# Patient Record
Sex: FEMALE | Race: WHITE | Employment: UNEMPLOYED | URBAN - METROPOLITAN AREA
[De-identification: names, ages, dates, MRNs, and addresses within clinical notes are randomized per-mention and may not be internally consistent; named-entity substitution may affect disease eponyms.]

---

## 2020-02-12 ENCOUNTER — OFFICE VISIT (OUTPATIENT)
Dept: FAMILY MEDICINE CLINIC | Age: 55
End: 2020-02-12
Payer: COMMERCIAL

## 2020-02-12 VITALS
DIASTOLIC BLOOD PRESSURE: 82 MMHG | SYSTOLIC BLOOD PRESSURE: 126 MMHG | TEMPERATURE: 98 F | BODY MASS INDEX: 29.06 KG/M2 | WEIGHT: 174.4 LBS | HEART RATE: 93 BPM | RESPIRATION RATE: 15 BRPM | OXYGEN SATURATION: 98 % | HEIGHT: 65 IN

## 2020-02-12 PROCEDURE — 99213 OFFICE O/P EST LOW 20 MIN: CPT | Performed by: NURSE PRACTITIONER

## 2020-02-12 RX ORDER — AMOXICILLIN AND CLAVULANATE POTASSIUM 875; 125 MG/1; MG/1
1 TABLET, FILM COATED ORAL 2 TIMES DAILY
Qty: 20 TABLET | Refills: 0 | Status: SHIPPED | OUTPATIENT
Start: 2020-02-12 | End: 2020-02-22

## 2020-02-12 ASSESSMENT — ENCOUNTER SYMPTOMS
EYE PAIN: 0
SORE THROAT: 1
COUGH: 0
COLOR CHANGE: 0
SHORTNESS OF BREATH: 0
RHINORRHEA: 1
EYE DISCHARGE: 0
TROUBLE SWALLOWING: 0
SINUS PRESSURE: 1
SINUS PAIN: 1
WHEEZING: 0

## 2020-02-12 NOTE — PATIENT INSTRUCTIONS
Patient Education        Stopping Smoking: Care Instructions  Your Care Instructions  Cigarette smokers crave the nicotine in cigarettes. Giving it up is much harder than simply changing a habit. Your body has to stop craving the nicotine. It is hard to quit, but you can do it. There are many tools that people use to quit smoking. You may find that combining tools works best for you. There are several steps to quitting. First you get ready to quit. Then you get support to help you. After that, you learn new skills and behaviors to become a nonsmoker. For many people, a necessary step is getting and using medicine. Your doctor will help you set up the plan that best meets your needs. You may want to attend a smoking cessation program to help you quit smoking. When you choose a program, look for one that has proven success. Ask your doctor for ideas. You will greatly increase your chances of success if you take medicine as well as get counseling or join a cessation program.  Some of the changes you feel when you first quit tobacco are uncomfortable. Your body will miss the nicotine at first, and you may feel short-tempered and grumpy. You may have trouble sleeping or concentrating. Medicine can help you deal with these symptoms. You may struggle with changing your smoking habits and rituals. The last step is the tricky one: Be prepared for the smoking urge to continue for a time. This is a lot to deal with, but keep at it. You will feel better. Follow-up care is a key part of your treatment and safety. Be sure to make and go to all appointments, and call your doctor if you are having problems. It's also a good idea to know your test results and keep a list of the medicines you take. How can you care for yourself at home? · Ask your family, friends, and coworkers for support. You have a better chance of quitting if you have help and support.   · Join a support group, such as Nicotine Anonymous, for people who are nicotine. · Be prepared to keep trying. Most people are not successful the first few times they try to quit. Do not get mad at yourself if you smoke again. Make a list of things you learned and think about when you want to try again, such as next week, next month, or next year. Where can you learn more? Go to https://chpesharee.PrestoBox. org and sign in to your [employer] account. Enter A166 in the [employer] box to learn more about \"Stopping Smoking: Care Instructions. \"     If you do not have an account, please click on the \"Sign Up Now\" link. Current as of: July 4, 2019  Content Version: 12.3  © 0427-7250 [employer]wise, [employer]. Care instructions adapted under license by [facility] [address] If you have questions about a medical condition or this instruction, always ask your healthcare professional. [place] [address] any warranty or liability for your use of this information. When should you call for help? Call your doctor now or seek immediate medical care if:    · You have new or worse swelling or redness in your face or around your eyes. · You have a new or higher fever. Watch closely for changes in your health, and be sure to contact your doctor if:    · You have new or worse facial pain. · The mucus from your nose becomes thicker (like pus) or has new blood in it. · You are not getting better as expected. ·   Do supportive care treatment at home for viral URI symptoms which include using cool mist humidifier and saline nasal flushes for nasal congestions. Drink plenty of water and use acetaminophen for fever as directed. How can you care for yourself at home? · Rest as much as possible until you feel better. · Be safe with medicines. Take your medicine exactly as prescribed. Call your doctor if you think you are having a problem with your medicine. You will get more details on the specific medicine your doctor prescribes.   · Take an over-the-counter pain medicine, such as acetaminophen (Tylenol), ibuprofen (Advil, Motrin), or naproxen (Aleve), as needed for pain and fever. Read and follow all instructions on the label. Do not give aspirin to anyone younger than 20. It has been linked to Reye syndrome, a serious illness. · Drink plenty of fluids, enough so that your urine is light yellow or clear like water. Hot fluids, such as tea or soup, may help relieve congestion in your nose and throat. If you have kidney, heart, or liver disease and have to limit fluids, talk with your doctor before you increase the amount of fluids you drink. · Try to clear mucus from your lungs by breathing deeply and coughing. · Gargle with warm salt water once an hour. This can help reduce swelling and throat pain. Use 1 teaspoon of salt mixed in 1 cup of warm water. · Do not smoke or allow others to smoke around you. If you need help quitting, talk to your doctor about stop-smoking programs and medicines. These can increase your chances of quitting for good.

## 2020-02-12 NOTE — PROGRESS NOTES
Lips: Pink. Mouth: Mucous membranes are moist.      Pharynx: No posterior oropharyngeal erythema. Neck:      Musculoskeletal: Neck supple. Cardiovascular:      Rate and Rhythm: Normal rate and regular rhythm. Pulses: Normal pulses. Heart sounds: Normal heart sounds. Pulmonary:      Effort: Pulmonary effort is normal.      Breath sounds: Normal breath sounds. No wheezing, rhonchi or rales. Lymphadenopathy:      Head:      Left side of head: Preauricular adenopathy present. Skin:     General: Skin is warm and dry. Capillary Refill: Capillary refill takes less than 2 seconds. Neurological:      Mental Status: She is alert and oriented to person, place, and time. Assessment:       Diagnosis Orders   1. Acute bacterial sinusitis  amoxicillin-clavulanate (AUGMENTIN) 875-125 MG per tablet      No results found for this visit on 02/12/20. Plan:   Do supportive care treatment at home for viral URI symptoms which include using cool mist humidifier and saline nasal flushes for nasal congestions. Drink plenty of water and use acetaminophen for fever as directed. How can you care for yourself at home? · Rest as much as possible until you feel better. · Be safe with medicines. Take your medicine exactly as prescribed. Call your doctor if you think you are having a problem with your medicine. You will get more details on the specific medicine your doctor prescribes. · Take an over-the-counter pain medicine, such as acetaminophen (Tylenol), ibuprofen (Advil, Motrin), or naproxen (Aleve), as needed for pain and fever. Read and follow all instructions on the label. Do not give aspirin to anyone younger than 20. It has been linked to Reye syndrome, a serious illness. · Drink plenty of fluids, enough so that your urine is light yellow or clear like water. Hot fluids, such as tea or soup, may help relieve congestion in your nose and throat.  If you have kidney, heart, or liver disease and have to limit fluids, talk with your doctor before you increase the amount of fluids you drink. · Try to clear mucus from your lungs by breathing deeply and coughing. · Gargle with warm salt water once an hour. This can help reduce swelling and throat pain. Use 1 teaspoon of salt mixed in 1 cup of warm water. · Do not smoke or allow others to smoke around you. If you need help quitting, talk to your doctor about stop-smoking programs and medicines. These can increase your chances of quitting for good. When should you call for help? Call your doctor now or seek immediate medical care if:    · You have new or worse swelling or redness in your face or around your eyes. · You have a new or higher fever. Watch closely for changes in your health, and be sure to contact your doctor if:    · You have new or worse facial pain. · The mucus from your nose becomes thicker (like pus) or has new blood in it. · You are not getting better as expected. ·   Add a daily zyrtec to allergy regimen. Discussed with patient/family worsening signs and symptoms as outlined above as to when to return for care/seek emergent help. Assessment & Plan   Jay Ivey was seen today for otalgia and headache. Diagnoses and all orders for this visit:    Acute bacterial sinusitis  -     amoxicillin-clavulanate (AUGMENTIN) 875-125 MG per tablet; Take 1 tablet by mouth 2 times daily for 10 days      No orders of the defined types were placed in this encounter. Orders Placed This Encounter   Medications    amoxicillin-clavulanate (AUGMENTIN) 875-125 MG per tablet     Sig: Take 1 tablet by mouth 2 times daily for 10 days     Dispense:  20 tablet     Refill:  0     There are no discontinued medications. Return for Follow up w/ PCP if symptoms worsen or go to ED. Reviewed with the patient/family: current clinical status & medications.   Side effects of the medication prescribed today, as well as treatment

## 2022-08-17 ENCOUNTER — HOSPITAL ENCOUNTER (EMERGENCY)
Age: 57
Discharge: HOME | End: 2022-08-17
Payer: COMMERCIAL

## 2022-08-17 ENCOUNTER — APPOINTMENT (OUTPATIENT)
Dept: CT IMAGING | Age: 57
End: 2022-08-17
Payer: COMMERCIAL

## 2022-08-17 ENCOUNTER — APPOINTMENT (OUTPATIENT)
Dept: GENERAL RADIOLOGY | Age: 57
End: 2022-08-17
Payer: COMMERCIAL

## 2022-08-17 VITALS
BODY MASS INDEX: 24.99 KG/M2 | TEMPERATURE: 98.4 F | HEIGHT: 65 IN | DIASTOLIC BLOOD PRESSURE: 92 MMHG | RESPIRATION RATE: 14 BRPM | WEIGHT: 150 LBS | HEART RATE: 96 BPM | OXYGEN SATURATION: 98 % | SYSTOLIC BLOOD PRESSURE: 124 MMHG

## 2022-08-17 DIAGNOSIS — R93.5 ABNORMAL CT SCAN, PELVIS: ICD-10-CM

## 2022-08-17 DIAGNOSIS — R10.84 GENERALIZED ABDOMINAL PAIN: Primary | ICD-10-CM

## 2022-08-17 LAB
ALBUMIN SERPL-MCNC: 4.4 G/DL (ref 3.5–4.6)
ALP BLD-CCNC: 116 U/L (ref 40–130)
ALT SERPL-CCNC: 10 U/L (ref 0–33)
ANION GAP SERPL CALCULATED.3IONS-SCNC: 10 MEQ/L (ref 9–15)
AST SERPL-CCNC: 10 U/L (ref 0–35)
BASOPHILS ABSOLUTE: 0.1 K/UL (ref 0–0.2)
BASOPHILS RELATIVE PERCENT: 0.7 %
BILIRUB SERPL-MCNC: <0.2 MG/DL (ref 0.2–0.7)
BILIRUBIN URINE: NEGATIVE
BLOOD, URINE: NEGATIVE
BUN BLDV-MCNC: 12 MG/DL (ref 6–20)
CALCIUM SERPL-MCNC: 9.6 MG/DL (ref 8.5–9.9)
CHLORIDE BLD-SCNC: 104 MEQ/L (ref 95–107)
CLARITY: CLEAR
CO2: 23 MEQ/L (ref 20–31)
COLOR: YELLOW
CREAT SERPL-MCNC: 0.67 MG/DL (ref 0.5–0.9)
EKG ATRIAL RATE: 94 BPM
EKG P AXIS: 45 DEGREES
EKG P-R INTERVAL: 152 MS
EKG Q-T INTERVAL: 344 MS
EKG QRS DURATION: 90 MS
EKG QTC CALCULATION (BAZETT): 430 MS
EKG R AXIS: 42 DEGREES
EKG T AXIS: 47 DEGREES
EKG VENTRICULAR RATE: 94 BPM
EOSINOPHILS ABSOLUTE: 0 K/UL (ref 0–0.7)
EOSINOPHILS RELATIVE PERCENT: 0.4 %
GFR AFRICAN AMERICAN: >60
GFR NON-AFRICAN AMERICAN: >60
GLOBULIN: 2.8 G/DL (ref 2.3–3.5)
GLUCOSE BLD-MCNC: 96 MG/DL (ref 70–99)
GLUCOSE URINE: NEGATIVE MG/DL
HCT VFR BLD CALC: 41.8 % (ref 37–47)
HEMOGLOBIN: 13.8 G/DL (ref 12–16)
KETONES, URINE: NEGATIVE MG/DL
LACTIC ACID: 0.9 MMOL/L (ref 0.5–2.2)
LEUKOCYTE ESTERASE, URINE: NEGATIVE
LIPASE: 22 U/L (ref 12–95)
LYMPHOCYTES ABSOLUTE: 1.9 K/UL (ref 1–4.8)
LYMPHOCYTES RELATIVE PERCENT: 18.4 %
MAGNESIUM: 2.1 MG/DL (ref 1.7–2.4)
MCH RBC QN AUTO: 28.1 PG (ref 27–31.3)
MCHC RBC AUTO-ENTMCNC: 33 % (ref 33–37)
MCV RBC AUTO: 85.3 FL (ref 82–100)
MONOCYTES ABSOLUTE: 0.7 K/UL (ref 0.2–0.8)
MONOCYTES RELATIVE PERCENT: 6.8 %
NEUTROPHILS ABSOLUTE: 7.4 K/UL (ref 1.4–6.5)
NEUTROPHILS RELATIVE PERCENT: 73.7 %
NITRITE, URINE: NEGATIVE
PDW BLD-RTO: 15.2 % (ref 11.5–14.5)
PH UA: 6 (ref 5–9)
PLATELET # BLD: 309 K/UL (ref 130–400)
POC CREATININE WHOLE BLOOD: 0.7
POTASSIUM SERPL-SCNC: 4.2 MEQ/L (ref 3.4–4.9)
PROTEIN UA: NEGATIVE MG/DL
RBC # BLD: 4.9 M/UL (ref 4.2–5.4)
SODIUM BLD-SCNC: 137 MEQ/L (ref 135–144)
SPECIFIC GRAVITY UA: 1.01 (ref 1–1.03)
TOTAL PROTEIN: 7.2 G/DL (ref 6.3–8)
TROPONIN: <0.01 NG/ML (ref 0–0.01)
URINE REFLEX TO CULTURE: NORMAL
UROBILINOGEN, URINE: 0.2 E.U./DL
WBC # BLD: 10 K/UL (ref 4.8–10.8)

## 2022-08-17 PROCEDURE — 83690 ASSAY OF LIPASE: CPT

## 2022-08-17 PROCEDURE — 85025 COMPLETE CBC W/AUTO DIFF WBC: CPT

## 2022-08-17 PROCEDURE — 99285 EMERGENCY DEPT VISIT HI MDM: CPT

## 2022-08-17 PROCEDURE — 93005 ELECTROCARDIOGRAM TRACING: CPT | Performed by: EMERGENCY MEDICINE

## 2022-08-17 PROCEDURE — 96361 HYDRATE IV INFUSION ADD-ON: CPT

## 2022-08-17 PROCEDURE — 6360000004 HC RX CONTRAST MEDICATION: Performed by: STUDENT IN AN ORGANIZED HEALTH CARE EDUCATION/TRAINING PROGRAM

## 2022-08-17 PROCEDURE — 80053 COMPREHEN METABOLIC PANEL: CPT

## 2022-08-17 PROCEDURE — 83605 ASSAY OF LACTIC ACID: CPT

## 2022-08-17 PROCEDURE — 81003 URINALYSIS AUTO W/O SCOPE: CPT

## 2022-08-17 PROCEDURE — 2580000003 HC RX 258: Performed by: STUDENT IN AN ORGANIZED HEALTH CARE EDUCATION/TRAINING PROGRAM

## 2022-08-17 PROCEDURE — 36415 COLL VENOUS BLD VENIPUNCTURE: CPT

## 2022-08-17 PROCEDURE — 84484 ASSAY OF TROPONIN QUANT: CPT

## 2022-08-17 PROCEDURE — 96374 THER/PROPH/DIAG INJ IV PUSH: CPT

## 2022-08-17 PROCEDURE — 71045 X-RAY EXAM CHEST 1 VIEW: CPT

## 2022-08-17 PROCEDURE — 83735 ASSAY OF MAGNESIUM: CPT

## 2022-08-17 PROCEDURE — 74177 CT ABD & PELVIS W/CONTRAST: CPT

## 2022-08-17 PROCEDURE — 6360000002 HC RX W HCPCS: Performed by: STUDENT IN AN ORGANIZED HEALTH CARE EDUCATION/TRAINING PROGRAM

## 2022-08-17 PROCEDURE — 96375 TX/PRO/DX INJ NEW DRUG ADDON: CPT

## 2022-08-17 RX ORDER — ONDANSETRON 2 MG/ML
4 INJECTION INTRAMUSCULAR; INTRAVENOUS ONCE
Status: COMPLETED | OUTPATIENT
Start: 2022-08-17 | End: 2022-08-17

## 2022-08-17 RX ORDER — MORPHINE SULFATE 4 MG/ML
4 INJECTION, SOLUTION INTRAMUSCULAR; INTRAVENOUS ONCE
Status: COMPLETED | OUTPATIENT
Start: 2022-08-17 | End: 2022-08-17

## 2022-08-17 RX ORDER — ONDANSETRON 4 MG/1
4 TABLET, ORALLY DISINTEGRATING ORAL 3 TIMES DAILY PRN
Qty: 15 TABLET | Refills: 0 | Status: SHIPPED | OUTPATIENT
Start: 2022-08-17 | End: 2022-08-22

## 2022-08-17 RX ORDER — OXYCODONE HYDROCHLORIDE AND ACETAMINOPHEN 5; 325 MG/1; MG/1
1 TABLET ORAL EVERY 6 HOURS PRN
Qty: 12 TABLET | Refills: 0 | Status: SHIPPED | OUTPATIENT
Start: 2022-08-17 | End: 2022-08-22

## 2022-08-17 RX ORDER — 0.9 % SODIUM CHLORIDE 0.9 %
1000 INTRAVENOUS SOLUTION INTRAVENOUS ONCE
Status: COMPLETED | OUTPATIENT
Start: 2022-08-17 | End: 2022-08-17

## 2022-08-17 RX ADMIN — ONDANSETRON 4 MG: 2 INJECTION INTRAMUSCULAR; INTRAVENOUS at 13:35

## 2022-08-17 RX ADMIN — MORPHINE SULFATE 4 MG: 4 INJECTION, SOLUTION INTRAMUSCULAR; INTRAVENOUS at 13:35

## 2022-08-17 RX ADMIN — HYDROMORPHONE HYDROCHLORIDE 1 MG: 1 INJECTION, SOLUTION INTRAMUSCULAR; INTRAVENOUS; SUBCUTANEOUS at 17:02

## 2022-08-17 RX ADMIN — SODIUM CHLORIDE 1000 ML: 9 INJECTION, SOLUTION INTRAVENOUS at 13:39

## 2022-08-17 RX ADMIN — IOPAMIDOL 50 ML: 612 INJECTION, SOLUTION INTRAVENOUS at 14:38

## 2022-08-17 ASSESSMENT — PAIN - FUNCTIONAL ASSESSMENT
PAIN_FUNCTIONAL_ASSESSMENT: 0-10
PAIN_FUNCTIONAL_ASSESSMENT: 0-10

## 2022-08-17 ASSESSMENT — PAIN SCALES - GENERAL
PAINLEVEL_OUTOF10: 8
PAINLEVEL_OUTOF10: 6
PAINLEVEL_OUTOF10: 2
PAINLEVEL_OUTOF10: 2

## 2022-08-17 ASSESSMENT — ENCOUNTER SYMPTOMS
BACK PAIN: 0
ABDOMINAL PAIN: 1
VOMITING: 0
PHOTOPHOBIA: 0
DIARRHEA: 0
CONSTIPATION: 1
SHORTNESS OF BREATH: 0
COUGH: 0
ABDOMINAL DISTENTION: 1
BLOOD IN STOOL: 0
WHEEZING: 0
ANAL BLEEDING: 0
NAUSEA: 1

## 2022-08-17 ASSESSMENT — PAIN DESCRIPTION - FREQUENCY: FREQUENCY: INTERMITTENT

## 2022-08-17 ASSESSMENT — PAIN DESCRIPTION - LOCATION
LOCATION: ABDOMEN

## 2022-08-17 ASSESSMENT — PAIN DESCRIPTION - PAIN TYPE
TYPE: ACUTE PAIN
TYPE: ACUTE PAIN

## 2022-08-17 ASSESSMENT — PAIN DESCRIPTION - ORIENTATION: ORIENTATION: RIGHT;LEFT;UPPER;LOWER

## 2022-08-17 ASSESSMENT — PAIN DESCRIPTION - DESCRIPTORS
DESCRIPTORS: CRAMPING
DESCRIPTORS: ACHING;SHARP;SPASM

## 2022-08-17 NOTE — ED NOTES
Pt. Resting on cot. Much more comfortable. Reports she is ready to depart. Qs urine voided. Monitors removed. ABC's stable. Comfort and prayers provided to patient & spouse. Pt. Ambulatory out of the ED with nurse into the care of spouse.   Pt.'s updated contact info given to admitting of cell phone number of [facility]  08/17/22 [id number]

## 2022-08-17 NOTE — ED TRIAGE NOTES
Pt to the ED via walk into triage with c/o left sided abdominal pain that is all over that has been on and off for about a month and is now having pain up into left shoulder pain that started yesterday. Pt states that she feels very bloated and full of gas with hypoactive bowel sounds on auscultation.      Pt has hx of diverticulitis with abscess in 2009 and 2017

## 2022-08-17 NOTE — ED NOTES
Pt. Medicated as ordered. Her & her spouse were updated on the current plan of care with questions answered as much as possible at this time. Pt. Intermittently tearful. Comfort, including backrub provided. Spouse & pt educated on the importance of f/u care with the surgeon. They were advised that the office should be calling them early AM; however if they do not, to call in the morning. Spouse provided discharge paper work, medication rx's reviewed with SE.  Recommendation for stool softener initiation was also discussed without question. ABC's stable. Requesting CT report copy if available. Pt. denied further needs- speaking to daughter on phone at this time.      Fernando Wasserman, RODY  08/17/22 [phone]

## 2022-08-17 NOTE — ED NOTES
Pt.care obtained at this time. Care coordinator at bedside. Pt. Tearful in husbands arms  Declined pain medication at this time. Stated, \"I just need some time with my  and to talk to my brother. \"  Pt's wishes honored. Attending PA advised.      Shanda Borrego RN  08/17/22 [phone]

## 2022-08-17 NOTE — DISCHARGE INSTRUCTIONS
Dr. Clara Herr office should be calling you tomorrow. If they do not, please call to make an appointment. Dr. Parveen Gilbert can see you on Friday, he is expecting you. Return to the ED for worsening symptoms.

## 2022-08-18 LAB
GFR AFRICAN AMERICAN: >60
GFR NON-AFRICAN AMERICAN: >60
PERFORMED ON: NORMAL
POC CREATININE: 0.7 MG/DL (ref 0.6–1.2)
POC SAMPLE TYPE: NORMAL

## 2022-08-18 NOTE — PROGRESS NOTES
Patient was in the emergency department with a colon mass. Please have her on the schedule for tomorrow 8/19/2022. She should be calling the office today regarding an appointment.

## 2022-08-19 ENCOUNTER — OFFICE VISIT (OUTPATIENT)
Dept: SURGERY | Age: 57
End: 2022-08-19
Payer: COMMERCIAL

## 2022-08-19 VITALS
HEIGHT: 65 IN | HEART RATE: 79 BPM | OXYGEN SATURATION: 98 % | WEIGHT: 150 LBS | TEMPERATURE: 97.9 F | BODY MASS INDEX: 24.99 KG/M2

## 2022-08-19 DIAGNOSIS — K56.699 DIVERTICULAR STRICTURE (HCC): Primary | ICD-10-CM

## 2022-08-19 PROCEDURE — 99204 OFFICE O/P NEW MOD 45 MIN: CPT | Performed by: COLON & RECTAL SURGERY

## 2022-08-19 ASSESSMENT — ENCOUNTER SYMPTOMS
COLOR CHANGE: 0
RECTAL PAIN: 0
NAUSEA: 1
ABDOMINAL PAIN: 0
CONSTIPATION: 1
SHORTNESS OF BREATH: 0
CHEST TIGHTNESS: 0
ABDOMINAL DISTENTION: 1
ANAL BLEEDING: 0
DIARRHEA: 0
VOMITING: 0
BLOOD IN STOOL: 0

## 2022-08-19 NOTE — PROGRESS NOTES
Subjective:      Patient ID: Irene Vega is a 61 y.o. female who presents for:  Chief Complaint   Patient presents with    Abdominal Pain       This is a 76-year-old female who was seen in the emergency room 48 hours ago regarding continued abdominal distention. She had a CAT scan which showed a strictured area near the rectosigmoid junction with a resultant large bowel obstruction. Interestingly, patient has had colonoscopies with her last one being about 6 years ago. She had no polyps identified. Patient has had a few episodes of diverticulitis which have been treated between this hospital in [facility].    She denies any rectal bleeding. I reviewed the CAT scan from the emergency department. I reviewed previous scans through this system which showed sigmoid diverticulitis. She had been asked about a resection a few years back at [facility] but she responded well to antibiotics. She had a previous umbilical hernia as a young child but no other abdominal surgery. No past medical history on file. No past surgical history on file.   Social History     Socioeconomic History    Marital status:      Spouse name: Not on file    Number of children: Not on file    Years of education: Not on file    Highest education level: Not on file   Occupational History    Not on file   Tobacco Use    Smoking status: Every Day     Types: Cigarettes    Smokeless tobacco: Current   Vaping Use    Vaping Use: Never used   Substance and Sexual Activity    Alcohol use: Not Currently    Drug use: Never    Sexual activity: Not Currently   Other Topics Concern    Not on file   Social History Narrative    Not on file     Social Determinants of Health     Financial Resource Strain: Not on file   Food Insecurity: Not on file   Transportation Needs: Not on file   Physical Activity: Not on file   Stress: Not on file   Social Connections: Not on file   Intimate Partner Violence: Not on file   Housing Stability: Not on file     No family history on file. Allergies:  Codeine    Review of Systems   Constitutional:  Positive for activity change and appetite change. Negative for fatigue and unexpected weight change. HENT:  Negative for congestion. Respiratory:  Negative for chest tightness and shortness of breath. Cardiovascular:  Negative for chest pain and palpitations. Gastrointestinal:  Positive for abdominal distention, constipation and nausea. Negative for abdominal pain, anal bleeding, blood in stool, diarrhea, rectal pain and vomiting. Genitourinary:  Negative for difficulty urinating and frequency. Musculoskeletal:  Negative for arthralgias. Skin:  Negative for color change. Neurological:  Negative for dizziness and headaches. Hematological:  Does not bruise/bleed easily. Psychiatric/Behavioral:  Negative for agitation. Objective:    Pulse 79   Temp 97.9 °F (36.6 °C) (Temporal)   Ht 5' 5\" (1.651 m)   Wt 150 lb (68 kg)   LMP  (LMP Unknown)   SpO2 98%   BMI 24.96 kg/m²     Physical Exam  Constitutional:       Appearance: She is well-developed. HENT:      Head: Normocephalic and atraumatic. Eyes:      Pupils: Pupils are equal, round, and reactive to light. Cardiovascular:      Rate and Rhythm: Normal rate and regular rhythm. Heart sounds: Normal heart sounds. Pulmonary:      Effort: Pulmonary effort is normal. No respiratory distress. Breath sounds: Normal breath sounds. No wheezing. Abdominal:      General: There is distension. Palpations: There is no mass. Tenderness: There is no abdominal tenderness. There is no guarding or rebound. Hernia: No hernia is present. Genitourinary:     Comments: Digital exam performed and unremarkable. Musculoskeletal:         General: Normal range of motion. Cervical back: Normal range of motion and neck supple. Skin:     General: Skin is warm and dry. Coloration: Skin is not pale.       Findings: No erythema or rash.   Neurological:      Mental Status: She is alert and oriented to person, place, and time. Psychiatric:         Behavior: Behavior normal.         Judgment: Judgment normal.            Assessment/Plan:          Diagnosis Orders   1. Diverticular stricture ([address]          I discussed these findings  With her clinical history. Given her previous work-up and treatment for diverticulitis of the sigmoid colon, along with the previous colonoscopies which were unremarkable, I believe this is related to a sigmoid stricture. It does appear high-grade. I discussed the risks and benefits of sigmoid resection with diverting ileostomy. I discussed all the potential complications of anastomotic leak as well as intraoperative problems such as damage to the surrounding structures and reoperation. I explained that this could potentially represent a cancer although it would be treated identically. Given her history, I do not believe this represents a malignancy it is possible however. I have discussed the potential for a colonoscopy although I believe this high-grade stenosis will not allow treatment and potentially aggravate the issue by increasing the size and girth of the colon to the point where this becomes more of an urgent operation. I discussed the benefits of a slow bowel prep to allow resection with primary anastomosis and diverting ileostomy. All questions were answered. All possibilities including malignancy discussed. She understands and wishes to proceed. She agrees to the prospect of a colonoscopy and the potential aggravation of an existing large bowel obstruction will be an unnecessary risk. She understands the need for up postoperative colonoscopy in 3 to 6-month following surgery. I explained to her all the options. She understands the risks as well as the need for a slow bowel prep.  was present during our discussion. She is well-informed. She wishes to proceed.     Ostomy nurse to ozzie her preoperatively. Total time spent with the patient including reviewing films and previous medical notes was 60 minutes. Please note this report has beenpartially produced using speech recognition software and may cause contain errors related to that system including grammar, punctuation and spelling as well as words and phrases that may seem inappropriate.  If there arequestions or concerns please feel free to contact me to clarify

## 2022-08-24 ENCOUNTER — TELEPHONE (OUTPATIENT)
Dept: SURGERY | Age: 57
End: 2022-08-24

## 2022-08-24 ENCOUNTER — HOSPITAL ENCOUNTER (INPATIENT)
Age: 57
LOS: 7 days | Discharge: HOME HEALTH | DRG: 329 | End: 2022-08-31
Attending: COLON & RECTAL SURGERY | Admitting: COLON & RECTAL SURGERY
Payer: COMMERCIAL

## 2022-08-24 DIAGNOSIS — G89.18 POSTOPERATIVE PAIN: Primary | ICD-10-CM

## 2022-08-24 DIAGNOSIS — K57.90 DIVERTICULAR DISEASE: ICD-10-CM

## 2022-08-24 PROBLEM — K56.609 INTESTINAL OBSTRUCTION (HCC): Status: ACTIVE | Noted: 2022-08-24

## 2022-08-24 LAB
ALBUMIN SERPL-MCNC: 4.3 G/DL (ref 3.5–4.6)
ALP BLD-CCNC: 114 U/L (ref 40–130)
ALT SERPL-CCNC: 9 U/L (ref 0–33)
ANION GAP SERPL CALCULATED.3IONS-SCNC: 12 MEQ/L (ref 9–15)
AST SERPL-CCNC: 8 U/L (ref 0–35)
BASOPHILS ABSOLUTE: 0 K/UL (ref 0–0.2)
BASOPHILS RELATIVE PERCENT: 0.5 %
BILIRUB SERPL-MCNC: 0.4 MG/DL (ref 0.2–0.7)
BUN BLDV-MCNC: 11 MG/DL (ref 6–20)
CALCIUM SERPL-MCNC: 9.5 MG/DL (ref 8.5–9.9)
CHLORIDE BLD-SCNC: 99 MEQ/L (ref 95–107)
CO2: 25 MEQ/L (ref 20–31)
CREAT SERPL-MCNC: 0.69 MG/DL (ref 0.5–0.9)
EOSINOPHILS ABSOLUTE: 0 K/UL (ref 0–0.7)
EOSINOPHILS RELATIVE PERCENT: 0.5 %
GFR AFRICAN AMERICAN: >60
GFR NON-AFRICAN AMERICAN: >60
GLOBULIN: 2.9 G/DL (ref 2.3–3.5)
GLUCOSE BLD-MCNC: 92 MG/DL (ref 70–99)
HCT VFR BLD CALC: 41.4 % (ref 37–47)
HEMOGLOBIN: 14 G/DL (ref 12–16)
LYMPHOCYTES ABSOLUTE: 2 K/UL (ref 1–4.8)
LYMPHOCYTES RELATIVE PERCENT: 26.1 %
MCH RBC QN AUTO: 28.8 PG (ref 27–31.3)
MCHC RBC AUTO-ENTMCNC: 33.8 % (ref 33–37)
MCV RBC AUTO: 85.4 FL (ref 82–100)
MONOCYTES ABSOLUTE: 0.8 K/UL (ref 0.2–0.8)
MONOCYTES RELATIVE PERCENT: 9.9 %
NEUTROPHILS ABSOLUTE: 4.9 K/UL (ref 1.4–6.5)
NEUTROPHILS RELATIVE PERCENT: 63 %
PDW BLD-RTO: 15.2 % (ref 11.5–14.5)
PLATELET # BLD: 312 K/UL (ref 130–400)
POTASSIUM REFLEX MAGNESIUM: 4.2 MEQ/L (ref 3.4–4.9)
RBC # BLD: 4.85 M/UL (ref 4.2–5.4)
SODIUM BLD-SCNC: 136 MEQ/L (ref 135–144)
TOTAL PROTEIN: 7.2 G/DL (ref 6.3–8)
WBC # BLD: 7.8 K/UL (ref 4.8–10.8)

## 2022-08-24 PROCEDURE — 99221 1ST HOSP IP/OBS SF/LOW 40: CPT | Performed by: COLON & RECTAL SURGERY

## 2022-08-24 PROCEDURE — 80053 COMPREHEN METABOLIC PANEL: CPT

## 2022-08-24 PROCEDURE — 6360000002 HC RX W HCPCS: Performed by: COLON & RECTAL SURGERY

## 2022-08-24 PROCEDURE — 1210000000 HC MED SURG R&B

## 2022-08-24 PROCEDURE — 2580000003 HC RX 258: Performed by: COLON & RECTAL SURGERY

## 2022-08-24 PROCEDURE — 36415 COLL VENOUS BLD VENIPUNCTURE: CPT

## 2022-08-24 PROCEDURE — 6360000002 HC RX W HCPCS: Performed by: INTERNAL MEDICINE

## 2022-08-24 PROCEDURE — 85025 COMPLETE CBC W/AUTO DIFF WBC: CPT

## 2022-08-24 RX ORDER — METOCLOPRAMIDE HYDROCHLORIDE 5 MG/ML
10 INJECTION INTRAMUSCULAR; INTRAVENOUS ONCE
Status: COMPLETED | OUTPATIENT
Start: 2022-08-24 | End: 2022-08-24

## 2022-08-24 RX ORDER — ONDANSETRON 4 MG/1
4 TABLET, ORALLY DISINTEGRATING ORAL EVERY 8 HOURS PRN
Status: DISCONTINUED | OUTPATIENT
Start: 2022-08-24 | End: 2022-08-29

## 2022-08-24 RX ORDER — ONDANSETRON 2 MG/ML
4 INJECTION INTRAMUSCULAR; INTRAVENOUS EVERY 6 HOURS PRN
Status: DISCONTINUED | OUTPATIENT
Start: 2022-08-24 | End: 2022-08-29

## 2022-08-24 RX ORDER — ENOXAPARIN SODIUM 100 MG/ML
40 INJECTION SUBCUTANEOUS DAILY
Status: DISCONTINUED | OUTPATIENT
Start: 2022-08-24 | End: 2022-08-25

## 2022-08-24 RX ORDER — SODIUM CHLORIDE 0.9 % (FLUSH) 0.9 %
5-40 SYRINGE (ML) INJECTION EVERY 12 HOURS SCHEDULED
Status: DISCONTINUED | OUTPATIENT
Start: 2022-08-24 | End: 2022-08-31 | Stop reason: HOSPADM

## 2022-08-24 RX ORDER — NICOTINE 21 MG/24HR
1 PATCH, TRANSDERMAL 24 HOURS TRANSDERMAL DAILY PRN
Status: DISCONTINUED | OUTPATIENT
Start: 2022-08-24 | End: 2022-08-31 | Stop reason: HOSPADM

## 2022-08-24 RX ORDER — SODIUM CHLORIDE 9 MG/ML
INJECTION, SOLUTION INTRAVENOUS CONTINUOUS
Status: DISCONTINUED | OUTPATIENT
Start: 2022-08-24 | End: 2022-08-25

## 2022-08-24 RX ORDER — M-VIT,TX,IRON,MINS/CALC/FOLIC 27MG-0.4MG
1 TABLET ORAL DAILY
COMMUNITY

## 2022-08-24 RX ORDER — SODIUM CHLORIDE 0.9 % (FLUSH) 0.9 %
5-40 SYRINGE (ML) INJECTION PRN
Status: DISCONTINUED | OUTPATIENT
Start: 2022-08-24 | End: 2022-08-31 | Stop reason: HOSPADM

## 2022-08-24 RX ORDER — KETOROLAC TROMETHAMINE 30 MG/ML
30 INJECTION, SOLUTION INTRAMUSCULAR; INTRAVENOUS EVERY 6 HOURS PRN
Status: DISPENSED | OUTPATIENT
Start: 2022-08-24 | End: 2022-08-27

## 2022-08-24 RX ORDER — SODIUM CHLORIDE 9 MG/ML
INJECTION, SOLUTION INTRAVENOUS PRN
Status: DISCONTINUED | OUTPATIENT
Start: 2022-08-24 | End: 2022-08-31 | Stop reason: HOSPADM

## 2022-08-24 RX ADMIN — ONDANSETRON 4 MG: 2 INJECTION INTRAMUSCULAR; INTRAVENOUS at 21:46

## 2022-08-24 RX ADMIN — ONDANSETRON 4 MG: 2 INJECTION INTRAMUSCULAR; INTRAVENOUS at 14:42

## 2022-08-24 RX ADMIN — SODIUM CHLORIDE: 9 INJECTION, SOLUTION INTRAVENOUS at 21:38

## 2022-08-24 RX ADMIN — SODIUM CHLORIDE: 9 INJECTION, SOLUTION INTRAVENOUS at 14:42

## 2022-08-24 RX ADMIN — HYDROMORPHONE HYDROCHLORIDE 1 MG: 1 INJECTION, SOLUTION INTRAMUSCULAR; INTRAVENOUS; SUBCUTANEOUS at 14:42

## 2022-08-24 RX ADMIN — ENOXAPARIN SODIUM 40 MG: 100 INJECTION SUBCUTANEOUS at 14:44

## 2022-08-24 RX ADMIN — METOCLOPRAMIDE 10 MG: 5 INJECTION, SOLUTION INTRAMUSCULAR; INTRAVENOUS at 16:30

## 2022-08-24 RX ADMIN — KETOROLAC TROMETHAMINE 30 MG: 30 INJECTION, SOLUTION INTRAMUSCULAR; INTRAVENOUS at 21:45

## 2022-08-24 ASSESSMENT — PAIN SCALES - GENERAL
PAINLEVEL_OUTOF10: 7
PAINLEVEL_OUTOF10: 10
PAINLEVEL_OUTOF10: 2
PAINLEVEL_OUTOF10: 10

## 2022-08-24 ASSESSMENT — PAIN - FUNCTIONAL ASSESSMENT: PAIN_FUNCTIONAL_ASSESSMENT: ACTIVITIES ARE NOT PREVENTED

## 2022-08-24 ASSESSMENT — PAIN DESCRIPTION - LOCATION: LOCATION: ABDOMEN

## 2022-08-24 NOTE — PLAN OF CARE
Problem: Discharge Planning  Goal: Discharge to home or other facility with appropriate resources  Outcome: Progressing  Flowsheets (Taken 8/24/2022 1512)  Discharge to home or other facility with appropriate resources: Identify discharge learning needs (meds, wound care, etc)     Problem: ABCDS Injury Assessment  Goal: Absence of physical injury  Outcome: Progressing     Problem: Pain  Goal: Verbalizes/displays adequate comfort level or baseline comfort level  Outcome: Progressing

## 2022-08-24 NOTE — H&P
Department of General Surgery - Adult  Surgical Service colorectal surgery  Attending admit note      CHIEF COMPLAINT: Abdominal pain    History Obtained From:  patient, electronic medical record    HISTORY OF PRESENT ILLNESS:    This is a 79-year-old female who was seen in the emergency room 48 hours ago regarding continued abdominal distention. She had a CAT scan which showed a strictured area near the rectosigmoid junction with a resultant large bowel obstruction. Interestingly, patient has had colonoscopies with her last one being about 6 years ago. She had no polyps identified. Patient has had a few episodes of diverticulitis which have been treated between this hospital in [facility].     She denies any rectal bleeding. I reviewed the CAT scan from the emergency department. I reviewed previous scans through this system which showed sigmoid diverticulitis. She had been asked about a resection a few years back at [facility] but she responded well to antibiotics. She had a previous umbilical hernia as a young child but no other abdominal surgery. She was scheduled to have surgery tomorrow but has had persistent issues with abdominal distention and pain. She was directly admitted for pain control and surgical treatment prior to surgery. All questions were answered regarding her upcoming surgery tomorrow. She wishes to proceed. Consent obtained. Past Medical History:    History reviewed. No pertinent past medical history. Past Surgical History:    History reviewed. No pertinent surgical history.   Current Medications:   Current Facility-Administered Medications: sodium chloride flush 0.9 % injection 5-40 mL, 5-40 mL, IntraVENous, 2 times per day  sodium chloride flush 0.9 % injection 5-40 mL, 5-40 mL, IntraVENous, PRN  0.9 % sodium chloride infusion, , IntraVENous, PRN  enoxaparin (LOVENOX) injection 40 mg, 40 mg, SubCUTAneous, Daily  ondansetron (ZOFRAN-ODT) lymphadenopathy  BACK:  Symmetric, no curvature, spinous processes are non-tender on palpation, paraspinous muscles are non-tender on palpation, no costal vertebral tenderness  LUNGS:  No increased work of breathing, good air exchange, clear to auscultation bilaterally, no crackles or wheezing  CARDIOVASCULAR:  Normal apical impulse, regular rate and rhythm, normal S1 and S2, no S3 or S4, and no murmur noted  ABDOMEN: Soft mildly distended no abdominal wall hernia  MUSCULOSKELETAL:  There is no redness, warmth, or swelling of the joints. Full range of motion noted. Motor strength is 5 out of 5 all extremities bilaterally. Tone is normal.  SKIN:  no bruising or bleeding  DATA:    CBC:   Lab Results   Component Value Date/Time    WBC 7.8 08/24/2022 02:51 PM    RBC 4.85 08/24/2022 02:51 PM    HGB 14.0 08/24/2022 02:51 PM    HCT 41.4 08/24/2022 02:51 PM    MCV 85.4 08/24/2022 02:51 PM    MCH 28.8 08/24/2022 02:51 PM    MCHC 33.8 08/24/2022 02:51 PM    RDW 15.2 08/24/2022 02:51 PM     08/24/2022 02:51 PM     CMP:    Lab Results   Component Value Date/Time     08/24/2022 02:51 PM    K 4.2 08/24/2022 02:51 PM    CL 99 08/24/2022 02:51 PM    CO2 25 08/24/2022 02:51 PM    BUN 11 08/24/2022 02:51 PM    CREATININE 0.69 08/24/2022 02:51 PM    GFRAA >60.0 08/24/2022 02:51 PM    LABGLOM >60.0 08/24/2022 02:51 PM    GLUCOSE 92 08/24/2022 02:51 PM    PROT 7.2 08/24/2022 02:51 PM    LABALBU 4.3 08/24/2022 02:51 PM    CALCIUM 9.5 08/24/2022 02:51 PM    BILITOT 0.4 08/24/2022 02:51 PM    ALKPHOS 114 08/24/2022 02:51 PM    AST 8 08/24/2022 02:51 PM    ALT 9 08/24/2022 02:51 PM       IMPRESSION/RECOMMENDATIONS:      Large bowel obstruction from suspected diverticular stricture. Carcinoma possibility as well. Patient to be started on IV fluids overnight. Stoma marking tomorrow    Plan for laparotomy and bowel resection with diverting ostomy tomorrow unchanged from previous plan. All questions were answered. Nasogastric tube attempted but unsuccessful. Abdomen currently soft and only mildly distended. We will continue current management    Hospitalist consulted for assistance with medical comorbidities.

## 2022-08-24 NOTE — TELEPHONE ENCOUNTER
Patient calling. She was seen Friday for abd pain and abnl CT. Scheduled for sigmoid colectomy tomorrow. States she has been up since 3 am this morning trying to complete the bowel prep. States she is unable to have a bowel movement. She is getting very nauseous from drinking the prep. Her entire abdomen is painful. She is requesting a phone call from Dr. Rona Williamson. I did notify the patient that doctor is in the OR today but I would send him the message. Pt cell # [phone].    Pt  cell # [phone]

## 2022-08-24 NOTE — CONSULTS
Physician Progress Note    8/24/2022   3:02 PM    Name:  Cyndie Perez  MRN:    [id number]      Day: 0     Admit Date: 8/24/2022  1:48 PM  PCP: Ermias Fox MD    Code Status:  Full Code    Subjective:     She has been feeling unwell for 2 months but over the last 4 weeks she has been having abdominal discomfort. She is found to have a mass in her abdomen during an ED visit on 8/17 and was brought in for surgery today. Over the last 2 days, she has been having nausea and vomiting. She did have a bowel movement this morning. She has never had a colonoscopy before. She has family history of lung cancer in her father and brother. She smokes 1.5 PPD since she was a teenager. She denies any history of known lung issues, heart issues, prior CVA. She does not take any medications for high blood pressure or diabetes. She denies alcohol or illicit drug use.     Current Facility-Administered Medications   Medication Dose Route Frequency Provider Last Rate Last Admin    sodium chloride flush 0.9 % injection 5-40 mL  5-40 mL IntraVENous 2 times per day Dorcas Obregon MD        sodium chloride flush 0.9 % injection 5-40 mL  5-40 mL IntraVENous PRN Dorcas Obregon MD        0.9 % sodium chloride infusion   IntraVENous PRN Dorcas Obregon MD        enoxaparin (LOVENOX) injection 40 mg  40 mg SubCUTAneous Daily Dorcas Obregon MD   40 mg at 08/24/22 1444    ondansetron (ZOFRAN-ODT) disintegrating tablet 4 mg  4 mg Oral Q8H PRN Dorcas Obregon MD        Or    ondansetron [facility]) injection 4 mg  4 mg IntraVENous Q6H PRN Dorcas Obregon MD   4 mg at 08/24/22 1442    0.9 % sodium chloride infusion   IntraVENous Continuous Dorcas Obregon  mL/hr at 08/24/22 1442 New Bag at 08/24/22 1442    HYDROmorphone (DILAUDID) injection 0.5 mg  0.5 mg IntraVENous Q3H PRN Dorcas Obregon MD        Or    HYDROmorphone (DILAUDID) injection 1 mg  1 mg IntraVENous Q3H PRCONNIE Obregon MD 1 mg at 22 1442    nicotine (NICODERM CQ) 21 MG/24HR 1 patch  1 patch TransDERmal Daily PRN Mekhi Sheriff DO           Physical Examination:      Vitals:  /76   Pulse 78   Temp 98.2 °F (36.8 °C) (Oral)   Resp 18   Ht 5' 5\" (1.651 m)   Wt 150 lb (68 kg)   LMP  (LMP Unknown)   SpO2 100%   BMI 24.96 kg/m²   Temp (24hrs), Av.2 °F (36.8 °C), Min:98.2 °F (36.8 °C), Max:98.2 °F (36.8 °C)      General appearance: alert, cooperative and no distress  Mental Status: oriented to person, place and time and normal affect  Lungs: clear to auscultation bilaterally, normal effort  Heart: regular rate and rhythm, no murmur  Abdomen: Diffuse mild tenderness to palpation. Abdomen is otherwise soft. No guarding or rebound tenderness  Extremities: no edema, redness, tenderness in the calves. Cap refill <2s  Skin: no gross lesions, rashes    Data:     Labs:  No results for input(s): WBC, HGB, PLT in the last 72 hours. No results for input(s): NA, K, CL, CO2, BUN, CREATININE, GLUCOSE in the last 72 hours. No results for input(s): AST, ALT, ALB, BILITOT, ALKPHOS in the last 72 hours. Assessment and Plan:        1. Obstructing colonic neoplasm in the distal sigmoid colon:  -Plan for surgery . We will follow surgical pathology.   Perioperative pain control, DVT prophylaxis, antibiotics, diet, activity, wound care per surgeon  -Basic blood work ordered    Tobacco use disorder  History of diverticulitis with abscess  Hiatal hernia     Diet: Diet NPO  Full Code    40 minutes in total care time    Electronically signed by Mekhi Sheriff DO on 2022 at 3:02 PM

## 2022-08-24 NOTE — PROGRESS NOTES
Spiritual Care Services     Summary of Visit:  Chasidy Montiel responded to spiritual care consult, pt is a a little anxious about her procedure tomorrow, pt shared that she wants to get better so she can continue to care for her grandson, pt has had her grandson since he was one day old, pt is hopeful, and open to prayer, prayed for the pt before leaving the room,     Spiritual Assessment/Intervention/Outcomes:                                  Values / Beliefs  Do You Have Any Ethnic, Cultural, Sacramental, or Spiritual Jewish Needs You Would Like Us To Be Aware of While You Are in the Hospital : No    Care Plan:    Ongoing support and prayers    Spiritual Care Services   Electronically signed by Johanna Donnelly on 8/24/22 at 7:33 PM EDT     To reach a  for emotional and spiritual support, place an [facility] consult request.   If a  is needed immediately, dial 0 and ask to page the on-call .

## 2022-08-25 ENCOUNTER — ANESTHESIA (OUTPATIENT)
Dept: OPERATING ROOM | Age: 57
DRG: 329 | End: 2022-08-25
Payer: COMMERCIAL

## 2022-08-25 ENCOUNTER — ANESTHESIA EVENT (OUTPATIENT)
Dept: OPERATING ROOM | Age: 57
DRG: 329 | End: 2022-08-25
Payer: COMMERCIAL

## 2022-08-25 PROBLEM — K57.90 DIVERTICULAR DISEASE: Status: ACTIVE | Noted: 2022-08-25

## 2022-08-25 PROBLEM — S37.10XA: Status: ACTIVE | Noted: 2022-08-25

## 2022-08-25 LAB
ABO/RH: NORMAL
ANION GAP SERPL CALCULATED.3IONS-SCNC: 10 MEQ/L (ref 9–15)
ANTIBODY SCREEN: NORMAL
BLOOD BANK DISPENSE STATUS: NORMAL
BLOOD BANK PRODUCT CODE: NORMAL
BPU ID: NORMAL
BUN BLDV-MCNC: 13 MG/DL (ref 6–20)
CALCIUM SERPL-MCNC: 9 MG/DL (ref 8.5–9.9)
CHLORIDE BLD-SCNC: 104 MEQ/L (ref 95–107)
CO2: 24 MEQ/L (ref 20–31)
CREAT SERPL-MCNC: 0.72 MG/DL (ref 0.5–0.9)
DESCRIPTION BLOOD BANK: NORMAL
GFR AFRICAN AMERICAN: >60
GFR NON-AFRICAN AMERICAN: >60
GLUCOSE BLD-MCNC: 98 MG/DL (ref 70–99)
HCT VFR BLD CALC: 40.4 % (ref 37–47)
HEMOGLOBIN: 13.6 G/DL (ref 12–16)
MCH RBC QN AUTO: 28.7 PG (ref 27–31.3)
MCHC RBC AUTO-ENTMCNC: 33.7 % (ref 33–37)
MCV RBC AUTO: 85.1 FL (ref 82–100)
PDW BLD-RTO: 14.9 % (ref 11.5–14.5)
PHOSPHORUS: 3.4 MG/DL (ref 2.3–4.8)
PLATELET # BLD: 302 K/UL (ref 130–400)
POTASSIUM REFLEX MAGNESIUM: 4.1 MEQ/L (ref 3.4–4.9)
RBC # BLD: 4.74 M/UL (ref 4.2–5.4)
SODIUM BLD-SCNC: 138 MEQ/L (ref 135–144)
WBC # BLD: 7.6 K/UL (ref 4.8–10.8)

## 2022-08-25 PROCEDURE — P9059 PLASMA, FRZ BETWEEN 8-24HOUR: HCPCS

## 2022-08-25 PROCEDURE — 2500000003 HC RX 250 WO HCPCS: Performed by: NURSE ANESTHETIST, CERTIFIED REGISTERED

## 2022-08-25 PROCEDURE — 6360000002 HC RX W HCPCS: Performed by: ANESTHESIOLOGY

## 2022-08-25 PROCEDURE — 3600000004 HC SURGERY LEVEL 4 BASE: Performed by: COLON & RECTAL SURGERY

## 2022-08-25 PROCEDURE — 2580000003 HC RX 258: Performed by: COLON & RECTAL SURGERY

## 2022-08-25 PROCEDURE — 0UT20ZZ RESECTION OF BILATERAL OVARIES, OPEN APPROACH: ICD-10-PCS | Performed by: COLON & RECTAL SURGERY

## 2022-08-25 PROCEDURE — 6360000002 HC RX W HCPCS: Performed by: NURSE PRACTITIONER

## 2022-08-25 PROCEDURE — 6370000000 HC RX 637 (ALT 250 FOR IP)

## 2022-08-25 PROCEDURE — 6360000002 HC RX W HCPCS: Performed by: COLON & RECTAL SURGERY

## 2022-08-25 PROCEDURE — 2580000003 HC RX 258: Performed by: NURSE ANESTHETIST, CERTIFIED REGISTERED

## 2022-08-25 PROCEDURE — C2625 STENT, NON-COR, TEM W/DEL SY: HCPCS | Performed by: COLON & RECTAL SURGERY

## 2022-08-25 PROCEDURE — C9113 INJ PANTOPRAZOLE SODIUM, VIA: HCPCS | Performed by: NURSE PRACTITIONER

## 2022-08-25 PROCEDURE — 0DTN0ZZ RESECTION OF SIGMOID COLON, OPEN APPROACH: ICD-10-PCS | Performed by: COLON & RECTAL SURGERY

## 2022-08-25 PROCEDURE — 52332 CYSTOSCOPY AND TREATMENT: CPT | Performed by: UROLOGY

## 2022-08-25 PROCEDURE — 2500000003 HC RX 250 WO HCPCS: Performed by: ANESTHESIOLOGY

## 2022-08-25 PROCEDURE — 2580000003 HC RX 258: Performed by: NURSE PRACTITIONER

## 2022-08-25 PROCEDURE — 7100000000 HC PACU RECOVERY - FIRST 15 MIN

## 2022-08-25 PROCEDURE — 86923 COMPATIBILITY TEST ELECTRIC: CPT

## 2022-08-25 PROCEDURE — 99213 OFFICE O/P EST LOW 20 MIN: CPT

## 2022-08-25 PROCEDURE — 3600000014 HC SURGERY LEVEL 4 ADDTL 15MIN: Performed by: COLON & RECTAL SURGERY

## 2022-08-25 PROCEDURE — 3700000000 HC ANESTHESIA ATTENDED CARE: Performed by: COLON & RECTAL SURGERY

## 2022-08-25 PROCEDURE — 3700000001 HC ADD 15 MINUTES (ANESTHESIA): Performed by: COLON & RECTAL SURGERY

## 2022-08-25 PROCEDURE — 0UT70ZZ RESECTION OF BILATERAL FALLOPIAN TUBES, OPEN APPROACH: ICD-10-PCS | Performed by: COLON & RECTAL SURGERY

## 2022-08-25 PROCEDURE — A4216 STERILE WATER/SALINE, 10 ML: HCPCS | Performed by: NURSE PRACTITIONER

## 2022-08-25 PROCEDURE — P9016 RBC LEUKOCYTES REDUCED: HCPCS

## 2022-08-25 PROCEDURE — 62325 NJX INTERLAMINAR CRV/THRC: CPT | Performed by: ANESTHESIOLOGY

## 2022-08-25 PROCEDURE — 0UT90ZZ RESECTION OF UTERUS, OPEN APPROACH: ICD-10-PCS | Performed by: COLON & RECTAL SURGERY

## 2022-08-25 PROCEDURE — 86901 BLOOD TYPING SEROLOGIC RH(D): CPT

## 2022-08-25 PROCEDURE — 2000000000 HC ICU R&B

## 2022-08-25 PROCEDURE — 80048 BASIC METABOLIC PNL TOTAL CA: CPT

## 2022-08-25 PROCEDURE — 44120 REMOVAL OF SMALL INTESTINE: CPT | Performed by: COLON & RECTAL SURGERY

## 2022-08-25 PROCEDURE — 88307 TISSUE EXAM BY PATHOLOGIST: CPT

## 2022-08-25 PROCEDURE — 84100 ASSAY OF PHOSPHORUS: CPT

## 2022-08-25 PROCEDURE — 36415 COLL VENOUS BLD VENIPUNCTURE: CPT

## 2022-08-25 PROCEDURE — 7100000001 HC PACU RECOVERY - ADDTL 15 MIN

## 2022-08-25 PROCEDURE — 2709999900 HC NON-CHARGEABLE SUPPLY: Performed by: COLON & RECTAL SURGERY

## 2022-08-25 PROCEDURE — 6360000002 HC RX W HCPCS: Performed by: NURSE ANESTHETIST, CERTIFIED REGISTERED

## 2022-08-25 PROCEDURE — 86850 RBC ANTIBODY SCREEN: CPT

## 2022-08-25 PROCEDURE — 0D1N0Z4 BYPASS SIGMOID COLON TO CUTANEOUS, OPEN APPROACH: ICD-10-PCS | Performed by: COLON & RECTAL SURGERY

## 2022-08-25 PROCEDURE — 58150 TOTAL HYSTERECTOMY: CPT | Performed by: OBSTETRICS & GYNECOLOGY

## 2022-08-25 PROCEDURE — 86900 BLOOD TYPING SEROLOGIC ABO: CPT

## 2022-08-25 PROCEDURE — 2720000010 HC SURG SUPPLY STERILE: Performed by: COLON & RECTAL SURGERY

## 2022-08-25 PROCEDURE — 0T778DZ DILATION OF LEFT URETER WITH INTRALUMINAL DEVICE, VIA NATURAL OR ARTIFICIAL OPENING ENDOSCOPIC: ICD-10-PCS | Performed by: COLON & RECTAL SURGERY

## 2022-08-25 PROCEDURE — 44141 PARTIAL REMOVAL OF COLON: CPT | Performed by: COLON & RECTAL SURGERY

## 2022-08-25 PROCEDURE — 2580000003 HC RX 258: Performed by: ANESTHESIOLOGY

## 2022-08-25 PROCEDURE — 85027 COMPLETE CBC AUTOMATED: CPT

## 2022-08-25 DEVICE — STENT URET 6FR L26CM POLYMER BLEND PH FREE COAT GRADUAL TAPR: Type: IMPLANTABLE DEVICE | Site: URETER | Status: FUNCTIONAL

## 2022-08-25 RX ORDER — FENTANYL CITRATE 50 UG/ML
50 INJECTION, SOLUTION INTRAMUSCULAR; INTRAVENOUS EVERY 10 MIN PRN
Status: DISCONTINUED | OUTPATIENT
Start: 2022-08-25 | End: 2022-08-28

## 2022-08-25 RX ORDER — SODIUM CHLORIDE, SODIUM LACTATE, POTASSIUM CHLORIDE, CALCIUM CHLORIDE 600; 310; 30; 20 MG/100ML; MG/100ML; MG/100ML; MG/100ML
INJECTION, SOLUTION INTRAVENOUS CONTINUOUS PRN
Status: DISCONTINUED | OUTPATIENT
Start: 2022-08-25 | End: 2022-08-25 | Stop reason: SDUPTHER

## 2022-08-25 RX ORDER — LIDOCAINE HYDROCHLORIDE 20 MG/ML
INJECTION, SOLUTION INTRAVENOUS PRN
Status: DISCONTINUED | OUTPATIENT
Start: 2022-08-25 | End: 2022-08-25 | Stop reason: SDUPTHER

## 2022-08-25 RX ORDER — SODIUM CHLORIDE 0.9 % (FLUSH) 0.9 %
5-40 SYRINGE (ML) INJECTION EVERY 12 HOURS SCHEDULED
Status: DISCONTINUED | OUTPATIENT
Start: 2022-08-25 | End: 2022-08-31 | Stop reason: HOSPADM

## 2022-08-25 RX ORDER — ONDANSETRON 2 MG/ML
4 INJECTION INTRAMUSCULAR; INTRAVENOUS EVERY 6 HOURS PRN
Status: DISCONTINUED | OUTPATIENT
Start: 2022-08-25 | End: 2022-08-25 | Stop reason: SDUPTHER

## 2022-08-25 RX ORDER — FENTANYL CITRATE 50 UG/ML
INJECTION, SOLUTION INTRAMUSCULAR; INTRAVENOUS PRN
Status: DISCONTINUED | OUTPATIENT
Start: 2022-08-25 | End: 2022-08-25 | Stop reason: SDUPTHER

## 2022-08-25 RX ORDER — METRONIDAZOLE 500 MG/100ML
500 INJECTION, SOLUTION INTRAVENOUS
Status: DISCONTINUED | OUTPATIENT
Start: 2022-08-25 | End: 2022-08-25 | Stop reason: HOSPADM

## 2022-08-25 RX ORDER — SODIUM CHLORIDE 9 MG/ML
INJECTION, SOLUTION INTRAVENOUS PRN
Status: DISCONTINUED | OUTPATIENT
Start: 2022-08-25 | End: 2022-08-31 | Stop reason: HOSPADM

## 2022-08-25 RX ORDER — ROCURONIUM BROMIDE 10 MG/ML
INJECTION, SOLUTION INTRAVENOUS PRN
Status: DISCONTINUED | OUTPATIENT
Start: 2022-08-25 | End: 2022-08-25 | Stop reason: SDUPTHER

## 2022-08-25 RX ORDER — ONDANSETRON 4 MG/1
4 TABLET, ORALLY DISINTEGRATING ORAL EVERY 8 HOURS PRN
Status: DISCONTINUED | OUTPATIENT
Start: 2022-08-25 | End: 2022-08-31 | Stop reason: HOSPADM

## 2022-08-25 RX ORDER — OXYCODONE HYDROCHLORIDE 5 MG/1
10 TABLET ORAL PRN
Status: ACTIVE | OUTPATIENT
Start: 2022-08-25 | End: 2022-08-25

## 2022-08-25 RX ORDER — LIDOCAINE HYDROCHLORIDE 10 MG/ML
INJECTION, SOLUTION EPIDURAL; INFILTRATION; INTRACAUDAL; PERINEURAL PRN
Status: DISCONTINUED | OUTPATIENT
Start: 2022-08-25 | End: 2022-08-25 | Stop reason: SDUPTHER

## 2022-08-25 RX ORDER — EPHEDRINE SULFATE/0.9% NACL/PF 50 MG/5 ML
SYRINGE (ML) INTRAVENOUS PRN
Status: DISCONTINUED | OUTPATIENT
Start: 2022-08-25 | End: 2022-08-25 | Stop reason: SDUPTHER

## 2022-08-25 RX ORDER — METOCLOPRAMIDE HYDROCHLORIDE 5 MG/ML
10 INJECTION INTRAMUSCULAR; INTRAVENOUS
Status: ACTIVE | OUTPATIENT
Start: 2022-08-25 | End: 2022-08-25

## 2022-08-25 RX ORDER — SODIUM CHLORIDE 9 MG/ML
INJECTION, SOLUTION INTRAVENOUS CONTINUOUS
Status: DISCONTINUED | OUTPATIENT
Start: 2022-08-25 | End: 2022-08-27

## 2022-08-25 RX ORDER — ONDANSETRON 2 MG/ML
4 INJECTION INTRAMUSCULAR; INTRAVENOUS
Status: ACTIVE | OUTPATIENT
Start: 2022-08-25 | End: 2022-08-25

## 2022-08-25 RX ORDER — ONDANSETRON 2 MG/ML
4 INJECTION INTRAMUSCULAR; INTRAVENOUS EVERY 6 HOURS PRN
Status: DISCONTINUED | OUTPATIENT
Start: 2022-08-25 | End: 2022-08-31 | Stop reason: HOSPADM

## 2022-08-25 RX ORDER — MIDAZOLAM HYDROCHLORIDE 1 MG/ML
INJECTION INTRAMUSCULAR; INTRAVENOUS PRN
Status: DISCONTINUED | OUTPATIENT
Start: 2022-08-25 | End: 2022-08-25 | Stop reason: SDUPTHER

## 2022-08-25 RX ORDER — SODIUM CHLORIDE 0.9 % (FLUSH) 0.9 %
5-40 SYRINGE (ML) INJECTION PRN
Status: DISCONTINUED | OUTPATIENT
Start: 2022-08-25 | End: 2022-08-31 | Stop reason: HOSPADM

## 2022-08-25 RX ORDER — PROPOFOL 10 MG/ML
INJECTION, EMULSION INTRAVENOUS PRN
Status: DISCONTINUED | OUTPATIENT
Start: 2022-08-25 | End: 2022-08-25 | Stop reason: SDUPTHER

## 2022-08-25 RX ORDER — DIPHENHYDRAMINE HYDROCHLORIDE 50 MG/ML
12.5 INJECTION INTRAMUSCULAR; INTRAVENOUS
Status: ACTIVE | OUTPATIENT
Start: 2022-08-25 | End: 2022-08-25

## 2022-08-25 RX ORDER — ONDANSETRON 2 MG/ML
INJECTION INTRAMUSCULAR; INTRAVENOUS PRN
Status: DISCONTINUED | OUTPATIENT
Start: 2022-08-25 | End: 2022-08-25 | Stop reason: SDUPTHER

## 2022-08-25 RX ORDER — DEXAMETHASONE SODIUM PHOSPHATE 4 MG/ML
INJECTION, SOLUTION INTRA-ARTICULAR; INTRALESIONAL; INTRAMUSCULAR; INTRAVENOUS; SOFT TISSUE PRN
Status: DISCONTINUED | OUTPATIENT
Start: 2022-08-25 | End: 2022-08-25 | Stop reason: SDUPTHER

## 2022-08-25 RX ORDER — OXYCODONE HYDROCHLORIDE 5 MG/1
5 TABLET ORAL PRN
Status: ACTIVE | OUTPATIENT
Start: 2022-08-25 | End: 2022-08-25

## 2022-08-25 RX ORDER — MEPERIDINE HYDROCHLORIDE 25 MG/ML
12.5 INJECTION INTRAMUSCULAR; INTRAVENOUS; SUBCUTANEOUS
Status: DISCONTINUED | OUTPATIENT
Start: 2022-08-25 | End: 2022-08-25 | Stop reason: HOSPADM

## 2022-08-25 RX ORDER — METRONIDAZOLE 500 MG/100ML
INJECTION, SOLUTION INTRAVENOUS PRN
Status: DISCONTINUED | OUTPATIENT
Start: 2022-08-25 | End: 2022-08-25 | Stop reason: SDUPTHER

## 2022-08-25 RX ORDER — MAGNESIUM HYDROXIDE 1200 MG/15ML
LIQUID ORAL CONTINUOUS PRN
Status: COMPLETED | OUTPATIENT
Start: 2022-08-25 | End: 2022-08-25

## 2022-08-25 RX ORDER — NALOXONE HYDROCHLORIDE 0.4 MG/ML
INJECTION, SOLUTION INTRAMUSCULAR; INTRAVENOUS; SUBCUTANEOUS PRN
Status: DISCONTINUED | OUTPATIENT
Start: 2022-08-25 | End: 2022-08-31 | Stop reason: HOSPADM

## 2022-08-25 RX ORDER — SODIUM CHLORIDE 9 MG/ML
25 INJECTION, SOLUTION INTRAVENOUS PRN
Status: DISCONTINUED | OUTPATIENT
Start: 2022-08-25 | End: 2022-08-31 | Stop reason: HOSPADM

## 2022-08-25 RX ADMIN — METRONIDAZOLE 500 MG: 500 INJECTION, SOLUTION INTRAVENOUS at 14:47

## 2022-08-25 RX ADMIN — FENTANYL CITRATE 50 MCG: 50 INJECTION, SOLUTION INTRAMUSCULAR; INTRAVENOUS at 14:31

## 2022-08-25 RX ADMIN — PROPOFOL 150 MG: 10 INJECTION, EMULSION INTRAVENOUS at 14:31

## 2022-08-25 RX ADMIN — SODIUM CHLORIDE, POTASSIUM CHLORIDE, SODIUM LACTATE AND CALCIUM CHLORIDE: 600; 310; 30; 20 INJECTION, SOLUTION INTRAVENOUS at 18:56

## 2022-08-25 RX ADMIN — SUGAMMADEX 200 MG: 100 INJECTION, SOLUTION INTRAVENOUS at 18:54

## 2022-08-25 RX ADMIN — DEXAMETHASONE SODIUM PHOSPHATE 4 MG: 4 INJECTION, SOLUTION INTRAMUSCULAR; INTRAVENOUS at 14:38

## 2022-08-25 RX ADMIN — SODIUM CHLORIDE, SODIUM LACTATE, POTASSIUM CHLORIDE, CALCIUM CHLORIDE: 600; 310; 30; 20 INJECTION, SOLUTION INTRAVENOUS at 18:44

## 2022-08-25 RX ADMIN — MIDAZOLAM HYDROCHLORIDE 2 MG: 1 INJECTION, SOLUTION INTRAMUSCULAR; INTRAVENOUS at 17:42

## 2022-08-25 RX ADMIN — SODIUM CHLORIDE, POTASSIUM CHLORIDE, SODIUM LACTATE AND CALCIUM CHLORIDE: 600; 310; 30; 20 INJECTION, SOLUTION INTRAVENOUS at 16:14

## 2022-08-25 RX ADMIN — PIPERACILLIN AND TAZOBACTAM 4500 MG: 4; .5 INJECTION, POWDER, LYOPHILIZED, FOR SOLUTION INTRAVENOUS at 20:29

## 2022-08-25 RX ADMIN — PROPOFOL 50 MG: 10 INJECTION, EMULSION INTRAVENOUS at 14:37

## 2022-08-25 RX ADMIN — PHENYLEPHRINE HYDROCHLORIDE 25 MCG/MIN: 10 INJECTION INTRAVENOUS at 16:03

## 2022-08-25 RX ADMIN — LIDOCAINE HYDROCHLORIDE 100 MG: 20 INJECTION, SOLUTION INTRAVENOUS at 14:53

## 2022-08-25 RX ADMIN — KETOROLAC TROMETHAMINE 30 MG: 30 INJECTION, SOLUTION INTRAMUSCULAR; INTRAVENOUS at 05:07

## 2022-08-25 RX ADMIN — KETOROLAC TROMETHAMINE 30 MG: 30 INJECTION, SOLUTION INTRAMUSCULAR; INTRAVENOUS at 11:46

## 2022-08-25 RX ADMIN — ROPIVACAINE HYDROCHLORIDE: 5 INJECTION EPIDURAL; INFILTRATION; PERINEURAL at 15:03

## 2022-08-25 RX ADMIN — SODIUM CHLORIDE, POTASSIUM CHLORIDE, SODIUM LACTATE AND CALCIUM CHLORIDE: 600; 310; 30; 20 INJECTION, SOLUTION INTRAVENOUS at 15:46

## 2022-08-25 RX ADMIN — ROCURONIUM BROMIDE 50 MG: 10 INJECTION, SOLUTION INTRAVENOUS at 17:17

## 2022-08-25 RX ADMIN — PHENYLEPHRINE HYDROCHLORIDE 100 MCG: 10 INJECTION INTRAVENOUS at 15:35

## 2022-08-25 RX ADMIN — PHENYLEPHRINE HYDROCHLORIDE 100 MCG: 10 INJECTION INTRAVENOUS at 15:39

## 2022-08-25 RX ADMIN — SODIUM CHLORIDE, PRESERVATIVE FREE 40 MG: 5 INJECTION INTRAVENOUS at 07:58

## 2022-08-25 RX ADMIN — ONDANSETRON 4 MG: 2 INJECTION INTRAMUSCULAR; INTRAVENOUS at 14:38

## 2022-08-25 RX ADMIN — SODIUM CHLORIDE: 9 INJECTION, SOLUTION INTRAVENOUS at 06:02

## 2022-08-25 RX ADMIN — PHENYLEPHRINE HYDROCHLORIDE 100 MCG: 10 INJECTION INTRAVENOUS at 15:23

## 2022-08-25 RX ADMIN — PHENYLEPHRINE HYDROCHLORIDE 100 MCG: 10 INJECTION INTRAVENOUS at 15:16

## 2022-08-25 RX ADMIN — FENTANYL CITRATE 50 MCG: 50 INJECTION, SOLUTION INTRAMUSCULAR; INTRAVENOUS at 14:38

## 2022-08-25 RX ADMIN — SODIUM CHLORIDE, SODIUM LACTATE, POTASSIUM CHLORIDE, CALCIUM CHLORIDE: 600; 310; 30; 20 INJECTION, SOLUTION INTRAVENOUS at 15:27

## 2022-08-25 RX ADMIN — ROCURONIUM BROMIDE 50 MG: 10 INJECTION, SOLUTION INTRAVENOUS at 14:31

## 2022-08-25 RX ADMIN — PHENYLEPHRINE HYDROCHLORIDE 100 MCG: 10 INJECTION INTRAVENOUS at 15:06

## 2022-08-25 RX ADMIN — LIDOCAINE HYDROCHLORIDE 50 MG: 10 INJECTION, SOLUTION EPIDURAL; INFILTRATION; INTRACAUDAL; PERINEURAL at 14:31

## 2022-08-25 RX ADMIN — CEFAZOLIN 2 MG: 10 INJECTION, POWDER, FOR SOLUTION INTRAVENOUS at 14:40

## 2022-08-25 RX ADMIN — ONDANSETRON 4 MG: 2 INJECTION INTRAMUSCULAR; INTRAVENOUS at 11:20

## 2022-08-25 RX ADMIN — ONDANSETRON 4 MG: 2 INJECTION INTRAMUSCULAR; INTRAVENOUS at 05:07

## 2022-08-25 RX ADMIN — Medication 10 MG: at 15:26

## 2022-08-25 RX ADMIN — SODIUM CHLORIDE, SODIUM LACTATE, POTASSIUM CHLORIDE, CALCIUM CHLORIDE: 600; 310; 30; 20 INJECTION, SOLUTION INTRAVENOUS at 16:29

## 2022-08-25 RX ADMIN — PHENYLEPHRINE HYDROCHLORIDE 100 MCG: 10 INJECTION INTRAVENOUS at 15:11

## 2022-08-25 RX ADMIN — SODIUM CHLORIDE, POTASSIUM CHLORIDE, SODIUM LACTATE AND CALCIUM CHLORIDE: 600; 310; 30; 20 INJECTION, SOLUTION INTRAVENOUS at 15:02

## 2022-08-25 RX ADMIN — SODIUM CHLORIDE: 9 INJECTION, SOLUTION INTRAVENOUS at 19:58

## 2022-08-25 RX ADMIN — SODIUM CHLORIDE, POTASSIUM CHLORIDE, SODIUM LACTATE AND CALCIUM CHLORIDE: 600; 310; 30; 20 INJECTION, SOLUTION INTRAVENOUS at 14:26

## 2022-08-25 ASSESSMENT — PAIN SCALES - GENERAL
PAINLEVEL_OUTOF10: 3
PAINLEVEL_OUTOF10: 6
PAINLEVEL_OUTOF10: 0

## 2022-08-25 ASSESSMENT — LIFESTYLE VARIABLES: SMOKING_STATUS: 1

## 2022-08-25 NOTE — ANESTHESIA POSTPROCEDURE EVALUATION
Department of Anesthesiology  Postprocedure Note    Patient: Giancarlo Lim  MRN: [id number]  YOB: 1965  Date of evaluation: 8/25/2022      Procedure Summary     Date: 08/25/22 Room / Location: [address]    Anesthesia Start: 1426 Anesthesia Stop: 1917    Procedure: SIGMOIDECTOMY WITH ABDOMINAL HYSTERECTOMY WITH BSO, END COLOSTOMY, ENTERECTOMY, CYSTOSCOPY WITH URETERAL STENT PLACEMENT (Abdomen) Diagnosis:       Diverticular disease      (DIVERTICULITIS STRICTURE)    Surgeons: Donald Abdalla MD Responsible Provider: Micky Lozano MD    Anesthesia Type: general, epidural ASA Status: 2          Anesthesia Type: No value filed.     Minal Phase I: Minal Score: 10    Minal Phase II:        Anesthesia Post Evaluation    Patient location during evaluation: ICU  Patient participation: complete - patient participated  Level of consciousness: awake and alert  Pain score: 0  Airway patency: patent  Nausea & Vomiting: no vomiting and no nausea  Complications: no  Cardiovascular status: hemodynamically stable  Respiratory status: face mask  Hydration status: stable  Multimodal analgesia pain management approach

## 2022-08-25 NOTE — BRIEF OP NOTE
Brief Postoperative Note      Patient: Bon Aguila  YOB: 1965  MRN: [id number]    Date of Procedure: 8/25/2022    Pre-Op Diagnosis: High-grade large bowel obstruction    Post-Op Diagnosis:  Rectal cancer with T4 involvement of uterus, small bowel, and left ureter       Procedure(s):  SIGMOIDECTOMY WITH ABDOMINAL HYSTERECTOMY WITH BSO, END COLOSTOMY, ENTERECTOMY, CYSTOSCOPY WITH URETERAL STENT PLACEMENT    Surgeon(s):  MD Mark Collazo MD Reford Ren, MD    Assistant:  First Assistant: Peewee Castillo    Anesthesia: General    Estimated Blood Loss (mL): 1035    Complications: None    Specimens:   ID Type Source Tests Collected by Time Destination   A : SIGMOID/RECTUM WITH TAHBSO Tissue Sigmoid Colon SURGICAL PATHOLOGY May Elmore MD 8/25/2022 1711    B : SMALL BOWEL Tissue Ileum SURGICAL PATHOLOGY May Elmore MD 8/25/2022 1735        Implants:  Implant Name Type Inv.  Item Serial No.  Lot No. LRB No. Used Action   STENT URET 6FR L26CM POLYMER BLEND PH FREE COAT GRADUAL TAPR - [id number]  STENT URET 6FR L26CM POLYMER BLEND [place] [address] FREE COAT GRADUAL TAPR [id number] [employer]WD [id number] Left 1 Implanted         Drains:   Colostomy LUQ (Active)       Findings: High-grade large bowel obstruction from rectal cancer involved with left ureter, uterus, and distal ileum    Electronically signed by Janis Peoples MD on 8/25/2022 at 7:11 PM

## 2022-08-25 NOTE — CARE COORDINATION
[facility] [place] Case Management Initial Discharge Assessment    Met with Patient to discuss discharge plan. PCP: Giuseppe Rojas MD Date of Last Visit: \"Appointment coming up 9/7/22\"    [facility] Patient: No        [facility] Notified: no    If no PCP, list provided? N/A    Discharge Planning    Living Arrangements: independently at home    Who do you live with?     Who helps you with your care:  self or spouse    If lives at home:     Do you have any barriers navigating in your home? yes - multi level house. 5 stairs, a landing, then 5 more stairs to go to upper level. Patient denies these are a barrier. Patient can perform ADL? Yes    Current Services (outpatient and in home) :  None    Dialysis: No    Is transportation available to get to your appointments? Yes    DME Equipment:  no    Respiratory equipment: None    Respiratory provider:  no     Pharmacy:  yes - [facility] [address] with Medication Assistance Program?  No      Patient agreeable to [place] [id number]? Yes, [employer] or [facility]    Patient agreeable to SNF/Rehab? Declined    Other discharge needs identified? N/A    Initial Discharge Plan? (Note: please see concurrent daily documentation for any updates after initial note). INITIAL D/C PLAN IS TO GO HOME WITH  AND HHC. PATIENT GIVEN LIST OF HHC AGENCIES. PATIENT FEELS THAT SHE HAS ENOUGH SUPPORT AT HOME WITH HER  TO HELP HER IF NEEDED.     Readmission Risk              Risk of Unplanned Readmission:  6         Electronically signed by Brayden Ledbetter RN on 8/25/2022 at 2:11 PM  [address]

## 2022-08-25 NOTE — ANESTHESIA PROCEDURE NOTES
Epidural Block    Patient location during procedure: pre-op  Start time: 8/25/2022 1:22 PM  End time: 8/25/2022 1:30 PM  Reason for block: post-op pain management  Staffing  Performed: anesthesiologist   Anesthesiologist: Kelle Ruiz MD  Epidural  Patient position: sitting  Prep: Betadine  Patient monitoring: cardiac monitor, continuous pulse ox and frequent blood pressure checks  Approach: midline  Location: T10-11  Injection technique: YUE saline  Provider prep: mask and sterile gloves  Needle  Needle type: Tuohy   Needle gauge: 17 G  Needle length: 3.5 in  Needle insertion depth: 4 cm  Catheter type: end hole  Catheter size: 18 G  Catheter at skin depth: 10 cm  Test dose: negative  Assessment  Sensory level: T6  Hemodynamics: stable  Attempts: 1  Preanesthetic Checklist  Completed: patient identified, IV checked, site marked, risks and benefits discussed, surgical/procedural consents, equipment checked, pre-op evaluation, timeout performed, anesthesia consent given, oxygen available and monitors applied/VS acknowledged

## 2022-08-25 NOTE — ANESTHESIA PRE PROCEDURE
125 mL/hr at 08/25/22 0602 New Bag at 08/25/22 0602    HYDROmorphone (DILAUDID) injection 0.5 mg  0.5 mg IntraVENous Q3H PRN Jose Bowers MD        Or    HYDROmorphone (DILAUDID) injection 1 mg  1 mg IntraVENous Q3H PRN Jose Bowers MD   1 mg at 08/24/22 1442    nicotine (NICODERM CQ) 21 MG/24HR 1 patch  1 patch TransDERmal Daily PRN Yanet Garay DO        ketorolac (TORADOL) injection 30 mg  30 mg IntraVENous Q6H PRN Jose Bowers MD   30 mg at 08/25/22 1146       Allergies: Allergies   Allergen Reactions    Codeine Nausea And Vomiting    Demerol Hcl [Meperidine] Nausea Only    Dilaudid [Hydromorphone] Nausea Only       Problem List:    Patient Active Problem List   Diagnosis Code    Intestinal obstruction ([facility] [address] K56.609       Past Medical History:  History reviewed. No pertinent past medical history. Past Surgical History:  History reviewed. No pertinent surgical history.     Social History:    Social History     Tobacco Use    Smoking status: Every Day     Types: Cigarettes    Smokeless tobacco: Current   Substance Use Topics    Alcohol use: Not Currently                                Ready to quit: Not Answered  Counseling given: Not Answered      Vital Signs (Current):   Vitals:    08/24/22 1415 08/24/22 1909 08/25/22 0734 08/25/22 1257   BP: 117/76 121/79 125/83 132/77   Pulse: 78 79 78 66   Resp: 18 15 16 16   Temp: 98.2 °F (36.8 °C) 98.4 °F (36.9 °C) 98.4 °F (36.9 °C) 98.1 °F (36.7 °C)   TempSrc: Oral Oral Oral Temporal   SpO2: 100%  99% 96%   Weight: 150 lb (68 kg)      Height: 5' 5\" (1.651 m)                                                 BP Readings from Last 3 Encounters:   08/25/22 132/77   08/17/22 (!) 124/92   02/12/20 126/82       NPO Status: Time of last liquid consumption: 2200                        Time of last solid consumption: 2200                        Date of last liquid consumption: 08/24/22                        Date of last solid food consumption: 08/18/22    BMI:   Wt Readings from Last 3 Encounters:   08/24/22 150 lb (68 kg)   08/19/22 150 lb (68 kg)   08/17/22 150 lb (68 kg)     Body mass index is 24.96 kg/m². CBC:   Lab Results   Component Value Date/Time    WBC 7.6 08/25/2022 05:42 AM    RBC 4.74 08/25/2022 05:42 AM    HGB 13.6 08/25/2022 05:42 AM    HCT 40.4 08/25/2022 05:42 AM    MCV 85.1 08/25/2022 05:42 AM    RDW 14.9 08/25/2022 05:42 AM     08/25/2022 05:42 AM       CMP:   Lab Results   Component Value Date/Time     08/25/2022 05:42 AM    K 4.1 08/25/2022 05:42 AM     08/25/2022 05:42 AM    CO2 24 08/25/2022 05:42 AM    BUN 13 08/25/2022 05:42 AM    CREATININE 0.72 08/25/2022 05:42 AM    GFRAA >60.0 08/25/2022 05:42 AM    LABGLOM >60.0 08/25/2022 05:42 AM    GLUCOSE 98 08/25/2022 05:42 AM    PROT 7.2 08/24/2022 02:51 PM    CALCIUM 9.0 08/25/2022 05:42 AM    BILITOT 0.4 08/24/2022 02:51 PM    ALKPHOS 114 08/24/2022 02:51 PM    AST 8 08/24/2022 02:51 PM    ALT 9 08/24/2022 02:51 PM       POC Tests: No results for input(s): POCGLU, POCNA, POCK, POCCL, POCBUN, POCHEMO, POCHCT in the last 72 hours. Coags: No results found for: PROTIME, INR, APTT    HCG (If Applicable):   Lab Results   Component Value Date    PREGTESTUR Negative 11/09/2016        ABGs: No results found for: PHART, PO2ART, QHK6XWS, NJZ7ECF, BEART, E6THBGYT     Type & Screen (If Applicable):  No results found for: LABABO, LABRH    Drug/Infectious Status (If Applicable):  No results found for: HIV, HEPCAB    COVID-19 Screening (If Applicable): No results found for: COVID19        Anesthesia Evaluation    Airway: Mallampati: II  TM distance: >3 FB   Neck ROM: full  Mouth opening: > = 3 FB   Dental: normal exam         Pulmonary: breath sounds clear to auscultation  (+) current smoker          Patient did not smoke on day of surgery.                  Cardiovascular:    (+) hypertension:,       ECG reviewed  Rhythm: regular             Beta Blocker:  Not on Beta Blocker         Neuro/Psych:   Negative Neuro/Psych ROS              GI/Hepatic/Renal:   (+) GERD: well controlled,           Endo/Other: Negative Endo/Other ROS                    Abdominal:             Vascular: negative vascular ROS. Other Findings:           Anesthesia Plan      general and epidural     ASA 2           MIPS: Prophylactic antiemetics administered. Anesthetic plan and risks discussed with patient. Use of blood products discussed with patient whom consented to blood products.    Plan discussed with CRNA and surgical team.    Attending anesthesiologist reviewed and agrees with Preprocedure content      Post-op pain plan if not by surgeon: continuous epidural            Brooklyn Tavares MD   8/25/2022

## 2022-08-25 NOTE — PROGRESS NOTES
Q3H PRN Estee Rosas MD   1 mg at 22 1442    nicotine (NICODERM CQ) 21 MG/24HR 1 patch  1 patch TransDERmal Daily PRN Glo Turk DO        ketorolac (TORADOL) injection 30 mg  30 mg IntraVENous Q6H PRN Estee Rosas MD   30 mg at 22 1146     Facility-Administered Medications Ordered in Other Encounters   Medication Dose Route Frequency Provider Last Rate Last Admin    fentaNYL (SUBLIMAZE) injection   IntraVENous PRN Tc Pillow, APRN - CRNA   50 mcg at 22 1438    propofol injection   IntraVENous PRN Tc Pillow, APRN - CRNA   50 mg at 22 1437    ondansetron (ZOFRAN) injection   IntraVENous PRN Tc Pillow, APRN - CRNA   4 mg at 22 1438    dexamethasone (DECADRON) injection   IntraVENous PRN Tc Pillow, APRN - CRNA   4 mg at 22 1438    rocuronium (ZEMURON) injection   IntraVENous PRN Tc Pillow, APRN - CRNA   50 mg at 22 1431    lidocaine PF 1 % injection   IntraVENous PRN Tc Pillow, APRN - CRNA   50 mg at 22 1431    metronidazole (FLAGYL) 500 mg in 0.9% NaCl 100 mL IVPB premix   IntraVENous PRN Tc Pillow, APRN - CRNA   500 mg at 22 1447    lactated ringers infusion   IntraVENous Continuous PRN Tc Pillow, APRN - CRNA   New Bag at 22 1426    lidocaine (cardiac) (XYLOCAINE) injection   IntraVENous PRN Tc Pillow, APRN - CRNA   100 mg at 22 1453    phenylephrine (RIZWANA-SYNEPHRINE) injection   IntraVENous PRN Tc Pillow, APRN - CRNA   100 mcg at 22 1539    ePHEDrine injection   IntraVENous PRN Tc Pillow, APRN - CRNA   10 mg at 22 1526       Physical Examination:      Vitals:  /77   Pulse 66   Temp 98.1 °F (36.7 °C) (Temporal)   Resp 16   Ht 5' 5\" (1.651 m)   Wt 150 lb (68 kg)   LMP  (LMP Unknown)   SpO2 96%   BMI 24.96 kg/m²   Temp (24hrs), Av.3 °F (36.8 °C), Min:98.1 °F (36.7 °C), Max:98.4 °F (36.9 °C)      General appearance: alert, cooperative and no distress  Mental Status: oriented to person, place and time and normal affect  Lungs: clear to auscultation bilaterally, normal effort  Heart: regular rate and rhythm, no murmur  Abdomen: Diffuse mild tenderness to palpation. Abdomen is otherwise soft. No guarding or rebound tenderness  Extremities: no edema, redness, tenderness in the calves. Cap refill <2s  Skin: no gross lesions, rashes    Data:     Labs:  Recent Labs     08/24/22  1451 08/25/22  0542   WBC 7.8 7.6   HGB 14.0 13.6    302     Recent Labs     08/24/22  1451 08/25/22  0542    138   K 4.2 4.1   CL 99 104   CO2 25 24   BUN 11 13   CREATININE 0.69 0.72   GLUCOSE 92 98     Recent Labs     08/24/22  1451   AST 8   ALT 9   BILITOT 0.4   ALKPHOS 114       Assessment and Plan:        1. Obstructing colonic neoplasm in the distal sigmoid colon:  -Plan for surgery 8/25. We will follow surgical pathology.   Perioperative pain control, DVT prophylaxis, antibiotics, diet, activity, wound care per surgeon  -Follow daily blood work    Tobacco use disorder  History of diverticulitis with abscess  Hiatal hernia     Diet: Diet NPO Exceptions are: Sips of Clear Liquids, Popsicles, Ice Chips  Full Code    >25 minutes in total care time    Electronically signed by Kari Kendall DO on 8/25/2022 at 3:48 PM

## 2022-08-25 NOTE — OP NOTE
Operative Report      Pre-operative Diagnosis: Intraop consult for hysterectomy due to abdominal mass suspicious for malignancy, with involvement of posterior lower uterus due to local spread. Please refer to primary surgeon's note for details on full presentation and procedure. Post-operative Diagnosis: same     Operation: [facility] BSO     Surgeon: Juan Hernandez M.D     Anesthesia: GETA    Findings: posterior uterine adhesions to rectum     Estimated Blood Loss:  less than 50 ml           Specimens: Uterus . Bilateral tubes. Complications:  None; patient tolerated the procedure well. Disposition: PACU - hemodynamically stable. Procedure Details   Patient scrubbed draped with a midline vertical incision, the uterine corpus was grasped using a double toothed tenaculum, I used the Enseal device, starting on the left side, I identified the left infundibulopelvic ligament which was grasped coagulated and transected followed by the left round ligament and the posterior and anterior leaf of the broad ligament all the way down to the uterine vessels on the left side, which were skeletonized coagulated and transected as well, the bladder flap was dissected from the left side of the bladder was pushed down to the pelvis using a sponge stick. Same steps were carried out on the right side, together with dissection of the bladder flap from the right side to complete the line of dissection that was already done from the left the bladder was completely pushed down, at that point an anterior decision was made in the anterior vaginal cuff contiguous with the cervix, the line was continued using cautery and cut mode, all the way circumferentially around the cervix until the uterus was completely freed from the surrounding vaginal tissue and removed with the ovaries and tubes bilaterally, pedicles were checked and noted to be intact.   Attention was then towards the vaginal cuff which was grasped using Kocher's and closed using 0 Vicryl in an interrupted fashion, the vaginal cuff closure line was noted to be intact and hemostatic. At that point my portion of the procedure was complete, please refer to the general surgeons note for full details of the procedure. Specimen was sent to pathology . I scrubbed out after completing that portion of the surgery. Elva Schaefer M.D., [email or web address].KOG           Elva Schaefer M.D., CHAD.KO G

## 2022-08-25 NOTE — PROGRESS NOTES
Pt. Vital signs stable. Pt. NPO since midnight. Patient awaiting surgery today. Pt. Denies any pain at this time.

## 2022-08-25 NOTE — PROGRESS NOTES
[address] [facility] Dose Adjustment Per Protocol:  Zosyn Extended Interval Interchange      Hermelinda Lovell is a 61 y.o. female. The following ordered dose of Zosyn has been changed to optimize its pharmacodynamic profile per [facility] pharmacy policy approved by [facility]. Recent Labs     08/24/22  1451 08/25/22  0542   CREATININE 0.69 0.72   WBC 7.8 7.6     . Height: 5' 5\" (165.1 cm), Weight: 150 lb (68 kg), Body mass index is 24.96 kg/m². Estimated Creatinine Clearance: 78 mL/min (based on SCr of 0.72 mg/dL). Medication Ordered:   Traditional Dosing   [] Zosyn 2.25 gm q6-8 hr   [x] Zosyn 3.375 gm q6-8 hr   [] Zosyn 4.5 gm q6-8 hr   [] Zosyn 2.25 gm q6-8 hr   [] Zosyn 3.375 gm q6-8 hr   [] Zosyn 4.5 gm q6-8 hr     New Dose      Piperacillin/Tazobactam - Extended Infusion Dosing (4-hour infusion) - Preferred Dosing Strategy       Renal Function (CrCl mL/min)  ? 20  < 20, HD, PD  CRRT    All Indications - Loading dose of 4500 milligrams x 1 over 30 minutes or via IV push. Maintenance dose  should begin 6 hours after load for CrCl > 20 and 8 hours after load for CrCl < 20. Maintenance dosing for all indications except as outlined below  [x] 3375mg q8h  [] 3375mg q12h  [] 3375mg q8h    Febrile neutropenia, Cystic fibrosis, BMI > 40*, local Pseudomonas susceptibility < 80% (refer to page 3 for indications)     [] 4500mg q8h  [] 4500 q12h  [] 4500mg q8h    *Consider 3375mg q8h for indication of Urinary tract infections in BMI > 40. Adjust for decreased renal function.            Thank Devin Portillo, [facility]  8/25/2022 7:38 PM

## 2022-08-25 NOTE — PROGRESS NOTES
OOB to bathroom with steady gait. Medicated with Zofran 4mg for nausea and Toradol 30 mg for 7/10 abdominal pain.

## 2022-08-26 LAB
ALBUMIN SERPL-MCNC: 2.6 G/DL (ref 3.5–4.6)
ALP BLD-CCNC: 55 U/L (ref 40–130)
ALT SERPL-CCNC: 6 U/L (ref 0–33)
ANION GAP SERPL CALCULATED.3IONS-SCNC: 8 MEQ/L (ref 9–15)
AST SERPL-CCNC: 10 U/L (ref 0–35)
BILIRUB SERPL-MCNC: 0.7 MG/DL (ref 0.2–0.7)
BUN BLDV-MCNC: 10 MG/DL (ref 6–20)
CALCIUM SERPL-MCNC: 7.4 MG/DL (ref 8.5–9.9)
CHLORIDE BLD-SCNC: 104 MEQ/L (ref 95–107)
CO2: 27 MEQ/L (ref 20–31)
CREAT SERPL-MCNC: 0.61 MG/DL (ref 0.5–0.9)
GFR AFRICAN AMERICAN: >60
GFR NON-AFRICAN AMERICAN: >60
GLOBULIN: 1.7 G/DL (ref 2.3–3.5)
GLUCOSE BLD-MCNC: 98 MG/DL (ref 70–99)
HCT VFR BLD CALC: 33 % (ref 37–47)
HEMOGLOBIN: 11 G/DL (ref 12–16)
MCH RBC QN AUTO: 29 PG (ref 27–31.3)
MCHC RBC AUTO-ENTMCNC: 33.3 % (ref 33–37)
MCV RBC AUTO: 87.1 FL (ref 82–100)
PDW BLD-RTO: 15.1 % (ref 11.5–14.5)
PHOSPHORUS: 3.8 MG/DL (ref 2.3–4.8)
PLATELET # BLD: 171 K/UL (ref 130–400)
POTASSIUM REFLEX MAGNESIUM: 4 MEQ/L (ref 3.4–4.9)
RBC # BLD: 3.78 M/UL (ref 4.2–5.4)
SODIUM BLD-SCNC: 139 MEQ/L (ref 135–144)
TOTAL PROTEIN: 4.3 G/DL (ref 6.3–8)
WBC # BLD: 12.1 K/UL (ref 4.8–10.8)

## 2022-08-26 PROCEDURE — C9113 INJ PANTOPRAZOLE SODIUM, VIA: HCPCS | Performed by: COLON & RECTAL SURGERY

## 2022-08-26 PROCEDURE — 36415 COLL VENOUS BLD VENIPUNCTURE: CPT

## 2022-08-26 PROCEDURE — 6360000002 HC RX W HCPCS: Performed by: COLON & RECTAL SURGERY

## 2022-08-26 PROCEDURE — 2580000003 HC RX 258: Performed by: STUDENT IN AN ORGANIZED HEALTH CARE EDUCATION/TRAINING PROGRAM

## 2022-08-26 PROCEDURE — 6360000002 HC RX W HCPCS: Performed by: NURSE ANESTHETIST, CERTIFIED REGISTERED

## 2022-08-26 PROCEDURE — A4216 STERILE WATER/SALINE, 10 ML: HCPCS | Performed by: COLON & RECTAL SURGERY

## 2022-08-26 PROCEDURE — 6360000002 HC RX W HCPCS: Performed by: STUDENT IN AN ORGANIZED HEALTH CARE EDUCATION/TRAINING PROGRAM

## 2022-08-26 PROCEDURE — 99212 OFFICE O/P EST SF 10 MIN: CPT

## 2022-08-26 PROCEDURE — 2580000003 HC RX 258: Performed by: ANESTHESIOLOGY

## 2022-08-26 PROCEDURE — 2580000003 HC RX 258: Performed by: COLON & RECTAL SURGERY

## 2022-08-26 PROCEDURE — 2700000000 HC OXYGEN THERAPY PER DAY

## 2022-08-26 PROCEDURE — 84100 ASSAY OF PHOSPHORUS: CPT

## 2022-08-26 PROCEDURE — 2580000003 HC RX 258

## 2022-08-26 PROCEDURE — 80053 COMPREHEN METABOLIC PANEL: CPT

## 2022-08-26 PROCEDURE — 99291 CRITICAL CARE FIRST HOUR: CPT | Performed by: INTERNAL MEDICINE

## 2022-08-26 PROCEDURE — 94150 VITAL CAPACITY TEST: CPT

## 2022-08-26 PROCEDURE — 6360000002 HC RX W HCPCS: Performed by: INTERNAL MEDICINE

## 2022-08-26 PROCEDURE — 6360000002 HC RX W HCPCS: Performed by: NURSE PRACTITIONER

## 2022-08-26 PROCEDURE — 85027 COMPLETE CBC AUTOMATED: CPT

## 2022-08-26 PROCEDURE — P9047 ALBUMIN (HUMAN), 25%, 50ML: HCPCS | Performed by: NURSE PRACTITIONER

## 2022-08-26 PROCEDURE — 2000000000 HC ICU R&B

## 2022-08-26 RX ORDER — SODIUM CHLORIDE, SODIUM LACTATE, POTASSIUM CHLORIDE, CALCIUM CHLORIDE 600; 310; 30; 20 MG/100ML; MG/100ML; MG/100ML; MG/100ML
INJECTION, SOLUTION INTRAVENOUS
Status: COMPLETED
Start: 2022-08-26 | End: 2022-08-26

## 2022-08-26 RX ORDER — ROPIVACAINE HYDROCHLORIDE 5 MG/ML
INJECTION, SOLUTION EPIDURAL; INFILTRATION; PERINEURAL PRN
Status: DISCONTINUED | OUTPATIENT
Start: 2022-08-26 | End: 2022-08-26 | Stop reason: SDUPTHER

## 2022-08-26 RX ORDER — ALBUMIN (HUMAN) 12.5 G/50ML
50 SOLUTION INTRAVENOUS ONCE
Status: COMPLETED | OUTPATIENT
Start: 2022-08-26 | End: 2022-08-26

## 2022-08-26 RX ORDER — 0.9 % SODIUM CHLORIDE 0.9 %
500 INTRAVENOUS SOLUTION INTRAVENOUS 2 TIMES DAILY PRN
Status: DISCONTINUED | OUTPATIENT
Start: 2022-08-26 | End: 2022-08-31 | Stop reason: HOSPADM

## 2022-08-26 RX ORDER — FENTANYL CITRATE 50 UG/ML
25 INJECTION, SOLUTION INTRAMUSCULAR; INTRAVENOUS
Status: DISCONTINUED | OUTPATIENT
Start: 2022-08-26 | End: 2022-08-28

## 2022-08-26 RX ORDER — SODIUM CHLORIDE, SODIUM LACTATE, POTASSIUM CHLORIDE, AND CALCIUM CHLORIDE .6; .31; .03; .02 G/100ML; G/100ML; G/100ML; G/100ML
500 INJECTION, SOLUTION INTRAVENOUS ONCE
Status: COMPLETED | OUTPATIENT
Start: 2022-08-26 | End: 2022-08-26

## 2022-08-26 RX ORDER — ALBUMIN (HUMAN) 12.5 G/50ML
25 SOLUTION INTRAVENOUS EVERY 8 HOURS
Status: DISCONTINUED | OUTPATIENT
Start: 2022-08-26 | End: 2022-08-27

## 2022-08-26 RX ADMIN — PHENYLEPHRINE HYDROCHLORIDE 100 MCG: 10 INJECTION INTRAVENOUS at 14:49

## 2022-08-26 RX ADMIN — SODIUM CHLORIDE: 9 INJECTION, SOLUTION INTRAVENOUS at 17:15

## 2022-08-26 RX ADMIN — SODIUM CHLORIDE 500 ML: 9 INJECTION, SOLUTION INTRAVENOUS at 14:45

## 2022-08-26 RX ADMIN — SODIUM CHLORIDE, PRESERVATIVE FREE 40 MG: 5 INJECTION INTRAVENOUS at 07:27

## 2022-08-26 RX ADMIN — ONDANSETRON 4 MG: 2 INJECTION INTRAMUSCULAR; INTRAVENOUS at 00:00

## 2022-08-26 RX ADMIN — ROPIVACAINE HYDROCHLORIDE: 5 INJECTION EPIDURAL; INFILTRATION; PERINEURAL at 14:16

## 2022-08-26 RX ADMIN — FENTANYL CITRATE 25 MCG: 50 INJECTION, SOLUTION INTRAMUSCULAR; INTRAVENOUS at 12:41

## 2022-08-26 RX ADMIN — PIPERACILLIN AND TAZOBACTAM 3375 MG: 3; .375 INJECTION, POWDER, LYOPHILIZED, FOR SOLUTION INTRAVENOUS at 17:25

## 2022-08-26 RX ADMIN — FENTANYL CITRATE 100 MCG: 50 INJECTION, SOLUTION INTRAMUSCULAR; INTRAVENOUS at 14:04

## 2022-08-26 RX ADMIN — ONDANSETRON 4 MG: 2 INJECTION INTRAMUSCULAR; INTRAVENOUS at 07:36

## 2022-08-26 RX ADMIN — ONDANSETRON 4 MG: 2 INJECTION INTRAMUSCULAR; INTRAVENOUS at 17:18

## 2022-08-26 RX ADMIN — FENTANYL CITRATE 25 MCG: 50 INJECTION, SOLUTION INTRAMUSCULAR; INTRAVENOUS at 11:05

## 2022-08-26 RX ADMIN — SODIUM CHLORIDE, PRESERVATIVE FREE 10 ML: 5 INJECTION INTRAVENOUS at 07:28

## 2022-08-26 RX ADMIN — SODIUM CHLORIDE, PRESERVATIVE FREE 10 ML: 5 INJECTION INTRAVENOUS at 07:27

## 2022-08-26 RX ADMIN — SODIUM CHLORIDE: 9 INJECTION, SOLUTION INTRAVENOUS at 11:45

## 2022-08-26 RX ADMIN — SODIUM CHLORIDE, POTASSIUM CHLORIDE, SODIUM LACTATE AND CALCIUM CHLORIDE 500 ML: 600; 310; 30; 20 INJECTION, SOLUTION INTRAVENOUS at 08:22

## 2022-08-26 RX ADMIN — ALBUMIN (HUMAN) 50 G: 0.25 INJECTION, SOLUTION INTRAVENOUS at 09:49

## 2022-08-26 RX ADMIN — FENTANYL CITRATE 25 MCG: 50 INJECTION, SOLUTION INTRAMUSCULAR; INTRAVENOUS at 19:34

## 2022-08-26 RX ADMIN — PIPERACILLIN AND TAZOBACTAM 3375 MG: 3; .375 INJECTION, POWDER, LYOPHILIZED, FOR SOLUTION INTRAVENOUS at 02:20

## 2022-08-26 RX ADMIN — FENTANYL CITRATE 25 MCG: 50 INJECTION, SOLUTION INTRAMUSCULAR; INTRAVENOUS at 03:32

## 2022-08-26 RX ADMIN — ROPIVACAINE HYDROCHLORIDE 3 ML: 5 INJECTION, SOLUTION EPIDURAL; INFILTRATION; PERINEURAL at 14:04

## 2022-08-26 RX ADMIN — ALBUMIN (HUMAN) 25 G: 0.25 INJECTION, SOLUTION INTRAVENOUS at 17:18

## 2022-08-26 RX ADMIN — SODIUM CHLORIDE: 9 INJECTION, SOLUTION INTRAVENOUS at 03:40

## 2022-08-26 RX ADMIN — PIPERACILLIN AND TAZOBACTAM 3375 MG: 3; .375 INJECTION, POWDER, LYOPHILIZED, FOR SOLUTION INTRAVENOUS at 09:56

## 2022-08-26 RX ADMIN — SODIUM CHLORIDE, SODIUM LACTATE, POTASSIUM CHLORIDE, AND CALCIUM CHLORIDE 500 ML: .6; .31; .03; .02 INJECTION, SOLUTION INTRAVENOUS at 08:22

## 2022-08-26 ASSESSMENT — PAIN SCALES - GENERAL
PAINLEVEL_OUTOF10: 10
PAINLEVEL_OUTOF10: 7
PAINLEVEL_OUTOF10: 10
PAINLEVEL_OUTOF10: 0
PAINLEVEL_OUTOF10: 2
PAINLEVEL_OUTOF10: 2
PAINLEVEL_OUTOF10: 7
PAINLEVEL_OUTOF10: 0
PAINLEVEL_OUTOF10: 6
PAINLEVEL_OUTOF10: 0
PAINLEVEL_OUTOF10: 2
PAINLEVEL_OUTOF10: 7
PAINLEVEL_OUTOF10: 3
PAINLEVEL_OUTOF10: 7
PAINLEVEL_OUTOF10: 0
PAINLEVEL_OUTOF10: 7
PAINLEVEL_OUTOF10: 7

## 2022-08-26 NOTE — PROGRESS NOTES
Wound Ostomy Continence Nurse                                                                                Ostomy Referral Progress Note      NAME:  Germain Gutiérrez  MEDICAL RECORD NUMBER:  [id number]  AGE: 61 y.o. GENDER:  female  :  1965  TODAY'S DATE:  2022    Subjective     Germain Gutiérrez is a 61 y.o. female referred by:   [x] Physician  [] Nursing  [] Other:      colostomy and New    PAST MEDICAL HISTORY:    History reviewed. No pertinent past medical history. MEDICATIONS:    No current facility-administered medications on file prior to encounter. Current Outpatient Medications on File Prior to Encounter   Medication Sig Dispense Refill    Multiple Vitamins-Minerals (THERAPEUTIC MULTIVITAMIN-MINERALS) tablet Take 1 tablet by mouth daily         ALLERGIES:    Allergies   Allergen Reactions    Codeine Nausea And Vomiting    Demerol Hcl [Meperidine] Nausea Only    Dilaudid [Hydromorphone] Nausea Only       PAST SURGICAL HISTORY:    History reviewed. No pertinent surgical history. FAMILY HISTORY:    family history is not on file.     SOCIAL HISTORY:    Social History     Tobacco Use    Smoking status: Every Day     Types: Cigarettes    Smokeless tobacco: Current   Vaping Use    Vaping Use: Never used   Substance Use Topics    Alcohol use: Not Currently    Drug use: Never       LABS:  WBC:    Lab Results   Component Value Date/Time    WBC 12.1 2022 04:48 AM     H/H:    Lab Results   Component Value Date/Time    HGB 11.0 2022 04:48 AM    HCT 33.0 2022 04:48 AM     BMP:    Lab Results   Component Value Date/Time     2022 04:47 AM    K 4.0 2022 04:47 AM     2022 04:47 AM    CO2 27 2022 04:47 AM    BUN 10 2022 04:47 AM    LABALBU 2.6 2022 04:47 AM    CREATININE 0.61 2022 04:47 AM    CALCIUM 7.4 2022 04:47 AM    GFRAA >60.0 2022 04:47 AM    LABGLOM >60.0 2022 04:47 AM GLUCOSE 98 08/26/2022 04:47 AM     PTT:  No results found for: APTT, PTT[APTT}  PT/INR:  No results found for: PROTIME, INR    Objective    BP (!) 111/52   Pulse (!) 111   Temp 98.4 °F (36.9 °C) (Oral)   Resp 20   Ht 5' 5\" (1.651 m)   Wt 148 lb 8 oz (67.4 kg)   LMP  (LMP Unknown)   SpO2 97%   BMI 24.71 kg/m²     Hilton Risk Score Hilton Scale Score: 15    Assessment   Surgeon - Dr. Tristan Handy    Colostomy      Colostomy LUQ (Active)   Stomal Appliance 1 piece;Clean, dry & intact 08/26/22 0950   Flange Size (inches) 3 Inches 08/26/22 0950   Stoma  Assessment Red;Edema;Protrudes; Moist 08/26/22 0950   Peristomal Assessment VEL 08/26/22 0950   Stool Appearance Loose 08/26/22 0950   Stool Color Brown 08/26/22 0950   Stool Amount Medium 08/26/22 0950   Output (mL) 1050 ml 08/26/22 0630   Number of days: 0     Patient is POD #1, s/p abdominal procedure with colostomy. Patient is currently in ICU, uncomfortable from the NG tube. Stoma in the LUQ of the abdomen, red, moist, edematous, and protrudes. A large amount of stool output overnight. Plan for new colostomy education lesson next week with patient and patient's . Supplies provided at the bedside incase it's needed over the weekend.        Intake/Output Summary (Last 24 hours) at 8/26/2022 1322  Last data filed at 8/26/2022 1151  Gross per 24 hour   Intake 36098.27 ml   Output 8040 ml   Net 6038.27 ml       DATE OF LAST BM=  colostomy is functioning    Plan   Plan for Ostomy Care: See above    Ostomy Plan of Care  [x] Supplies/Instructions left in room  [] Patient using home supplies  [x] Brand/supplies at bedside - [employer]    Current Diet: Diet NPO Exceptions are: Sips of Clear Liquids, Popsicles, Ice Chips  Dietician consult:  N/A    Discharge Plan:  Placement for patient upon discharge: home with support    Outpatient visit plan No  Supplies given Yes   Samples requested N/A    Referrals:  []   [] [facility]  [] Supplies  [] Other    Patient/Caregiver Teaching:  Written Instructions given to patient/family - N/A, none today  Teaching provided:  [] Reviewed GI and A&P        [] Supplies  [] Pouch emptying      [] Manipulate closure  [] Routine Care         [] Comment  [] Pouch maintenance           Level of patient/caregiver understanding able to:  [] Indicates understanding       [] Needs reinforcement  [] Unsuccessful      [] Verbal Understanding  [] Demonstrated understanding       [] No evidence of learning  [] Refused teaching         [x] N/A    Electronically signed by LEXA Becerra, RN, [facility] on 8/26/2022 at 1:27 PM

## 2022-08-26 NOTE — PROGRESS NOTES
[address]- Patient arrived from OR. Report received from Hilton Ballesteros 43. This RN and Prasad Reyes RN at bedside recovering patient for initial 30 minutes. Patient arrived on epidural pump gtt. 1930-Dr. Christin Griffin at bedside. Made him aware of the excessive, continuous serosanguineous drainage coming from OH drain. Noted the output coming from colostomy as well. 2015-  Patient's  at bedside. 0300- Patient complaining of increasing pain and nausea. HR elevated in the 120s and patient stating she feels hot and sweaty. Call made to CRNA on call for breakthrough pain medication. Ordered 25 mg fentanyl PRN q1hr. Verified with  and ordered was put in [facility] by Dr. Barbara Parrish. Patient resting comfortably the remainder of the shift with no complaints of pain. Patient is still nauseous.  called 2 times for an update. Handoff report given to Kolton Higginbotham, [facility].

## 2022-08-26 NOTE — PLAN OF CARE
Problem: Discharge Planning  Goal: Discharge to home or other facility with appropriate resources  Outcome: Progressing  Flowsheets (Taken 8/26/2022 0800)  Discharge to home or other facility with appropriate resources: Identify barriers to discharge with patient and caregiver     Problem: ABCDS Injury Assessment  Goal: Absence of physical injury  Outcome: Progressing     Problem: Pain  Goal: Verbalizes/displays adequate comfort level or baseline comfort level  Outcome: Progressing  Flowsheets (Taken 8/26/2022 0000 by Alicia Holden RN)  Verbalizes/displays adequate comfort level or baseline comfort level:   Encourage patient to monitor pain and request assistance   Assess pain using appropriate pain scale     Problem: Nutrition Deficit:  Goal: Optimize nutritional status  8/26/2022 1047 by Nuris Youssef RN  Outcome: Progressing  8/26/2022 1040 by Remington Booker RD, LD  Flowsheets (Taken 8/26/2022 1040)  Nutrient intake appropriate for improving, restoring, or maintaining nutritional needs:   Assess nutritional status and recommend course of action   Monitor oral intake, labs, and treatment plans   Recommend appropriate diets, oral nutritional supplements, and vitamin/mineral supplements     Problem: Skin/Tissue Integrity  Goal: Absence of new skin breakdown  Description: 1. Monitor for areas of redness and/or skin breakdown  2. Assess vascular access sites hourly  3. Every 4-6 hours minimum:  Change oxygen saturation probe site  4. Every 4-6 hours:  If on nasal continuous positive airway pressure, respiratory therapy assess nares and determine need for appliance change or resting period.   Outcome: Progressing     Problem: Neurosensory - Adult  Goal: Achieves stable or improved neurological status  Outcome: Progressing  Goal: Absence of seizures  Outcome: Progressing  Goal: Remains free of injury related to seizures activity  Outcome: Progressing  Goal: Achieves maximal functionality and self care  Outcome: Progressing     Problem: Respiratory - Adult  Goal: Achieves optimal ventilation and oxygenation  Outcome: Progressing     Problem: Cardiovascular - Adult  Goal: Maintains optimal cardiac output and hemodynamic stability  Outcome: Progressing  Goal: Absence of cardiac dysrhythmias or at baseline  Outcome: Progressing     Problem: Skin/Tissue Integrity - Adult  Goal: Skin integrity remains intact  Outcome: Progressing  Goal: Incisions, wounds, or drain sites healing without S/S of infection  Outcome: Progressing  Goal: Oral mucous membranes remain intact  Outcome: Progressing     Problem: Musculoskeletal - Adult  Goal: Return mobility to safest level of function  Outcome: Progressing  Goal: Maintain proper alignment of affected body part  Outcome: Progressing  Goal: Return ADL status to a safe level of function  Outcome: Progressing     Problem: Gastrointestinal - Adult  Goal: Minimal or absence of nausea and vomiting  Outcome: Progressing  Goal: Maintains or returns to baseline bowel function  Outcome: Progressing  Goal: Maintains adequate nutritional intake  Outcome: Progressing  Goal: Establish and maintain optimal ostomy function  Outcome: Progressing     Problem: Genitourinary - Adult  Goal: Absence of urinary retention  Outcome: Progressing  Goal: Urinary catheter remains patent  Outcome: Progressing     Problem: Infection - Adult  Goal: Absence of infection at discharge  Outcome: Progressing  Goal: Absence of infection during hospitalization  Outcome: Progressing  Goal: Absence of fever/infection during anticipated neutropenic period  Outcome: Progressing     Problem: Metabolic/Fluid and Electrolytes - Adult  Goal: Electrolytes maintained within normal limits  Outcome: Progressing  Goal: Hemodynamic stability and optimal renal function maintained  Outcome: Progressing  Goal: Glucose maintained within prescribed range  Outcome: Progressing     Problem: Hematologic - Adult  Goal: Maintains hematologic stability  Outcome: Progressing     Problem: Anxiety  Goal: Will report anxiety at manageable levels  Description: INTERVENTIONS:  1. Administer medication as ordered  2. Teach and rehearse alternative coping skills  3. Provide emotional support with 1:1 interaction with staff  Outcome: Progressing     Problem: Coping  Goal: Pt/Family able to verbalize concerns and demonstrate effective coping strategies  Description: INTERVENTIONS:  1. Assist patient/family to identify coping skills, available support systems and cultural and spiritual values  2. Provide emotional support, including active listening and acknowledgement of concerns of patient and caregivers  3. Reduce environmental stimuli, as able  4. Instruct patient/family in relaxation techniques, as appropriate  5. Assess for spiritual pain/suffering and initiate Spiritual Care, Psychosocial Clinical Specialist consults as needed  Outcome: Progressing     Problem: Change in Body Image  Goal: Pt/Family communicate acceptance of loss or change in body image and feel psychological comfort and peace  Description: INTERVENTIONS:  1. Assess patient/family anxiety and grief process related to change in body image, loss of functional status, loss of sense of self, and forgiveness  2. Provide emotional and spiritual support  3. Provide information about the patient's health status with consideration of family and cultural values  4. Communicate willingness to discuss loss and facilitate grief process with patient/family as appropriate  5. Emphasize sustaining relationships within family system and community, or rachid/spiritual traditions  6. Initiate Spiritual Care, Psychosocial Clinical Specialist consult as needed  Outcome: Progressing     Problem: Decision Making  Goal: Pt/Family able to effectively weigh alternatives and participate in decision making related to treatment and care  Description: INTERVENTIONS:  1.  Determine when there are differences between patient's view, family's view, and healthcare provider's view of condition  2. Facilitate patient and family articulation of goals for care  3. Help patient and family identify pros/cons of alternative solutions  4. Provide information as requested by patient/family  5. Respect patient/family right to receive or not to receive information  6. Serve as a liaison between patient and family and health care team  7. Initiate Consults from Ethics, Palliative Care or initiate [facility] as is appropriate  Outcome: Progressing     Problem: Confusion  Goal: Confusion, delirium, dementia, or psychosis is improved or at baseline  Description: INTERVENTIONS:  1. Assess for possible contributors to thought disturbance, including medications, impaired vision or hearing, underlying metabolic abnormalities, dehydration, psychiatric diagnoses, and notify attending LIP  2. [place] high risk fall precautions, as indicated  3. Provide frequent short contacts to provide reality reorientation, refocusing and direction  4. Decrease environmental stimuli, including noise as appropriate  5. Monitor and intervene to maintain adequate nutrition, hydration, elimination, sleep and activity  6. If unable to ensure safety without constant attention obtain sitter and review sitter guidelines with assigned personnel  7. Initiate Psychosocial CNS and Spiritual Care consult, as indicated  Outcome: Progressing     Problem: Behavior  Goal: Pt/Family maintain appropriate behavior and adhere to behavioral management agreement, if implemented  Description: INTERVENTIONS:  1. Assess patient/family's coping skills and  non-compliant behavior (including use of illegal substances)  2. Notify security of behavior or suspected illegal substances which indicate the need for search of the family and/or belongings  3. Encourage verbalization of thoughts and concerns in a socially appropriate manner  4.  Utilize positive, consistent limit setting strategies supporting safety of patient, staff and others  5. Encourage participation in the decision making process about the behavioral management agreement  6. If a visitor's behavior poses a threat to safety call refer to organization policy. 7. Initiate consult with , Psychosocial CNS, Spiritual Care as appropriate  Outcome: Progressing     Problem: Depression/Self Harm  Goal: Effect of psychiatric condition will be minimized and patient will be protected from self harm  Description: INTERVENTIONS:  1. Assess impact of patient's symptoms on level of functioning, self care needs and offer support as indicated  2. Assess patient/family knowledge of depression, impact on illness and need for teaching  3. Provide emotional support, presence and reassurance  4. Assess for possible suicidal thoughts or ideation. If patient expresses suicidal thoughts or statements do not leave alone, initiate Suicide Precautions, move to a room close to the nursing station and obtain sitter  5. Initiate consults as appropriate with Mental Health Professional, Spiritual Care, Psychosocial CNS, and consider a recommendation to the LIP for a Psychiatric Consultation  Outcome: Progressing     Problem: Abuse/Neglect  Goal: Pt/Caregiver aware of resources to assist with issues of abuse and neglect  Description: INTERVENTIONS:  1. Assess for level of risk and safety  2. Initiate referral to Social Work and notify Licensed Independent Practictioner ([address] [place] [address])  3. Provide appropriate education and resources to patient and/or family  4. Initiate referral to Lenore Hankins, as appropriate  5. Initiate referral to FRANDY Mandel, as appropriate  6. Offer to have the patient's the patient's chart marked as Non-disclosed/Privacy patient for phone inquiries, as appropriate  7.  Provide emotional support, including active listening and acknowledgment of concerns  Outcome: Progressing     Problem: Drug Abuse/Detox  Goal: Will have no detox symptoms and will verbalize plan for changing drug-related behavior  Description: INTERVENTIONS:  1. Administer medication as ordered  2. Monitor physical status  3. Provide emotional support with 1:1 interaction with staff  4. Encourage  recovery focused treatment   Outcome: Progressing     Problem: Spiritual Care  Goal: Pt/Family able to move forward in process of forgiving self, others, and/or higher power  Description: INTERVENTIONS:  1. Assist patient/family to overcome blocks to healing by use of spiritual practices (prayer, meditation, guided imagery, reiki, breath work, etc). 2. De-myth guilt and help patient/family identify possible irrational spiritual/cultural beliefs and values. 3. Explore possibilities of making amends & reconciliation with self, others, and/or a greater power. 4. Guide patient/family in identifying painful feelings of guilt. 5. Help patient/famiy explore and identify spiritual beliefs, cultural understandings or values that may help or hinder letting go of issue. 6. Help patient/family explore feelings of anger, bitterness, resentment. 7. Help patient/family identify and examine the situation in which these feelings are experienced. 8. Help patient/family identify destructive displacement of feelings onto other individuals. 9. Invite use of sacraments/rituals/ceremonies as appropriate (e.g. - confession, anointing, smudging). 10. Refer patient/family to formal counseling and/or to rachid community for further support work.   Outcome: Progressing

## 2022-08-26 NOTE — PROGRESS NOTES
Spiritual Care Services     Summary of Visit:  Pt experiencing some anxiety. Provided prayer, meditation, healing Touch treatment. Pt relaxed deeply. Spoke of the meaning her grandson provides in her life, almost asleep when I finished. Spiritual Assessment/Intervention/Outcomes:    Encounter Summary  Encounter Overview/Reason : Grief, Loss, and Adjustments  Service Provided For[de-identified] Patient  Referral/Consult From[de-identified] Other   Support System: Spouse, Children  Last Encounter : 08/26/22  Complexity of Encounter: Moderate  Begin Time: 1430  End Time : 1450  Total Time Calculated: 20 min  Encounter   Type: Post-Procedural     Spiritual/Emotional needs  Type: Spiritual Support  Rituals, Rites and Sacraments  Type:  (NPO)  Grief, Loss, and Adjustments  Type: Life Adjustments, Adjustment to illness              Values / Beliefs  Do You Have Any Ethnic, Cultural, Sacramental, or Spiritual Yazidi Needs You Would Like Us To Be Aware of While You Are in the Hospital : No    Care Plan:    Ongoing emotional and spiritual support. Spiritual Care Services   Electronically signed by Araceli Girard on 8/26/22 at 2:55 PM EDT     To reach a  for emotional and spiritual support, place an [employer] consult request.   If a  is needed immediately, dial 0 and ask to page the on-call .

## 2022-08-26 NOTE — PROGRESS NOTES
Patient's BP 80s/40s, MAP mid-high 50s. Pt. Asymptomatic. Sleeping in bed but easily arousable, A+Ox4. Dr. Marla Babcock in room and aware. Livier Busby NP made aware, 500cc LR bolus ordered, ordered to give other 500cc if pt. Responds well. Pt. Sinus rhythm-sinus tachy on monitor. Gets tachy into 120s when nauseous/dry heaving. PRN Zofran given. Pt. Satting 100% on 1L NC, removed NC and pt. Satting high 90s, occasionally has sleep apnea and drops to 80s but quickly returns to 90s. Pt. Denies pain. Has fentanyl epidural continuous infusion. Abdomen surgical site dressing clean, dry, intact. Colostomy in place, stoma red and swollen, draining liquid brown stool. HO drain in place with serosanginous fluid. Rothman in place draining kirk urine. NG to LIS. Pt. Given ice chips per order. Resolving hypotension before ambulating pt. Pt. Educated on incentive spirometer use. Pt. Stating she \"feels funny\" in her abdomen. States it is a shooting sensation, but not pain. Dr. Marla Babcock aware. Rounds done. Albumin ordered. MAP increasing with albumin infusion. Pt. Having sudden breakthrough pain at bottom of surgical site. Site assessed, no redness or bleeding. Site tender. Pt. Medicated with PRN fentanyl. Pt. Continuing to have breakthrough pain despite receiving PRN fentanyl in addition to epidural. Pt. Stating that the epidural \"isn't working\". Anesthesia called and in to assess pt. And epidural. Pt. In 10/10 pain. Epidural bolus given by anesthesia and rate increased to 8. Patient states she \"finally has pain relief. \" Pt. Falling asleep. Pt. Desatting to 77% on room air, placed on 4L NC with sats returning to 90s. Pt. Continuing to desat to low 80s even when awake. BP down to 80s/40s. Anesthesiology called and in to see pt. Fluid bolus given per anesthesiology and epidural infusion lowered to 6. No further issues with oxygenation after epidural down to 6. BP remains stable.      End of shift epidural cleared with 20.7cc infused.  Pump previously cleared by anesthesiologist.

## 2022-08-26 NOTE — CONSULTS
History:      reports that she has been smoking cigarettes. She uses smokeless tobacco. She reports that she does not currently use alcohol. She reports that she does not use drugs. TOBACCO:   reports that she has been smoking cigarettes. She uses smokeless tobacco.     ETOH:   reports that she does not currently use alcohol. Family History:   Nonpertinent. Review of Systems  Complete review of systems done and negative unless otherwise noted positive. Objective:     PHYSICAL EXAM:      VITALS:  BP (!) 104/52   Pulse (!) 106   Temp 98.3 °F (36.8 °C) (Oral)   Resp 28   Ht 5' 5\" (1.651 m)   Wt 148 lb 8 oz (67.4 kg)   LMP  (LMP Unknown)   SpO2 96%   BMI 24.71 kg/m²   24HR INTAKE/OUTPUT:    Intake/Output Summary (Last 24 hours) at 2022 1114  Last data filed at 2022 1051  Gross per 24 hour   Intake 43528.41 ml   Output 7060 ml   Net 6892.41 ml     CURRENT PULSE OXIMETRY:  SpO2: 96 %  24HR PULSE OXIMETRY RANGE:  SpO2  Av.1 %  Min: 87 %  Max: 100 %    General appearance -ill appearing and in mild distress  Mental status - alert, oriented to person, place, and time  Eyes - pupils equal and reactive, extraocular eye movements intact  Nose - normal and patent, has NG tube in place  Neck - supple, no significant adenopathy. He has right IJ central line  Chest - clear to auscultation, no wheezes, rales or rhonchi, symmetric air entry  Heart - normal rate, regular rhythm, normal S1, S2, no murmurs, rubs, clicks or gallops  Abdomen -tender to touch, soft, nondistended, dressing in place. Has HO drain.   Rectal - deferred, not clinically indicated  Neurological - alert, oriented, normal speech, no focal findings or movement disorder noted, motor and sensory grossly normal bilaterally  Musculoskeletal - no joint tenderness, deformity or swelling  Extremities - peripheral pulses normal, no pedal edema, no clubbing or cyanosis  Skin - normal coloration and turgor, no rashes, no suspicious skin lesions noted         DATA:    CBC:   Recent Labs     08/24/22  1451 08/25/22  0542 08/26/22  0448   WBC 7.8 7.6 12.1*   HGB 14.0 13.6 11.0*   HCT 41.4 40.4 33.0*    302 171     BMP:    Recent Labs     08/24/22  1451 08/25/22  0542 08/26/22  0447    138 139   K 4.2 4.1 4.0   CL 99 104 104   CO2 25 24 27   BUN 11 13 10   CREATININE 0.69 0.72 0.61   GLUCOSE 92 98 98   CALCIUM 9.5 9.0 7.4*   PHOS  --  3.4 3.8     HEPATIC:   Recent Labs     08/24/22  1451 08/26/22  0447   AST 8 10   ALT 9 6   BILITOT 0.4 0.7   ALKPHOS 114 55            Radiology Review:    CXR portable: Results for orders placed during the hospital encounter of 08/17/22    XR CHEST PORTABLE    Narrative  EXAMINATION: XR CHEST PORTABLE    CLINICAL HISTORY: 78year-old with epigastric pain radiating to the chest    COMPARISONS: None available. FINDINGS: Portable frontal view of the chest was obtained at 13:12 hours. Cardiac and mediastinal contours are within normal limits. Pulmonary vascularity is not distended. No large airspace consolidations, pleural effusions or pneumothoraces. Visualized  bones are intact. Impression  NO RADIOGRAPHIC FINDINGS OF ACUTE CARDIOPULMONARY DISEASE ON PORTABLE PLAIN FILM    Echo:    Assessment/Plan         Impression:    - Large r colon mass status post sigmoidectomy, total abdominal hysterectomy, BSO, antrectomy with debulking.  -Acute blood loss anemia due to above. Status posttransfusion.  - Shock, likely distributive. - Leukocytosis. This is reactive. - Left ureter injury status post stent and surgical repair intraoperatively.  -Third spacing. Likely due to significant abdominal debulking. Recommendations:    -Admitted to the ICU for hemodynamic and airway monitoring.  -Continue gentle hydration.  -We will start albumin.   We will give 50 g now and schedule 25 every 8 hour.  -If continues to be hypotensive will start pressors.  -Strict intake and output.  -Serial lab checks.  -Watch output from HO drain and colostomy.  -Monitor urine output closely.  -Avoid overhydration.  -Continue antibiotics. -Strict glucose control. -DVT and GI prophylaxis. Full Code    Excluding procedures, the total critical care time caring for this patient with life threatening, unstable organ failure, including direct patient contact, review of medical record, management of life support systems, review of data including imaging and labs, discussions with other team members, patient's family and physicians at least 36 minutes so far today.      Electronically signed by Jillian Ch MD on 8/26/2022 at 11:14 AM

## 2022-08-26 NOTE — OP NOTE
[address]                      [address], [address] [facility]                                OPERATIVE REPORT    PATIENT NAME: Fraknie Hernandez                      :        1965  MED REC NO:   [id number]                            ROOM:       06  ACCOUNT NO:   [de-identified]                           ADMIT DATE: 2022  PROVIDER:     Neris Daugherty MD    INTRAOPERATIVE CONSULT DATE:  2022    PERSON REQUESTING CONSULT:  Clary Leyva MD    REASON FOR CONSULTATION:  Intraoperative left distal ureteral injury  during sigmoid colectomy and hysterectomy for sigmoid colon cancer. HISTORY OF PRESENT ILLNESS:  This is a 71-year-old female who apparently  has sigmoid cancer, undergoing a sigmoid resection by our colorectal  surgeon, Dr. Artur Donnelly. This cancer was adherent and involving the  uterus. Dr. Kirt Marc who was involved in the case to help with performance  of a hysterectomy which was required for cancer control, during this  procedure a left distal ureteral complete transection was identified by  Dr. Artur Donnelly. Intraoperative consultation was requested. When I entered the room, Dr. Artur Donnelly had already identified the two  ends of the left ureter in the area of the distal ureter probably 5-6 cm  above the left UVJ. He had already spatulated the ureter and had placed  two 4-0 Chromic sutures in the ureter to approximate it in good fashion  and it was felt that at this point I would perform cystoscopy and  advance the stent and then he could complete an anastomosis with three  or four more 4-0 Chromic sutures and therefore under sterile conditions  the patient's previously placed Rothman was removed. A 23-Portuguese  cystoscope was guided atraumatically through the urethra into the  bladder. The bladder was evaluated. There were no bladder injuries  identified. Both ureteral orifices were identified. There were no  bladder tumors.   At this point, a 0.035 Glidewire over an 6-Cypriot  ureteral catheter was advanced in distal left ureter. Dr. Vahid Brown saw  the Glidewire advance from the distal portion of the ureter into the  proximal portion of the ureter was easily quilled and it was felt to be  likely the collecting system of the kidney. At this point, a 6-Cypriot x  26 cm double-J stent was advanced over the Glidewire. There was some  resistance in passing the stent along the area once it got to the area  of the kidney, but passed otherwise easily. If the wire was removed,  there was noted to be a quill within the bladder, Dr. Vahid Brown was able  to palpate the stent all the way up into an area of likely near the  renal pelvis and there was no coil noted in the more proximal ureter so  likely it was coilling in the kidney, but the stent was certainly across  the two ends of the ureter and through the partial anastomosis, that had  been performed posteriorly and the transected and spatulated distal and  proximal ends of the ureter. I assessed the area of anastomosis, it  appeared to be relatively tension free. The tissue appeared to be  healthy. The spatulation appeared to be adequate. The initial two  sutures that were placed appeared to reapproximate well Dr. Vahid Brown  felt comfortable completing a 2 or 3 more suture anastomosis of the  ureter. He will leave a drain in place and at this point I left the  operating room. The prior portions of this procedure and the remaining  portions of the procedure will be dictated by Dr. Vahid Brown. PREOPERATIVE DIAGNOSIS:  Intraoperative left ureteral injury  transection. PREOPERATIVE DIAGNOSIS:  Intraoperative left ureteral injury  transection. PROCEDURE:  Cystoscopy with left 6-Cypriot x 26 cm double-J stent  insertion.         Ulices Goodwin MD    D: 08/25/2022 18:17:48       T: 08/26/2022 5:16:59     [email or web address]  Job#: [id number]     Doc#: [id number]

## 2022-08-26 NOTE — PLAN OF CARE
Nutrition Problem #1: Inadequate oral intake  Intervention: Food and/or Nutrient Delivery: Continue NPO  Nutritional    Initiation of po diet

## 2022-08-26 NOTE — ADDENDUM NOTE
Addendum  created 08/26/22 1456 by CHANCE Velázquez CRNA    Intraprocedure Meds edited, Order list changed, Pharmacy for encounter modified

## 2022-08-26 NOTE — OP NOTE
[address]                      [address], [address] [facility]                                OPERATIVE REPORT    PATIENT NAME: Oda Lesch                      :        1965  MED REC NO:   [id number]                            ROOM:       06  ACCOUNT NO:   [de-identified]                           ADMIT DATE: 2022  PROVIDER:     Pelon Kennedy MD    DATE OF PROCEDURE:  2022    PREOPERATIVE DIAGNOSIS:  Large bowel obstruction. 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 PROCEDURES:  1. Exploratory laparotomy. 2.  Rectosigmoid resection with en bloc total abdominal hysterectomy and  bilateral salpingo-oophorectomy. 3.  Enterectomy with primary anastomosis. 4.  Cystoscopy with left ureteral repair over stent. 5.  End colostomy. SURGEON:  Pelon Kennedy MD    ASSISTANT:  Ms. Palma Matapool. ANESTHESIA:  1. General endotracheal anesthesia. 2.  Epidural.    ESTIMATED BLOOD LOSS:  1000 mL. IV FLUIDS:  1.  7 L of crystalloid. 2.  3 units of packed red blood cells. 3.  2 units of fresh frozen plasma. INTRAOPERATIVE CONSULTATIONS:  1. Mark Valdes MD, GYN. 2.  Mahesh Fournier MD, Urology. COMPLICATIONS:  None. INDICATIONS:  This a 21-year-old female who has a complete high-grade  large bowel obstruction, symptomatic. She was admitted to the hospital  for pain control and surgical treatment. Attempted bowel preparation  was performed, but unsuccessful. We discussed the risks and benefits of laparotomy with bowel resection  for possible diverticular stricture versus obstructing carcinoma. The  details of those discussions can be found in the office notes and the  history and physical from current admission.     Risks of the procedure including infection, bleeding, damage to  surrounding structures and intestine, reoperation, postoperative pain  and other complications such as transfusion discussed. She was marked  Preoperatively for diverting ostomy. She had Ancef and Flagyl preoperatively. All questions  answered. Consent was obtained.  was present during our  discussions. OPERATIVE PROCEDURE:  She was taken to the operating room, placed in the  supine position. General endotracheal anesthesia was administered. She  had an epidural catheter placed preoperatively. She was placed in [address] [place] [address]. All pressure points were properly padded. Her perineum and  abdomen were prepped and draped with a Betadine-containing solution and  ChloraPrep-containing solution respectively. Ancef and Flagyl given  preoperatively. A time-out was taken for appropriate verification. An incision was made from above the umbilicus to the symphysis pubis. Subcutaneous tissues were incised to the midline, which was opened using  cautery and the remainder of the abdomen was opened under direct  visualization. The sigmoid colon and entire large bowel was markedly dilated to the  level of the sacral promontory. There was a palpable mass present. A  distal loop of small bowel was stuck to this area. This piece of bowel  was pulled away and it appeared to be involved by malignancy. At this time, I freed up the colon along the line of Toldt around the  splenic flexure under direct visualization. No injury to the spleen  seen. The bowel was mobilized down to the level of the pelvic inlet. A contour was used to transect the midsigmoid. The mesentery was taken  in a high fashion down to the sacral promontory. The superior  hemorrhoidal vessels were doubly ligated. At this point, there was an  area where the uterus was adhesed to the rectum and the left pelvic  sidewall was adhesed as well. I began the process of mobilizing this  rectum. The LigaSure device was used with exposure and hemostasis.   I  developed a presacral plane and worked laterally. I got to the point  where the uterus was involved as a T4 tumor. Intraoperative consultation for Dr. Debora Valdez was performed and I assisted  with a total abdominal hysterectomy and bilateral salpingo-oophorectomy,  the details of which can be found in his operative report. Upon completion, I transected the mid rectum with a contour stapler and  this allowed me to remove the specimen en bloc. The specimen was sent  in formalin for permanent section. At this time, I evaluated the left ureter and it appears to have been  transected during the process of en bloc hysterectomy. It was pulled into the pelvic mass. I was able to  find the distal end of the ureter. At this time, Dr. Felisha Presley was intraoperatively consulted and he performed a  cystoscopy. I was able to place the back wall of the ureter in a  spatulated fashion with two interrupted 4-0 chromic suture. A guidewire  was placed across the ureter followed by stents, the details of which  can be found in Dr. Felisha Presley' operative report. I completed the repair with interrupted 4-0 chromic suture, which went  well. There was no leak seen. At this time, a 19-Georgian round Jovan drain was placed in the pelvis. I  freed up the omentum based on the left gastroepiploic vessel and I was  able to use that as an omentopexy in the abdomen for assistance with  postoperative bleeding from this inflamed pelvic area. The left colon was brought out in area in the left upper quadrant which  had been marked preoperatively. It was brought through a cruciate  fashion and the anterior abdominal wall and was mobilized without  problems. I returned back to the distal small bowel where there was an element of  cancer present from its T4 involvement. This segment of small bowel was  transected using 75-mm GERMAINE staplers. A side-to-side ileoileostomy was  performed using a 75-mm GERMAINE stapler and completed with a 60 TA stapler.    The anastomotic crotch was reinforced with 3-0 silk suture. The  mesenteric defect was closed using a running 2-0 Vicryl suture. The small bowel was then run from that anastomosis proximally and the  bowel was placed in normal anatomic position. The lap, needle, instrument and towel counts were all correct. The  nasogastric tube was verified and positioned. Rothman catheter was in  place. The liver was palpated and normal in palpation and appearance. The remainder of the omentum was draped back over the intestines. A 19-Belarusian round  Jovan drain had been placed in the pelvis and brought out laterally and  sutured to the skin. Upon prior to closure, the lap, needle, instrument, towel counts were  again all correct. The fascia was closed using a combination of 0 Prolene and 0 Vicryl  suture. The subcutaneous tissues were irrigated with Irrisept and the  skin was closed loosely with staples with iodoform anna around the  staple line. The colostomy was matured in a Mishel fashion using 3-0 chromic suture. An ostomy appliance was placed. Dressings were applied in the midline. Lap, needle, instrument, towel counts were again all correct. The patient was extubated and taken to the intensive care unit for  postop monitoring.         Ashley Patel MD    D: 08/25/2022 19:33:57       T: 08/25/2022 19:40:01     [email or web address]  Job#: [id number]     Doc#: [id number]    CC:

## 2022-08-26 NOTE — PROGRESS NOTES
Physician Progress Note    8/26/2022   4:06 PM    Name:  Irene Vega  MRN:    [id number]     IP Day: 2     Admit Date: 8/24/2022  1:48 PM  PCP: Gunjan Villafana MD    Code Status:  Full Code    Subjective:     She is having abdominal soreness but it is tolerable at the moment. NG tube in place.     Current Facility-Administered Medications   Medication Dose Route Frequency Provider Last Rate Last Admin    fentaNYL (SUBLIMAZE) injection 25 mcg  25 mcg IntraVENous Q1H PRN Jamie Cotto MD   25 mcg at 08/26/22 1241    albumin human 25 % IV solution 25 g  25 g IntraVENous Q8H CHANCE Grimm - CNP        0.9 % sodium chloride bolus  500 mL IntraVENous BID PRN Parish Jiang, DO   Stopped at 08/26/22 1518    pantoprazole (PROTONIX) 40 mg in sodium chloride (PF) 10 mL injection  40 mg IntraVENous Daily Bryan Elizabeth MD   40 mg at 08/26/22 0727    naloxone 0.4 mg in 10 mL sodium chloride syringe   IntraVENous PRN Bryan Elizabeth MD        sodium chloride flush 0.9 % injection 5-40 mL  5-40 mL IntraVENous 2 times per day Deep Motta MD   10 mL at 08/26/22 0728    sodium chloride flush 0.9 % injection 5-40 mL  5-40 mL IntraVENous PRN Deep Motta MD        0.9 % sodium chloride infusion  25 mL IntraVENous PRN Deep Motta MD        fentaNYL (SUBLIMAZE) injection 50 mcg  50 mcg IntraVENous Q10 Min PRN Yadira Martinez MD        fentaNYL (SUBLIMAZE) 2 mcg/mL, ropivacaine (NAROPIN) 0.2 % in sodium chloride 0.9 % 100 mL epidural   Epidural Continuous Kian Fuentes, DO 6 mL/hr at 08/26/22 1533 Rate Change at 08/26/22 1533    sodium chloride flush 0.9 % injection 5-40 mL  5-40 mL IntraVENous 2 times per day Bryan Elizabeth MD   10 mL at 08/26/22 0727    sodium chloride flush 0.9 % injection 5-40 mL  5-40 mL IntraVENous PRN Bryan Elizabeth MD        0.9 % sodium chloride infusion   IntraVENous PRN Bryan Elizabeth MD        ondansetron (ZOFRAN-ODT) disintegrating tablet 4 mg  4 mg Oral Q8H PRN Sapna Laguerre MD        Or    ondansetron [facility]) injection 4 mg  4 mg IntraVENous Q6H PRN Sapna Laguerre MD        0.9 % sodium chloride infusion   IntraVENous Continuous Sapna Laguerre  mL/hr at 22 1150 Rate Verify at 22 1150    piperacillin-tazobactam (ZOSYN) 3,375 mg in dextrose 5 % 50 mL IVPB extended infusion (mini-bag)  3,375 mg IntraVENous Q8H Sapna Laguerre MD   Stopped at 22 1356    sodium chloride flush 0.9 % injection 5-40 mL  5-40 mL IntraVENous 2 times per day Sapna Laguerre MD   10 mL at 22 0728    sodium chloride flush 0.9 % injection 5-40 mL  5-40 mL IntraVENous PRN Sapna Laguerre MD        0.9 % sodium chloride infusion   IntraVENous PRN Sapna Laguerre MD        ondansetron (ZOFRAN-ODT) disintegrating tablet 4 mg  4 mg Oral Q8H PRN Sapna Laguerre MD        Or    ondansetron [facility]) injection 4 mg  4 mg IntraVENous Q6H PRN Sapna Laguerre MD   4 mg at 22 0736    HYDROmorphone (DILAUDID) injection 0.5 mg  0.5 mg IntraVENous Q3H PRN Sapna Laguerre MD        Or    HYDROmorphone (DILAUDID) injection 1 mg  1 mg IntraVENous Q3H PRN Sapna Laguerre MD   1 mg at 22 1442    nicotine (NICODERM CQ) 21 MG/24HR 1 patch  1 patch TransDERmal Daily PRN Sapna Laguerre MD        ketorolac (TORADOL) injection 30 mg  30 mg IntraVENous Q6H PRN Sapna Laguerre MD   30 mg at 22 1146       Physical Examination:      Vitals:  BP (!) 107/47   Pulse 81   Temp 98.4 °F (36.9 °C) (Oral)   Resp 15   Ht 5' 5\" (1.651 m)   Wt 148 lb 8 oz (67.4 kg)   LMP  (LMP Unknown)   SpO2 100%   BMI 24.71 kg/m²   Temp (24hrs), Av.9 °F (36.6 °C), Min:95.8 °F (35.4 °C), Max:98.6 °F (37 °C)      General appearance: Tired appearing but cooperative and in no distress.   NG tube in place  Mental Status: oriented to person, place and time and normal affect  Lungs: clear to auscultation bilaterally, normal effort  Heart: regular rate and rhythm, no murmur  Abdomen: Diffuse mild tenderness to palpation with extensive bandaging and colostomy noted  Extremities: no edema, redness, tenderness in the calves. Cap refill <2s    Data:     Labs:  Recent Labs     08/25/22  0542 08/26/22  0448   WBC 7.6 12.1*   HGB 13.6 11.0*    171     Recent Labs     08/25/22  0542 08/26/22  0447    139   K 4.1 4.0    104   CO2 24 27   BUN 13 10   CREATININE 0.72 0.61   GLUCOSE 98 98     Recent Labs     08/24/22  1451 08/26/22  0447   AST 8 10   ALT 9 6   BILITOT 0.4 0.7   ALKPHOS 114 55       Assessment and Plan:        1. Obstructing colonic neoplasm in the distal sigmoid colon s/p ex lap 8/25 with rectosigmoid resection, total abdominal hysterectomy and bilateral salpingo-oophorectomy, enterectomy, left ureteral repair, and end colostomy  -Follow surgical pathology. Postoperative pain control, DVT prophylaxis, antibiotics, diet, activity, wound care per surgeon  -Follow daily blood work    2.   Shock likely multifactorial-hypovolemic, possibly septic  -IV antibiotic.  -Pressor support to maintain MAP greater than 65  -Monitor hemoglobin    Tobacco use disorder  History of diverticulitis with abscess  Hiatal hernia     Diet: Diet NPO Exceptions are: Sips of Clear Liquids, Popsicles, Ice Chips  Full Code    >35 minutes in total care time    Electronically signed by Gabino Lovell DO on 8/26/2022 at 4:06 PM

## 2022-08-26 NOTE — ADDENDUM NOTE
Addendum  created 08/26/22 1410 by CHANCE Cardenas CRNA    Intraprocedure Event edited, Intraprocedure Meds edited, Order list changed, Orders acknowledged in Narrator

## 2022-08-26 NOTE — CARE COORDINATION
Quality round completed. LSW meet with pt at bedside. Discuss DC plan. Pt agree with DC plan home with . Pt is currently between [facility] or [facility]. LSW will follow.      Electronically signed by MIAN Villa on 8/26/2022 at 10:33 AM

## 2022-08-27 LAB
ANION GAP SERPL CALCULATED.3IONS-SCNC: 8 MEQ/L (ref 9–15)
BASOPHILS ABSOLUTE: 0 K/UL (ref 0–0.2)
BASOPHILS RELATIVE PERCENT: 0.3 %
BUN BLDV-MCNC: 5 MG/DL (ref 6–20)
CALCIUM SERPL-MCNC: 8.4 MG/DL (ref 8.5–9.9)
CHLORIDE BLD-SCNC: 106 MEQ/L (ref 95–107)
CO2: 23 MEQ/L (ref 20–31)
CREAT SERPL-MCNC: 0.52 MG/DL (ref 0.5–0.9)
EOSINOPHILS ABSOLUTE: 0.1 K/UL (ref 0–0.7)
EOSINOPHILS RELATIVE PERCENT: 0.8 %
GFR AFRICAN AMERICAN: >60
GFR NON-AFRICAN AMERICAN: >60
GLUCOSE BLD-MCNC: 80 MG/DL (ref 70–99)
HCT VFR BLD CALC: 25.2 % (ref 37–47)
HEMOGLOBIN: 8.7 G/DL (ref 12–16)
LYMPHOCYTES ABSOLUTE: 1.1 K/UL (ref 1–4.8)
LYMPHOCYTES RELATIVE PERCENT: 8.6 %
MCH RBC QN AUTO: 29.3 PG (ref 27–31.3)
MCHC RBC AUTO-ENTMCNC: 34.4 % (ref 33–37)
MCV RBC AUTO: 85.1 FL (ref 82–100)
MONOCYTES ABSOLUTE: 0.5 K/UL (ref 0.2–0.8)
MONOCYTES RELATIVE PERCENT: 4.1 %
NEUTROPHILS ABSOLUTE: 10.6 K/UL (ref 1.4–6.5)
NEUTROPHILS RELATIVE PERCENT: 86.2 %
PDW BLD-RTO: 15.4 % (ref 11.5–14.5)
PHOSPHORUS: 1.9 MG/DL (ref 2.3–4.8)
PLATELET # BLD: 136 K/UL (ref 130–400)
POTASSIUM REFLEX MAGNESIUM: 3.6 MEQ/L (ref 3.4–4.9)
RBC # BLD: 2.96 M/UL (ref 4.2–5.4)
SODIUM BLD-SCNC: 137 MEQ/L (ref 135–144)
WBC # BLD: 12.3 K/UL (ref 4.8–10.8)

## 2022-08-27 PROCEDURE — 6360000002 HC RX W HCPCS: Performed by: STUDENT IN AN ORGANIZED HEALTH CARE EDUCATION/TRAINING PROGRAM

## 2022-08-27 PROCEDURE — 6360000002 HC RX W HCPCS: Performed by: COLON & RECTAL SURGERY

## 2022-08-27 PROCEDURE — A4216 STERILE WATER/SALINE, 10 ML: HCPCS | Performed by: COLON & RECTAL SURGERY

## 2022-08-27 PROCEDURE — 80048 BASIC METABOLIC PNL TOTAL CA: CPT

## 2022-08-27 PROCEDURE — 1210000000 HC MED SURG R&B

## 2022-08-27 PROCEDURE — P9047 ALBUMIN (HUMAN), 25%, 50ML: HCPCS | Performed by: NURSE PRACTITIONER

## 2022-08-27 PROCEDURE — 2580000003 HC RX 258: Performed by: NURSE PRACTITIONER

## 2022-08-27 PROCEDURE — 85025 COMPLETE CBC W/AUTO DIFF WBC: CPT

## 2022-08-27 PROCEDURE — 99024 POSTOP FOLLOW-UP VISIT: CPT | Performed by: COLON & RECTAL SURGERY

## 2022-08-27 PROCEDURE — 36415 COLL VENOUS BLD VENIPUNCTURE: CPT

## 2022-08-27 PROCEDURE — 2580000003 HC RX 258: Performed by: ANESTHESIOLOGY

## 2022-08-27 PROCEDURE — 2580000003 HC RX 258: Performed by: STUDENT IN AN ORGANIZED HEALTH CARE EDUCATION/TRAINING PROGRAM

## 2022-08-27 PROCEDURE — 99233 SBSQ HOSP IP/OBS HIGH 50: CPT | Performed by: INTERNAL MEDICINE

## 2022-08-27 PROCEDURE — 2580000003 HC RX 258: Performed by: COLON & RECTAL SURGERY

## 2022-08-27 PROCEDURE — C9113 INJ PANTOPRAZOLE SODIUM, VIA: HCPCS | Performed by: COLON & RECTAL SURGERY

## 2022-08-27 PROCEDURE — 6360000002 HC RX W HCPCS: Performed by: INTERNAL MEDICINE

## 2022-08-27 PROCEDURE — 84100 ASSAY OF PHOSPHORUS: CPT

## 2022-08-27 PROCEDURE — 6360000002 HC RX W HCPCS: Performed by: NURSE PRACTITIONER

## 2022-08-27 PROCEDURE — 2500000003 HC RX 250 WO HCPCS: Performed by: NURSE PRACTITIONER

## 2022-08-27 RX ORDER — DEXTROSE, SODIUM CHLORIDE, SODIUM LACTATE, POTASSIUM CHLORIDE, AND CALCIUM CHLORIDE 5; .6; .31; .03; .02 G/100ML; G/100ML; G/100ML; G/100ML; G/100ML
INJECTION, SOLUTION INTRAVENOUS CONTINUOUS
Status: DISCONTINUED | OUTPATIENT
Start: 2022-08-27 | End: 2022-08-29

## 2022-08-27 RX ADMIN — SODIUM CHLORIDE: 9 INJECTION, SOLUTION INTRAVENOUS at 09:16

## 2022-08-27 RX ADMIN — PIPERACILLIN AND TAZOBACTAM 3375 MG: 3; .375 INJECTION, POWDER, LYOPHILIZED, FOR SOLUTION INTRAVENOUS at 09:04

## 2022-08-27 RX ADMIN — ROPIVACAINE HYDROCHLORIDE: 5 INJECTION EPIDURAL; INFILTRATION; PERINEURAL at 19:36

## 2022-08-27 RX ADMIN — ROPIVACAINE HYDROCHLORIDE: 5 INJECTION EPIDURAL; INFILTRATION; PERINEURAL at 05:00

## 2022-08-27 RX ADMIN — ALBUMIN (HUMAN) 25 G: 0.25 INJECTION, SOLUTION INTRAVENOUS at 06:19

## 2022-08-27 RX ADMIN — SODIUM CHLORIDE, PRESERVATIVE FREE 10 ML: 5 INJECTION INTRAVENOUS at 09:16

## 2022-08-27 RX ADMIN — SODIUM CHLORIDE, PRESERVATIVE FREE 10 ML: 5 INJECTION INTRAVENOUS at 09:05

## 2022-08-27 RX ADMIN — SODIUM CHLORIDE, PRESERVATIVE FREE 40 MG: 5 INJECTION INTRAVENOUS at 08:59

## 2022-08-27 RX ADMIN — SODIUM CHLORIDE, PRESERVATIVE FREE 10 ML: 5 INJECTION INTRAVENOUS at 09:07

## 2022-08-27 RX ADMIN — KETOROLAC TROMETHAMINE 30 MG: 30 INJECTION, SOLUTION INTRAMUSCULAR; INTRAVENOUS at 18:03

## 2022-08-27 RX ADMIN — ONDANSETRON 4 MG: 2 INJECTION INTRAMUSCULAR; INTRAVENOUS at 17:31

## 2022-08-27 RX ADMIN — PIPERACILLIN AND TAZOBACTAM 3375 MG: 3; .375 INJECTION, POWDER, LYOPHILIZED, FOR SOLUTION INTRAVENOUS at 02:14

## 2022-08-27 RX ADMIN — PIPERACILLIN AND TAZOBACTAM 3375 MG: 3; .375 INJECTION, POWDER, LYOPHILIZED, FOR SOLUTION INTRAVENOUS at 17:56

## 2022-08-27 RX ADMIN — FENTANYL CITRATE 25 MCG: 50 INJECTION, SOLUTION INTRAMUSCULAR; INTRAVENOUS at 13:13

## 2022-08-27 RX ADMIN — ONDANSETRON 4 MG: 2 INJECTION INTRAMUSCULAR; INTRAVENOUS at 05:04

## 2022-08-27 RX ADMIN — SODIUM CHLORIDE, SODIUM LACTATE, POTASSIUM CHLORIDE, CALCIUM CHLORIDE AND DEXTROSE MONOHYDRATE: 5; 600; 310; 30; 20 INJECTION, SOLUTION INTRAVENOUS at 14:32

## 2022-08-27 RX ADMIN — POTASSIUM PHOSPHATE, MONOBASIC POTASSIUM PHOSPHATE, DIBASIC 20 MMOL: 224; 236 INJECTION, SOLUTION, CONCENTRATE INTRAVENOUS at 14:35

## 2022-08-27 RX ADMIN — ONDANSETRON 4 MG: 2 INJECTION INTRAMUSCULAR; INTRAVENOUS at 11:22

## 2022-08-27 RX ADMIN — ONDANSETRON 4 MG: 2 INJECTION INTRAMUSCULAR; INTRAVENOUS at 23:42

## 2022-08-27 RX ADMIN — SODIUM CHLORIDE: 9 INJECTION, SOLUTION INTRAVENOUS at 00:55

## 2022-08-27 ASSESSMENT — PAIN SCALES - GENERAL
PAINLEVEL_OUTOF10: 0
PAINLEVEL_OUTOF10: 1
PAINLEVEL_OUTOF10: 0
PAINLEVEL_OUTOF10: 7
PAINLEVEL_OUTOF10: 0
PAINLEVEL_OUTOF10: 7
PAINLEVEL_OUTOF10: 1
PAINLEVEL_OUTOF10: 2
PAINLEVEL_OUTOF10: 6
PAINLEVEL_OUTOF10: 0
PAINLEVEL_OUTOF10: 4
PAINLEVEL_OUTOF10: 0
PAINLEVEL_OUTOF10: 1
PAINLEVEL_OUTOF10: 0
PAINLEVEL_OUTOF10: 0
PAINLEVEL_OUTOF10: 2
PAINLEVEL_OUTOF10: 0

## 2022-08-27 ASSESSMENT — PAIN DESCRIPTION - DESCRIPTORS
DESCRIPTORS: SORE
DESCRIPTORS: SORE;SHARP
DESCRIPTORS: SORE
DESCRIPTORS: SHARP;SORE
DESCRIPTORS: SORE

## 2022-08-27 ASSESSMENT — PAIN DESCRIPTION - LOCATION
LOCATION: ABDOMEN

## 2022-08-27 ASSESSMENT — PAIN DESCRIPTION - ORIENTATION
ORIENTATION: MID
ORIENTATION: MID

## 2022-08-27 NOTE — CARE COORDINATION
PT REMAINS IN ICU. NG CLAMPED TODAY. POSSIBLE TRANSFER TO [facility] TODAY. LSW/CM WILL CONTINUE TO FOLLOW FOR NEEDS.

## 2022-08-27 NOTE — PROGRESS NOTES
CREATININE 0.72 0.61 0.52   CALCIUM 9.0 7.4* 8.4*   PHOS 3.4 3.8 1.9*     Recent Labs     08/24/22  1451 08/26/22  0447   AST 8 10   ALT 9 6   BILITOT 0.4 0.7   ALKPHOS 114 55     No results for input(s): INR in the last 72 hours. No results for input(s): Cherre Gash in the last 72 hours.     Urinalysis:      Lab Results   Component Value Date/Time    NITRU Negative 08/17/2022 01:15 PM    WBCUA 0-2 11/09/2016 03:45 PM    BACTERIA Rare 11/09/2016 03:45 PM    RBCUA 0-2 11/09/2016 03:45 PM    BLOODU Negative 08/17/2022 01:15 PM    SPECGRAV 1.006 08/17/2022 01:15 PM    GLUCOSEU Negative 08/17/2022 01:15 PM       Radiology:  No orders to display           Assessment/Plan:    62 y/o female with history of tobacco use disorder, HH, diverticulitis with abscess who presented with:    Obstructing colonic neoplasm in the distal sigmoid colon   - s/p ex lap with rectosigmoid resection, total abdominal hysterectomy and bilateral salpingo-oophorectomy, enterectomy, left ureteral repair, and end colostomy on 8/25  - postoperative pain control, DVT prophylaxis, antibiotics, diet, activity, wound care per primary service    Shock   - likely multifactorial in the setting of acute blood loss, possibly septic  - s/p PRBC transfusion  - on IV Zosyn  - management per critical care and primary service  - follow H/H     Leukocytosis   - likely reactive, follow trend        Diet: Diet NPO Exceptions are: Sips of Clear Liquids, Popsicles, Ice Chips    Code Status: Full Code            Electronically signed by Kenneth Pearl MD on 8/27/2022 at 9:47 AM

## 2022-08-27 NOTE — FLOWSHEET NOTE
Patient oob in chair today for 2 hrs and 45 minutes. Patient had 40 cc out of ng tube after 6 hrs of being clamped. Ng tube discontinued.

## 2022-08-27 NOTE — PROGRESS NOTES
Pulmonary & Critical Care Medicine ICU Progress Note  Chief complaint : ICU management    Subjunctive/24 hour events :   Patient seen and examined during multidisciplinary rounds with RN, charge nurse, RT, pharmacy, dietitian, and social service. Patient is feeling slightly better today. Patient was able to get up into the chair yesterday and tolerated well. Patient has epidural for pain control, patient states it is controlling her pain. She is currently on room air O2 sats are 100%. No fever overnight, urine output 2300 for 24 hours. Colostomy drain 200 overnight. Patient is tolerating ice chips, will clamp NG couple hours today to see how she tolerates. Social History     Tobacco Use    Smoking status: Every Day     Types: Cigarettes    Smokeless tobacco: Current   Substance Use Topics    Alcohol use: Not Currently     History reviewed. No pertinent family history. No results for input(s): PHART, JDI3HGO, PO2ART in the last 72 hours. MV Settings:     / / /            IV:   sodium chloride      fentanyl epidural builder 6 mL/hr at 08/27/22 0500    sodium chloride      sodium chloride 125 mL/hr at 08/27/22 0916    sodium chloride         Vitals:  BP (!) 107/50   Pulse (!) 109   Temp 99.6 °F (37.6 °C) (Oral)   Resp 24   Ht 5' 5\" (1.651 m)   Wt 148 lb 8 oz (67.4 kg)   LMP  (LMP Unknown)   SpO2 100%   BMI 24.71 kg/m²    Tmax:       Intake/Output Summary (Last 24 hours) at 8/27/2022 1131  Last data filed at 8/27/2022 0744  Gross per 24 hour   Intake 2693.17 ml   Output 2855 ml   Net -161.83 ml       EXAM:    General: alert, cooperative  Head: normocephalic, atraumatic  Eyes:No gross abnormalities. ENT:  MMM no lesions  Neck:  supple and no masses  Chest : Air movement no wheezes no rales diminished bases  Heart[de-identified] Heart sounds are normal.  Regular rate and rhythm without murmur, gallop or rub.   ABD:  bowel sounds normal, soft,tender to touch colostomy  Musculoskeletal : no cyanosis, no clubbing, and no edema  Neuro:  Grossly normal  Skin: No rashes or nodules noted. Lymph node:  no cervical nodes  Urology: Yes [facility]   Psychiatric: appropriate    Medications:  Scheduled Meds:   albumin human  25 g IntraVENous Q8H    pantoprazole (PROTONIX) 40 mg injection  40 mg IntraVENous Daily    sodium chloride flush  5-40 mL IntraVENous 2 times per day    sodium chloride flush  5-40 mL IntraVENous 2 times per day    piperacillin-tazobactam  3,375 mg IntraVENous Q8H    sodium chloride flush  5-40 mL IntraVENous 2 times per day       PRN Meds:  fentanNYL, sodium chloride, naloxone, sodium chloride flush, sodium chloride, fentanNYL, sodium chloride flush, sodium chloride, ondansetron **OR** ondansetron, sodium chloride flush, sodium chloride, ondansetron **OR** ondansetron, HYDROmorphone **OR** HYDROmorphone, nicotine, ketorolac    Results: reviewed by me   CBC:   Recent Labs     08/25/22  0542 08/26/22 0448 08/27/22  0422   WBC 7.6 12.1* 12.3*   HGB 13.6 11.0* 8.7*   HCT 40.4 33.0* 25.2*   MCV 85.1 87.1 85.1    171 136     BMP:   Recent Labs     08/25/22  0542 08/26/22  0447 08/27/22  0422    139 137   K 4.1 4.0 3.6    104 106   CO2 24 27 23   PHOS 3.4 3.8 1.9*   BUN 13 10 5*   CREATININE 0.72 0.61 0.52     LIVER PROFILE:   Recent Labs     08/24/22  1451 08/26/22 0447   AST 8 10   ALT 9 6   BILITOT 0.4 0.7   ALKPHOS 114 55     PT/INR: No results for input(s): PROTIME, INR in the last 72 hours. APTT: No results for input(s): APTT in the last 72 hours. UA:No results for input(s): NITRITE, COLORU, PHUR, LABCAST, WBCUA, RBCUA, MUCUS, TRICHOMONAS, YEAST, BACTERIA, CLARITYU, SPECGRAV, LEUKOCYTESUR, UROBILINOGEN, BILIRUBINUR, BLOODU, GLUCOSEU, AMORPHOUS in the last 72 hours.     Invalid input(s): KETONESU    Cultures:    CT ABDOMEN PELVIS W IV CONTRAST Additional Contrast? None    Result Date: 8/17/2022  CT ABDOMEN PELVIS W IV CONTRAST HISTORY:   Diffuse abdominal pain, worse in the left lower quadrant. Nausea. Constipation. Bloating. History of diverticulitis. TECHNIQUE: CT of the abdomen and pelvis was performed using standard technique, scanning from just above the dome of the diaphragm to the symphysis pubis. Sagittal and coronal performed. Contrast: IV: 50 ml Isovue 300 Oral:  None. All CT scans at this facility use dose modulation, iterative reconstruction, and/or weight based dosing when appropriate to reduce radiation dose to as low as reasonably achievable. COMPARISON: CT 11/9/2016. RESULT: Liver: No mass or lesion. Biliary: Gallbladder unremarkable. No bile duct dilation. Pancreas: No mass or duct dilation. Spleen: No mass or splenomegaly. Adrenals: No mass. Kidneys: No calculus or hydronephrosis. No suspicious renal lesions. GI tract: Extensive feces throughout the colon. Focal area of masslike wall thickening (\"apple core lesion\") within the distal sigmoid colon within the pelvis suspicious for obstructing neoplasm, with associated distention of the upstream sigmoid, descending, transverse, and ascending colon. Small bowel normal caliber. Moderate hiatal hernia. Diverticulosis without evidence for acute diverticulitis. Lymph nodes: No abdominal or pelvic lymphadenopathy. Mesentery/Peritoneum/Retroperitoneum: No ascites or mass. Vasculature: The celiac axis and SMA are patent. The portal vein and branches, splenic vein, SMV, and hepatic veins are patent. Mild arterial atherosclerotic disease without aneurysm. Pelvis: No significant free fluid. Uterus grossly unremarkable. Bladder decompressed. See above for colonic findings. Bones: No acute osseous findings. No destructive osseous lesions. Soft tissues: Unremarkable. Lower thorax: Unremarkable. Findings suspicious for obstructing colonic neoplasm in the distal sigmoid colon.  Further evaluation with colonoscopy is suggested. ==========     XR CHEST PORTABLE    Result Date: 8/17/2022  EXAMINATION: XR CHEST PORTABLE CLINICAL HISTORY: 78year-old with epigastric pain radiating to the chest COMPARISONS: None available. FINDINGS: Portable frontal view of the chest was obtained at 13:12 hours. Cardiac and mediastinal contours are within normal limits. Pulmonary vascularity is not distended. No large airspace consolidations, pleural effusions or pneumothoraces. Visualized  bones are intact. NO RADIOGRAPHIC FINDINGS OF ACUTE CARDIOPULMONARY DISEASE ON PORTABLE PLAIN FILM    Most recent    Chest CT      WITH CONTRAST:No results found for this or any previous visit. WITHOUT CONTRAST: No results found for this or any previous visit. CXR      2-view: No results found for this or any previous visit. Portable: Results for orders placed during the hospital encounter of 08/17/22    XR CHEST PORTABLE    Narrative  EXAMINATION: XR CHEST PORTABLE    CLINICAL HISTORY: 78year-old with epigastric pain radiating to the chest    COMPARISONS: None available. FINDINGS: Portable frontal view of the chest was obtained at 13:12 hours. Cardiac and mediastinal contours are within normal limits. Pulmonary vascularity is not distended. No large airspace consolidations, pleural effusions or pneumothoraces. Visualized  bones are intact. Impression  NO RADIOGRAPHIC FINDINGS OF ACUTE CARDIOPULMONARY DISEASE ON PORTABLE PLAIN FILM      Echo No results found for this or any previous visit. Assessment:   This is a critically ill patient at risk of deterioration / death , needing close ICU monitoring and intervention due to below noted problems   Status post op day 1 myectomy, total abdominal hysterectomy, BSO, with debulking  Acute blood loss anemia due to above, improved  Leukocytosis  Left ureter injury status post stent and surgical repair intraoperatively    Recommendations  Up to chair today  Pulmonary hygiene  Clamp NG per surgeon's recommendations  Okay to start clear liquids  Monitor urine output and avoid overload  Continue antibiotics  Maintain blood sugar 140-180  DVT prophylaxis SCDs  PUD prophylaxis  Epidural for pain  Continue IV fluids until taking adequate p.o.   Transfer to floor today                Electronically signed by CHANCE Dyson - CNP,  FCCP ,on 8/27/2022 at 11:31 AM

## 2022-08-27 NOTE — PROGRESS NOTES
Pt Name: Iman Juárez  Medical Record Number: [id number]  Date of Birth 1965   Admit date 2022  1:48 PM  Today's Date: 2022     ASSESSMENT  1. Hospital day # 3  2 postop day #2 en bloc rectosigmoid/BILLIE/BSO/enterectomy for high-grade obstruction to suspected malignancy    PLAN  1. Clamp NG tube  2. Out of bed  3. Transfer to floor when okay with intensivist    SUBJECTIVE  Chief complaint: Follow-up bowel resection/hysterectomy  Afebrile, vital signs are stable. She denies any nausea or vomiting, colostomy functioning She is tolerating a Diet NPO Exceptions are: Sips of Clear Liquids, Popsicles, Ice Chips. Her pain is well controlled on current medications. She is out of bed to chair      has no past medical history on file. CURRENT MEDS  Scheduled Meds:   albumin human  25 g IntraVENous Q8H    pantoprazole (PROTONIX) 40 mg injection  40 mg IntraVENous Daily    sodium chloride flush  5-40 mL IntraVENous 2 times per day    sodium chloride flush  5-40 mL IntraVENous 2 times per day    piperacillin-tazobactam  3,375 mg IntraVENous Q8H    sodium chloride flush  5-40 mL IntraVENous 2 times per day     Continuous Infusions:   sodium chloride      fentanyl epidural builder 6 mL/hr at 22 0500    sodium chloride      sodium chloride 125 mL/hr at 22 0916    sodium chloride       PRN Meds:.fentanNYL, sodium chloride, naloxone, sodium chloride flush, sodium chloride, fentanNYL, sodium chloride flush, sodium chloride, ondansetron **OR** ondansetron, sodium chloride flush, sodium chloride, ondansetron **OR** ondansetron, HYDROmorphone **OR** HYDROmorphone, nicotine, ketorolac    OBJECTIVE  CURRENT VITALS:  height is 5' 5\" (1.651 m) and weight is 148 lb 8 oz (67.4 kg). Her oral temperature is 99.6 °F (37.6 °C). Her blood pressure is 121/65 and her pulse is 105 (abnormal). Her respiration is 19 and oxygen saturation is 100%.    Temperature Range (24h):Temp: 99.6 °F (37.6 °C) Temp  Av.8 °F (37.1 °C) Min: 98.3 °F (36.8 °C)  Max: 99.6 °F (37.6 °C)  BP Range (37Y): Systolic (47ADJ), JII:630 , Min:87 , OLP:906     Diastolic (94ATC), OHO:90, Min:47, Max:68    Pulse Range (24h): Pulse  Av.7  Min: 81  Max: 112  Respiration Range (24h): Resp  Av.4  Min: 12  Max: 28    GENERAL: alert, no distress  LUNGS: clear to ausculation, without wheezes, rales or rhonci  HEART: normal rate and regular rhythm  ABDOMEN: non-distended, HO serosanguineous, colostomy functioning, bowel sounds present in all 4 quadrants, and no guarding or peritoneal signs  EXTERMITY: no cyanosis, clubbing or edema  In: 3960.1 [P.O.:50; I.V.:2887.2]  Out: 2945 [Urine:2300; Drains:195]  Date 22 0000 - 22 2359   Shift 1228-7758 1532-7284 9609-4395 24 Hour Total   INTAKE   I.V.(mL/kg) 1610.8(23.9)   1610.8(23.9)   IV Piggyback(mL/kg) 173.8(2.6)   173.8(2.6)   Shift Total(mL/kg) 1784. 6(26.5)   1784. 6(26.5)   OUTPUT   Shift Total(mL/kg)       Weight (kg) 67.4 67.4 67.4 67.4       LABS  Recent Labs     22  0542 22  0447 22  0448 22  0422   WBC 7.6  --  12.1* 12.3*   HGB 13.6  --  11.0* 8.7*   HCT 40.4  --  33.0* 25.2*     --  171 136    139  --  137   K 4.1 4.0  --  3.6    104  --  106   CO2 24 27  --  23   BUN 13 10  --  5*   CREATININE 0.72 0.61  --  0.52   PHOS 3.4 3.8  --  1.9*   CALCIUM 9.0 7.4*  --  8.4*      No results for input(s): PTT, INR in the last 72 hours. Invalid input(s): PT  Recent Labs     22  1451 22  0447   AST 8 10   ALT 9 6   BILITOT 0.4 0.7       RADIOLOGY  CT ABDOMEN PELVIS W IV CONTRAST Additional Contrast? None    Result Date: 2022  CT ABDOMEN PELVIS W IV CONTRAST HISTORY:   Diffuse abdominal pain, worse in the left lower quadrant. Nausea. Constipation. Bloating. History of diverticulitis. TECHNIQUE: CT of the abdomen and pelvis was performed using standard technique, scanning from just above the dome of the diaphragm to the symphysis pubis.  Sagittal and coronal performed. Contrast: IV: 50 ml Isovue 300 Oral:  None. All CT scans at this facility use dose modulation, iterative reconstruction, and/or weight based dosing when appropriate to reduce radiation dose to as low as reasonably achievable. COMPARISON: CT 11/9/2016. RESULT: Liver: No mass or lesion. Biliary: Gallbladder unremarkable. No bile duct dilation. Pancreas: No mass or duct dilation. Spleen: No mass or splenomegaly. Adrenals: No mass. Kidneys: No calculus or hydronephrosis. No suspicious renal lesions. GI tract: Extensive feces throughout the colon. Focal area of masslike wall thickening (\"apple core lesion\") within the distal sigmoid colon within the pelvis suspicious for obstructing neoplasm, with associated distention of the upstream sigmoid, descending, transverse, and ascending colon. Small bowel normal caliber. Moderate hiatal hernia. Diverticulosis without evidence for acute diverticulitis. Lymph nodes: No abdominal or pelvic lymphadenopathy. Mesentery/Peritoneum/Retroperitoneum: No ascites or mass. Vasculature: The celiac axis and SMA are patent. The portal vein and branches, splenic vein, SMV, and hepatic veins are patent. Mild arterial atherosclerotic disease without aneurysm. Pelvis: No significant free fluid. Uterus grossly unremarkable. Bladder decompressed. See above for colonic findings. Bones: No acute osseous findings. No destructive osseous lesions. Soft tissues: Unremarkable. Lower thorax: Unremarkable. Findings suspicious for obstructing colonic neoplasm in the distal sigmoid colon. Further evaluation with colonoscopy is suggested. ==========     XR CHEST PORTABLE    Result Date: 8/17/2022  EXAMINATION: XR CHEST PORTABLE CLINICAL HISTORY: 78year-old with epigastric pain radiating to the chest COMPARISONS: None available. FINDINGS: Portable frontal view of the chest was obtained at 13:12 hours. Cardiac and mediastinal contours are within normal limits.  Pulmonary vascularity is not distended. No large airspace consolidations, pleural effusions or pneumothoraces. Visualized  bones are intact.      NO RADIOGRAPHIC FINDINGS OF ACUTE CARDIOPULMONARY DISEASE ON PORTABLE PLAIN FILM    Electronically signed by Lenny Hayden MD on 8/27/2022 at 9:25 AM

## 2022-08-28 LAB
ALBUMIN SERPL-MCNC: 2.8 G/DL (ref 3.5–4.6)
ANION GAP SERPL CALCULATED.3IONS-SCNC: 6 MEQ/L (ref 9–15)
BASOPHILS ABSOLUTE: 0 K/UL (ref 0–0.2)
BASOPHILS RELATIVE PERCENT: 0.3 %
BUN BLDV-MCNC: 7 MG/DL (ref 6–20)
CALCIUM SERPL-MCNC: 8.5 MG/DL (ref 8.5–9.9)
CHLORIDE BLD-SCNC: 105 MEQ/L (ref 95–107)
CO2: 27 MEQ/L (ref 20–31)
CREAT SERPL-MCNC: 0.49 MG/DL (ref 0.5–0.9)
EOSINOPHILS ABSOLUTE: 0.3 K/UL (ref 0–0.7)
EOSINOPHILS RELATIVE PERCENT: 2.9 %
GFR AFRICAN AMERICAN: >60
GFR NON-AFRICAN AMERICAN: >60
GLUCOSE BLD-MCNC: 118 MG/DL (ref 70–99)
HCT VFR BLD CALC: 24.8 % (ref 37–47)
HEMOGLOBIN: 8.4 G/DL (ref 12–16)
LYMPHOCYTES ABSOLUTE: 1.3 K/UL (ref 1–4.8)
LYMPHOCYTES RELATIVE PERCENT: 11.8 %
MCH RBC QN AUTO: 29.2 PG (ref 27–31.3)
MCHC RBC AUTO-ENTMCNC: 33.7 % (ref 33–37)
MCV RBC AUTO: 86.5 FL (ref 82–100)
MONOCYTES ABSOLUTE: 0.5 K/UL (ref 0.2–0.8)
MONOCYTES RELATIVE PERCENT: 4.3 %
NEUTROPHILS ABSOLUTE: 9.1 K/UL (ref 1.4–6.5)
NEUTROPHILS RELATIVE PERCENT: 80.7 %
PDW BLD-RTO: 15 % (ref 11.5–14.5)
PHOSPHORUS: 1.8 MG/DL (ref 2.3–4.8)
PLATELET # BLD: 151 K/UL (ref 130–400)
POTASSIUM SERPL-SCNC: 2.9 MEQ/L (ref 3.4–4.9)
RBC # BLD: 2.87 M/UL (ref 4.2–5.4)
SODIUM BLD-SCNC: 138 MEQ/L (ref 135–144)
WBC # BLD: 11.2 K/UL (ref 4.8–10.8)

## 2022-08-28 PROCEDURE — 2580000003 HC RX 258: Performed by: COLON & RECTAL SURGERY

## 2022-08-28 PROCEDURE — 99024 POSTOP FOLLOW-UP VISIT: CPT | Performed by: COLON & RECTAL SURGERY

## 2022-08-28 PROCEDURE — 99232 SBSQ HOSP IP/OBS MODERATE 35: CPT | Performed by: INTERNAL MEDICINE

## 2022-08-28 PROCEDURE — 1210000000 HC MED SURG R&B

## 2022-08-28 PROCEDURE — 6360000002 HC RX W HCPCS: Performed by: COLON & RECTAL SURGERY

## 2022-08-28 PROCEDURE — 2580000003 HC RX 258: Performed by: NURSE PRACTITIONER

## 2022-08-28 PROCEDURE — 6360000002 HC RX W HCPCS: Performed by: INTERNAL MEDICINE

## 2022-08-28 PROCEDURE — 36415 COLL VENOUS BLD VENIPUNCTURE: CPT

## 2022-08-28 PROCEDURE — 2580000003 HC RX 258: Performed by: STUDENT IN AN ORGANIZED HEALTH CARE EDUCATION/TRAINING PROGRAM

## 2022-08-28 PROCEDURE — 6360000002 HC RX W HCPCS: Performed by: STUDENT IN AN ORGANIZED HEALTH CARE EDUCATION/TRAINING PROGRAM

## 2022-08-28 PROCEDURE — 80069 RENAL FUNCTION PANEL: CPT

## 2022-08-28 PROCEDURE — 99231 SBSQ HOSP IP/OBS SF/LOW 25: CPT | Performed by: UROLOGY

## 2022-08-28 PROCEDURE — 85025 COMPLETE CBC W/AUTO DIFF WBC: CPT

## 2022-08-28 PROCEDURE — C9113 INJ PANTOPRAZOLE SODIUM, VIA: HCPCS | Performed by: COLON & RECTAL SURGERY

## 2022-08-28 RX ORDER — POTASSIUM CHLORIDE 7.45 MG/ML
10 INJECTION INTRAVENOUS
Status: DISCONTINUED | OUTPATIENT
Start: 2022-08-28 | End: 2022-08-28

## 2022-08-28 RX ORDER — POTASSIUM CHLORIDE 7.45 MG/ML
10 INJECTION INTRAVENOUS
Status: DISPENSED | OUTPATIENT
Start: 2022-08-28 | End: 2022-08-28

## 2022-08-28 RX ORDER — KETOROLAC TROMETHAMINE 30 MG/ML
30 INJECTION, SOLUTION INTRAMUSCULAR; INTRAVENOUS EVERY 6 HOURS PRN
Status: DISCONTINUED | OUTPATIENT
Start: 2022-08-28 | End: 2022-08-31 | Stop reason: HOSPADM

## 2022-08-28 RX ADMIN — POTASSIUM CHLORIDE 10 MEQ: 7.46 INJECTION, SOLUTION INTRAVENOUS at 08:43

## 2022-08-28 RX ADMIN — KETOROLAC TROMETHAMINE 30 MG: 30 INJECTION, SOLUTION INTRAMUSCULAR; INTRAVENOUS at 18:05

## 2022-08-28 RX ADMIN — POTASSIUM CHLORIDE 10 MEQ: 7.46 INJECTION, SOLUTION INTRAVENOUS at 06:45

## 2022-08-28 RX ADMIN — SODIUM CHLORIDE, PRESERVATIVE FREE 40 MG: 5 INJECTION INTRAVENOUS at 08:03

## 2022-08-28 RX ADMIN — ROPIVACAINE HYDROCHLORIDE: 5 INJECTION EPIDURAL; INFILTRATION; PERINEURAL at 09:48

## 2022-08-28 RX ADMIN — PIPERACILLIN AND TAZOBACTAM 3375 MG: 3; .375 INJECTION, POWDER, LYOPHILIZED, FOR SOLUTION INTRAVENOUS at 11:32

## 2022-08-28 RX ADMIN — POTASSIUM CHLORIDE 10 MEQ: 10 INJECTION, SOLUTION INTRAVENOUS at 12:58

## 2022-08-28 RX ADMIN — SODIUM CHLORIDE, SODIUM LACTATE, POTASSIUM CHLORIDE, CALCIUM CHLORIDE AND DEXTROSE MONOHYDRATE: 5; 600; 310; 30; 20 INJECTION, SOLUTION INTRAVENOUS at 05:41

## 2022-08-28 RX ADMIN — SODIUM CHLORIDE, SODIUM LACTATE, POTASSIUM CHLORIDE, CALCIUM CHLORIDE AND DEXTROSE MONOHYDRATE: 5; 600; 310; 30; 20 INJECTION, SOLUTION INTRAVENOUS at 22:54

## 2022-08-28 RX ADMIN — POTASSIUM CHLORIDE 10 MEQ: 10 INJECTION, SOLUTION INTRAVENOUS at 18:54

## 2022-08-28 RX ADMIN — PIPERACILLIN AND TAZOBACTAM 3375 MG: 3; .375 INJECTION, POWDER, LYOPHILIZED, FOR SOLUTION INTRAVENOUS at 02:07

## 2022-08-28 RX ADMIN — POTASSIUM CHLORIDE 10 MEQ: 10 INJECTION, SOLUTION INTRAVENOUS at 21:14

## 2022-08-28 ASSESSMENT — PAIN SCALES - GENERAL
PAINLEVEL_OUTOF10: 6
PAINLEVEL_OUTOF10: 1

## 2022-08-28 ASSESSMENT — PAIN DESCRIPTION - DESCRIPTORS
DESCRIPTORS: SORE
DESCRIPTORS: ACHING

## 2022-08-28 ASSESSMENT — PAIN DESCRIPTION - LOCATION
LOCATION: ABDOMEN
LOCATION: ABDOMEN

## 2022-08-28 ASSESSMENT — PAIN DESCRIPTION - ORIENTATION
ORIENTATION: MID
ORIENTATION: RIGHT;LEFT

## 2022-08-28 NOTE — PROGRESS NOTES
Shift assessment completed and medications given per MAR. Patient is alert and oriented x's 4. Patient is able to make needs known and there are no additional needs known at this time. Patient stated pain is about a 1 while laying in bed. Goes up while in the recliner in her room. Patient had complaint of nausea and PRN medication was given. Patient is up in chair visiting with family. Call light within reach and chair alarm on. Will continue to monitor.

## 2022-08-28 NOTE — ANESTHESIA POST-OP
Post op visit to evaluate epidural. Patient in bed with family visiting. Comfortable, noted discomfort with activity. Patient was informed epidural will posscibly be discontinued today. Site dry and intact, moving lower extremities without difficulty.

## 2022-08-28 NOTE — PROGRESS NOTES
Progress Note    8/28/2022   10:55 AM    Name:  Ed See  MRN:    [id number]     Acct:     [de-identified]   Room:  [facility] Day: 4     Admit Date: 8/24/2022  1:48 PM  PCP: Giuseppe Rojas MD    Subjective:     C/C: No chief complaint on file. Interval History: Status: This is a 62 y/o s/p Rectosigmoid resection and Lt ureteral injury repair with cystoscopy and stent now POD # 3. The urine is clear. She has no pain. I discussed the plan wrt the stent and future management and reviewed what to expect from the stent once she returns to normal voiding. Her  was also present. History reviewed. No pertinent past medical history. ROS:  Review of Systems   Genitourinary:  Negative for flank pain and hematuria. Medications: Allergies:    Allergies   Allergen Reactions    Codeine Nausea And Vomiting    Demerol Hcl [Meperidine] Nausea Only    Dilaudid [Hydromorphone] Nausea Only       Current Meds: ketorolac (TORADOL) injection 30 mg, Q6H PRN  potassium chloride 10 mEq/100 mL IVPB (Peripheral Line), Q2H  dextrose 5 % in lactated ringers infusion, Continuous  0.9 % sodium chloride bolus, BID PRN  pantoprazole (PROTONIX) 40 mg in sodium chloride (PF) 10 mL injection, Daily  naloxone 0.4 mg in 10 mL sodium chloride syringe, PRN  sodium chloride flush 0.9 % injection 5-40 mL, 2 times per day  sodium chloride flush 0.9 % injection 5-40 mL, PRN  0.9 % sodium chloride infusion, PRN  fentaNYL (SUBLIMAZE) 2 mcg/mL, ropivacaine (NAROPIN) 0.2 % in sodium chloride 0.9 % 100 mL epidural, Continuous  sodium chloride flush 0.9 % injection 5-40 mL, 2 times per day  sodium chloride flush 0.9 % injection 5-40 mL, PRN  0.9 % sodium chloride infusion, PRN  ondansetron (ZOFRAN-ODT) disintegrating tablet 4 mg, Q8H PRN   Or  ondansetron (ZOFRAN) injection 4 mg, Q6H PRN  piperacillin-tazobactam (ZOSYN) 3,375 mg in dextrose 5 % 50 mL IVPB extended infusion (mini-bag), Q8H  sodium chloride flush 0.9 % injection 5-40 mL, 2 times per day  sodium chloride flush 0.9 % injection 5-40 mL, PRN  0.9 % sodium chloride infusion, PRN  ondansetron (ZOFRAN-ODT) disintegrating tablet 4 mg, Q8H PRN   Or  ondansetron (ZOFRAN) injection 4 mg, Q6H PRN  HYDROmorphone (DILAUDID) injection 0.5 mg, Q3H PRN   Or  HYDROmorphone (DILAUDID) injection 1 mg, Q3H PRN  nicotine (NICODERM CQ) 21 MG/24HR 1 patch, Daily PRN        Data:     Code Status:  Full Code    History reviewed. No pertinent family history. Social History     Socioeconomic History    Marital status:      Spouse name: Not on file    Number of children: Not on file    Years of education: Not on file    Highest education level: Not on file   Occupational History    Not on file   Tobacco Use    Smoking status: Every Day     Types: Cigarettes    Smokeless tobacco: Current   Vaping Use    Vaping Use: Never used   Substance and Sexual Activity    Alcohol use: Not Currently    Drug use: Never    Sexual activity: Not Currently   Other Topics Concern    Not on file   Social History Narrative    Not on file     Social Determinants of Health     Financial Resource Strain: Not on file   Food Insecurity: Not on file   Transportation Needs: Not on file   Physical Activity: Not on file   Stress: Not on file   Social Connections: Not on file   Intimate Partner Violence: Not on file   Housing Stability: Not on file       Vitals:  /65   Pulse 91   Temp 99 °F (37.2 °C) (Oral)   Resp 18   Ht 5' 5\" (1.651 m)   Wt 148 lb 8 oz (67.4 kg)   LMP  (LMP Unknown)   SpO2 96%   BMI 24.71 kg/m²   Temp (24hrs), Av.7 °F (37.1 °C), Min:98.4 °F (36.9 °C), Max:99 °F (37.2 °C)    No results for input(s): POCGLU in the last 72 hours. I/O(24Hr):     Intake/Output Summary (Last 24 hours) at 2022 1055  Last data filed at 2022 0534  Gross per 24 hour   Intake 2143.77 ml   Output 2475 ml   Net -331.23 ml       Labs:  Hematology:  Recent Labs     22  0457   WBC 11.2*   HGB 8.4*   HCT 24.8*        Chemistry:  Recent Labs     08/28/22  0457      K 2.9*      CO2 27   GLUCOSE 118*   BUN 7   CREATININE 0.49*   ANIONGAP 6*   LABGLOM >60.0   GFRAA >60.0   CALCIUM 8.5   PHOS 1.8*       Urine CultureNo results found for: LABURIN      Physical Examination:        Physical Exam  Neurological:      Mental Status: She is alert. Psychiatric:         Mood and Affect: Mood normal.       Assessment:        Primary Problem    Intestinal obstruction [facility])     Active Hospital Problems    Diagnosis Date Noted    Diverticular disease [K57.90] 08/25/2022     Priority: Medium    Ureteral transection of left native kidney, initial encounter [S37.10XA] 08/25/2022     Priority: Medium    Intestinal obstruction [facility]) [K56.609] 08/24/2022     Priority: Medium     This is a 60 y/o s/p Rectosigmoid resection and Lt ureteral injury repair with cystoscopy and stent now POD # 3. The urine is clear.      Plan:        Continue present management   My office will call to arrange F/U in 4 weeks to discuss timing of cystoscopy and Lt retrograde with possible stent removal      Electronically signed by Sharon Olsen MD on 8/28/2022 at 10:55 AM

## 2022-08-28 NOTE — PROGRESS NOTES
Pt Name: Boone Benito  Medical Record Number: [id number]  Date of Birth 1965   Admit date 2022  1:48 PM  Today's Date: 2022     ASSESSMENT  1. Hospital day # 4  2 postop day #3 sigmoid resection with colostomy    PLAN  1. Advance diet  2. Out of bed  3. Place potassium  4. Ostomy education  5. Keep HO drain  6. Keep Rothman catheter    SUBJECTIVE  Chief complaint: Follow-up sigmoid resection  Afebrile, vital signs are stable. She denies any nausea or vomiting, colostomy working. She is tolerating a Diet NPO Exceptions are: Sips of Clear Liquids, Popsicles, Ice Chips. Her pain is well controlled on current medications. She has been ambulating in the halls. has no past medical history on file. CURRENT MEDS  Scheduled Meds:   potassium chloride  10 mEq IntraVENous Q2H    pantoprazole (PROTONIX) 40 mg injection  40 mg IntraVENous Daily    sodium chloride flush  5-40 mL IntraVENous 2 times per day    sodium chloride flush  5-40 mL IntraVENous 2 times per day    piperacillin-tazobactam  3,375 mg IntraVENous Q8H    sodium chloride flush  5-40 mL IntraVENous 2 times per day     Continuous Infusions:   dextrose 5% in lactated ringers 60 mL/hr at 22 0541    sodium chloride      fentanyl epidural builder 6 mL/hr at 22 0948    sodium chloride      sodium chloride       PRN Meds:.ketorolac, fentanNYL, sodium chloride, naloxone, sodium chloride flush, sodium chloride, fentanNYL, sodium chloride flush, sodium chloride, ondansetron **OR** ondansetron, sodium chloride flush, sodium chloride, ondansetron **OR** ondansetron, HYDROmorphone **OR** HYDROmorphone, nicotine    OBJECTIVE  CURRENT VITALS:  height is 5' 5\" (1.651 m) and weight is 148 lb 8 oz (67.4 kg). Her oral temperature is 99 °F (37.2 °C). Her blood pressure is 135/65 and her pulse is 91. Her respiration is 18 and oxygen saturation is 96%.    Temperature Range (24h):Temp: 99 °F (37.2 °C) Temp  Av.7 °F (37.1 °C)  Min: 98.4 °F (36.9 °C)  Max: 99 °F (37.2 °C)  BP Range (03B): Systolic (48LIJ), GRH:561 , Min:109 , TUZ:262     Diastolic (58XHM), YBM:93, Min:50, Max:69    Pulse Range (24h): Pulse  Av.4  Min: 89  Max: 103  Respiration Range (24h): Resp  Av.2  Min: 17  Max: 20    GENERAL: alert, no distress  LUNGS: clear to ausculation, without wheezes, rales or rhonci  HEART: normal rate and regular rhythm  ABDOMEN: soft, non-tender, non-distended, incision looks fine. Brandon replaced, colostomy working, bowel sounds present in all 4 quadrants, and no guarding or peritoneal signs  EXTERMITY: no cyanosis, clubbing or edema  In: 3231.8 [P.O.:60; I.V.:2691.6]  Out: 2475 [Urine:2250; Drains:175]  Date 22 0000 - 22 2359   Shift 1877-4670 1304-5092 7988-2716 24 Hour Total   INTAKE   P.O.(mL/kg/hr) 60(0.1)   60   I. V.(mL/kg) 677(10.1)   677(10.1)   IV Piggyback(mL/kg) 156.3(2.3)   156.3(2.3)   Shift Total(mL/kg) 893. 3(13.3)   893. 3(13.3)   OUTPUT   Urine(mL/kg/hr) 1000(1.9)   1000   Drains(mL/kg) 55(0.8)   55(0.8)   Shift Total(mL/kg) 1055(15.7)   1055(15.7)   Weight (kg) 67.4 67.4 67.4 67.4       LABS  Recent Labs     22  04422   WBC  --  12.1* 12.3* 11.2*   HGB  --  11.0* 8.7* 8.4*   HCT  --  33.0* 25.2* 24.8*   PLT  --  171 136 151     --  137 138   K 4.0  --  3.6 2.9*     --  106 105   CO2 27  --  23 27   BUN 10  --  5* 7   CREATININE 0.61  --  0.52 0.49*   PHOS 3.8  --  1.9* 1.8*   CALCIUM 7.4*  --  8.4* 8.5      No results for input(s): PTT, INR in the last 72 hours. Invalid input(s): PT  Recent Labs     22  0447   AST 10   ALT 6   BILITOT 0.7       RADIOLOGY  CT ABDOMEN PELVIS W IV CONTRAST Additional Contrast? None    Result Date: 2022  CT ABDOMEN PELVIS W IV CONTRAST HISTORY:   Diffuse abdominal pain, worse in the left lower quadrant. Nausea. Constipation. Bloating. History of diverticulitis.  TECHNIQUE: CT of the abdomen and pelvis was performed using standard technique, scanning from just above the dome of the diaphragm to the symphysis pubis. Sagittal and coronal performed. Contrast: IV: 50 ml Isovue 300 Oral:  None. All CT scans at this facility use dose modulation, iterative reconstruction, and/or weight based dosing when appropriate to reduce radiation dose to as low as reasonably achievable. COMPARISON: CT 11/9/2016. RESULT: Liver: No mass or lesion. Biliary: Gallbladder unremarkable. No bile duct dilation. Pancreas: No mass or duct dilation. Spleen: No mass or splenomegaly. Adrenals: No mass. Kidneys: No calculus or hydronephrosis. No suspicious renal lesions. GI tract: Extensive feces throughout the colon. Focal area of masslike wall thickening (\"apple core lesion\") within the distal sigmoid colon within the pelvis suspicious for obstructing neoplasm, with associated distention of the upstream sigmoid, descending, transverse, and ascending colon. Small bowel normal caliber. Moderate hiatal hernia. Diverticulosis without evidence for acute diverticulitis. Lymph nodes: No abdominal or pelvic lymphadenopathy. Mesentery/Peritoneum/Retroperitoneum: No ascites or mass. Vasculature: The celiac axis and SMA are patent. The portal vein and branches, splenic vein, SMV, and hepatic veins are patent. Mild arterial atherosclerotic disease without aneurysm. Pelvis: No significant free fluid. Uterus grossly unremarkable. Bladder decompressed. See above for colonic findings. Bones: No acute osseous findings. No destructive osseous lesions. Soft tissues: Unremarkable. Lower thorax: Unremarkable. Findings suspicious for obstructing colonic neoplasm in the distal sigmoid colon. Further evaluation with colonoscopy is suggested. ==========     XR CHEST PORTABLE    Result Date: 8/17/2022  EXAMINATION: XR CHEST PORTABLE CLINICAL HISTORY: 78year-old with epigastric pain radiating to the chest COMPARISONS: None available.  FINDINGS: Portable frontal view of the chest was

## 2022-08-28 NOTE — PROGRESS NOTES
Assessment documented. A + O x 3. Surgical incision is well approximated with staples, some old drainage noted to abdominal pad. Colostomy stoma is moist and beefy red, producing brown liquid stool. HO compressed with sanguinous exudate inside bulb. Pain is well controlled with epidural. Rothman to CD draining kirk color urine. No other  needs offered at this time. /66   Pulse 89   Temp 98.6 °F (37 °C) (Oral)   Resp 17   Ht 5' 5\" (1.651 m)   Wt 148 lb 8 oz (67.4 kg)   LMP  (LMP Unknown)   SpO2 95%   BMI 24.71 kg/m²       0611 - Dr. Guajardo Call notified of:    Latest Reference Range & Units 8/28/22 04:57   Potassium 3.4 - 4.9 mEq/L 2.9 (LL)   (LL): Data is critically low    Awaiting orders.

## 2022-08-28 NOTE — PROGRESS NOTES
Hospitalist Progress Note      PCP: Juan Diego Patel MD    Date of Admission: 8/24/2022    Chief Complaint:  no acute events, afebrile, stable HD    Medications:  Reviewed    Infusion Medications    dextrose 5% in lactated ringers 60 mL/hr at 08/28/22 0541    sodium chloride      fentanyl epidural builder 6 mL/hr at 08/28/22 0948    sodium chloride      sodium chloride       Scheduled Medications    potassium chloride  10 mEq IntraVENous Q2H    sodium phosphate IVPB  20 mmol IntraVENous Once    pantoprazole (PROTONIX) 40 mg injection  40 mg IntraVENous Daily    sodium chloride flush  5-40 mL IntraVENous 2 times per day    sodium chloride flush  5-40 mL IntraVENous 2 times per day    piperacillin-tazobactam  3,375 mg IntraVENous Q8H    sodium chloride flush  5-40 mL IntraVENous 2 times per day     PRN Meds: ketorolac, sodium chloride, naloxone, sodium chloride flush, sodium chloride, sodium chloride flush, sodium chloride, ondansetron **OR** ondansetron, sodium chloride flush, sodium chloride, ondansetron **OR** ondansetron, HYDROmorphone **OR** HYDROmorphone, nicotine      Intake/Output Summary (Last 24 hours) at 8/28/2022 1159  Last data filed at 8/28/2022 0534  Gross per 24 hour   Intake 2143.77 ml   Output 2475 ml   Net -331.23 ml         Exam:    /65   Pulse 91   Temp 99 °F (37.2 °C) (Oral)   Resp 18   Ht 5' 5\" (1.651 m)   Wt 148 lb 8 oz (67.4 kg)   LMP  (LMP Unknown)   SpO2 96%   BMI 24.71 kg/m²     General appearance: appears stated age and cooperative. Respiratory:  clear to auscultation, bilaterally  Cardiovascular: Regular rate and rhythm, S1/S2 . Abdomen: colostomy bag present, active bowel sounds. Musculoskeletal: No edema bilaterally.       Labs:   Recent Labs     08/26/22 0448 08/27/22 0422 08/28/22 0457   WBC 12.1* 12.3* 11.2*   HGB 11.0* 8.7* 8.4*   HCT 33.0* 25.2* 24.8*    136 151       Recent Labs     08/26/22 0447 08/27/22 0422 08/28/22 0457    137 138   K 4.0 3.6 2.9*    106 105   CO2 27 23 27   BUN 10 5* 7   CREATININE 0.61 0.52 0.49*   CALCIUM 7.4* 8.4* 8.5   PHOS 3.8 1.9* 1.8*       Recent Labs     08/26/22  0447   AST 10   ALT 6   BILITOT 0.7   ALKPHOS 55       No results for input(s): INR in the last 72 hours. No results for input(s): Patrisha Meigs in the last 72 hours. 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

## 2022-08-28 NOTE — CARE COORDINATION
MET WITH PATIENT, FREEDOM OF CHOICE OFFERED. PLAN IS HOME WITH [facility]. WILL NEED ORDER. KAYLA FOR OSTOMY EDUCATION ON Monday. LSW/CM TO FOLLOW.

## 2022-08-28 NOTE — PROGRESS NOTES
INPATIENT PROGRESS NOTES    PATIENT NAME: Sean Holt  MRN: [id number]  SERVICE DATE:  August 28, 2022   SERVICE TIME:  12:23 PM      PRIMARY SERVICE: Pulmonary Disease    CHIEF COMPLAINTS: Volume overload    INTERVAL HPI: Patient seen and examined at bedside, Interval Notes, orders reviewed. Nursing notes noted    Patient report patient doing good, no chest pain, no shortness of breath, she is currently on room air saturation 96%, no fever    Review of system:     GI Abdominal pain No  Skin Rash No    Social History     Tobacco Use    Smoking status: Every Day     Types: Cigarettes    Smokeless tobacco: Current   Substance Use Topics    Alcohol use: Not Currently     History reviewed. No pertinent family history. OBJECTIVE    Body mass index is 24.71 kg/m². PHYSICAL EXAM:  Vitals:  /65   Pulse 91   Temp 99 °F (37.2 °C) (Oral)   Resp 18   Ht 5' 5\" (1.651 m)   Wt 148 lb 8 oz (67.4 kg)   LMP  (LMP Unknown)   SpO2 96%   BMI 24.71 kg/m²     General: alert, cooperative, no distress  Head: normocephalic, atraumatic  Eyes:No gross abnormalities. ENT:  MMM no lesions  Neck:  supple and no masses  Chest : clear to auscultation bilaterally- no wheezes, rales or rhonchi, normal air movement, no respiratory distress  Heart[de-identified] Heart sounds are normal.  Regular rate and rhythm without murmur, gallop or rub. ABD:  symmetric, soft, non-tender  Musculoskeletal : no cyanosis, no clubbing, and no edema  Neuro:  Grossly normal  Skin: No rashes or nodules noted.   Lymph node:  no cervical nodes  Urology: No Rothman   Psychiatric: appropriate    DATA:   Recent Labs     08/27/22 0422 08/28/22 0457   WBC 12.3* 11.2*   HGB 8.7* 8.4*   HCT 25.2* 24.8*   MCV 85.1 86.5    151     Recent Labs     08/26/22 0447 08/27/22 0422 08/28/22 0457    137 138   K 4.0 3.6 2.9*    106 105   CO2 27 23 27   BUN 10 5* 7   CREATININE 0.61 0.52 0.49*   GLUCOSE 98 80 118*   CALCIUM 7.4* 8.4* 8.5   PROT 4.3*  --   -- LABALBU 2.6*  --  2.8*   BILITOT 0.7  --   --    ALKPHOS 55  --   --    AST 10  --   --    ALT 6  --   --    LABGLOM >60.0 >60.0 >60.0   GFRAA >60.0 >60.0 >60.0   GLOB 1.7*  --   --        MV Settings:          No results for input(s): PHART, DZI1JWZ, PO2ART, HAG7IGK, BEART, F4GFMYLB in the last 72 hours. O2 Device: None (Room air)  O2 Flow Rate (L/min): 8 L/min    ADULT DIET; Regular; Low Fiber     MEDICATIONS during current hospitalization:    Continuous Infusions:   dextrose 5% in lactated ringers 60 mL/hr at 08/28/22 0541    sodium chloride      fentanyl epidural builder 6 mL/hr at 08/28/22 0948    sodium chloride      sodium chloride         Scheduled Meds:   potassium chloride  10 mEq IntraVENous Q2H    sodium phosphate IVPB  20 mmol IntraVENous Once    pantoprazole (PROTONIX) 40 mg injection  40 mg IntraVENous Daily    sodium chloride flush  5-40 mL IntraVENous 2 times per day    sodium chloride flush  5-40 mL IntraVENous 2 times per day    piperacillin-tazobactam  3,375 mg IntraVENous Q8H    sodium chloride flush  5-40 mL IntraVENous 2 times per day       PRN Meds:ketorolac, sodium chloride, naloxone, sodium chloride flush, sodium chloride, sodium chloride flush, sodium chloride, ondansetron **OR** ondansetron, sodium chloride flush, sodium chloride, ondansetron **OR** ondansetron, HYDROmorphone **OR** HYDROmorphone, nicotine    Radiology  CT ABDOMEN PELVIS W IV CONTRAST Additional Contrast? None    Result Date: 8/17/2022  CT ABDOMEN PELVIS W IV CONTRAST HISTORY:   Diffuse abdominal pain, worse in the left lower quadrant. Nausea. Constipation. Bloating. History of diverticulitis. TECHNIQUE: CT of the abdomen and pelvis was performed using standard technique, scanning from just above the dome of the diaphragm to the symphysis pubis. Sagittal and coronal performed. Contrast: IV: 50 ml Isovue 300 Oral:  None.  All CT scans at this facility use dose modulation, iterative reconstruction, and/or weight based dosing when appropriate to reduce radiation dose to as low as reasonably achievable. COMPARISON: CT 11/9/2016. RESULT: Liver: No mass or lesion. Biliary: Gallbladder unremarkable. No bile duct dilation. Pancreas: No mass or duct dilation. Spleen: No mass or splenomegaly. Adrenals: No mass. Kidneys: No calculus or hydronephrosis. No suspicious renal lesions. GI tract: Extensive feces throughout the colon. Focal area of masslike wall thickening (\"apple core lesion\") within the distal sigmoid colon within the pelvis suspicious for obstructing neoplasm, with associated distention of the upstream sigmoid, descending, transverse, and ascending colon. Small bowel normal caliber. Moderate hiatal hernia. Diverticulosis without evidence for acute diverticulitis. Lymph nodes: No abdominal or pelvic lymphadenopathy. Mesentery/Peritoneum/Retroperitoneum: No ascites or mass. Vasculature: The celiac axis and SMA are patent. The portal vein and branches, splenic vein, SMV, and hepatic veins are patent. Mild arterial atherosclerotic disease without aneurysm. Pelvis: No significant free fluid. Uterus grossly unremarkable. Bladder decompressed. See above for colonic findings. Bones: No acute osseous findings. No destructive osseous lesions. Soft tissues: Unremarkable. Lower thorax: Unremarkable. Findings suspicious for obstructing colonic neoplasm in the distal sigmoid colon. Further evaluation with colonoscopy is suggested. ==========     XR CHEST PORTABLE    Result Date: 8/17/2022  EXAMINATION: XR CHEST PORTABLE CLINICAL HISTORY: 78year-old with epigastric pain radiating to the chest COMPARISONS: None available. FINDINGS: Portable frontal view of the chest was obtained at 13:12 hours. Cardiac and mediastinal contours are within normal limits. Pulmonary vascularity is not distended. No large airspace consolidations, pleural effusions or pneumothoraces. Visualized  bones are intact.      NO RADIOGRAPHIC FINDINGS OF ACUTE CARDIOPULMONARY DISEASE ON PORTABLE PLAIN FILM            IMPRESSION AND SUGGESTION:  Patient is at risk due to   S/p sigmoidectomy, postop day #1  Acute blood loss anemia  Volume overload, resolved    Recommendation  Monitor and replace electrolyte  Continue incentive spirometry  Watch volume status and maintain euvolemic    Pulmonary will sign off, please call if any question or concern       Electronically signed by Rosalia Strong MD,  [facility]   on 8/28/2022 at 12:23 PM

## 2022-08-29 LAB
ALBUMIN SERPL-MCNC: 2.8 G/DL (ref 3.5–4.6)
ANION GAP SERPL CALCULATED.3IONS-SCNC: 11 MEQ/L (ref 9–15)
BASOPHILS ABSOLUTE: 0 K/UL (ref 0–0.2)
BASOPHILS RELATIVE PERCENT: 0.4 %
BUN BLDV-MCNC: 5 MG/DL (ref 6–20)
CALCIUM SERPL-MCNC: 8.6 MG/DL (ref 8.5–9.9)
CHLORIDE BLD-SCNC: 108 MEQ/L (ref 95–107)
CO2: 24 MEQ/L (ref 20–31)
CREAT SERPL-MCNC: 0.37 MG/DL (ref 0.5–0.9)
EOSINOPHILS ABSOLUTE: 0.4 K/UL (ref 0–0.7)
EOSINOPHILS RELATIVE PERCENT: 4.5 %
GFR AFRICAN AMERICAN: >60
GFR NON-AFRICAN AMERICAN: >60
GLUCOSE BLD-MCNC: 114 MG/DL (ref 70–99)
HCT VFR BLD CALC: 27.4 % (ref 37–47)
HEMOGLOBIN: 9.2 G/DL (ref 12–16)
LYMPHOCYTES ABSOLUTE: 1.1 K/UL (ref 1–4.8)
LYMPHOCYTES RELATIVE PERCENT: 14 %
MAGNESIUM: 1.7 MG/DL (ref 1.7–2.4)
MCH RBC QN AUTO: 29.2 PG (ref 27–31.3)
MCHC RBC AUTO-ENTMCNC: 33.6 % (ref 33–37)
MCV RBC AUTO: 87 FL (ref 82–100)
MONOCYTES ABSOLUTE: 0.6 K/UL (ref 0.2–0.8)
MONOCYTES RELATIVE PERCENT: 7.4 %
NEUTROPHILS ABSOLUTE: 6 K/UL (ref 1.4–6.5)
NEUTROPHILS RELATIVE PERCENT: 73.7 %
PDW BLD-RTO: 14.8 % (ref 11.5–14.5)
PHOSPHORUS: 3.5 MG/DL (ref 2.3–4.8)
PLATELET # BLD: 211 K/UL (ref 130–400)
POTASSIUM SERPL-SCNC: 3.7 MEQ/L (ref 3.4–4.9)
RBC # BLD: 3.15 M/UL (ref 4.2–5.4)
SODIUM BLD-SCNC: 143 MEQ/L (ref 135–144)
WBC # BLD: 8.1 K/UL (ref 4.8–10.8)

## 2022-08-29 PROCEDURE — A4216 STERILE WATER/SALINE, 10 ML: HCPCS | Performed by: COLON & RECTAL SURGERY

## 2022-08-29 PROCEDURE — 36415 COLL VENOUS BLD VENIPUNCTURE: CPT

## 2022-08-29 PROCEDURE — 1210000000 HC MED SURG R&B

## 2022-08-29 PROCEDURE — 99213 OFFICE O/P EST LOW 20 MIN: CPT

## 2022-08-29 PROCEDURE — 6360000002 HC RX W HCPCS: Performed by: COLON & RECTAL SURGERY

## 2022-08-29 PROCEDURE — 99024 POSTOP FOLLOW-UP VISIT: CPT | Performed by: COLON & RECTAL SURGERY

## 2022-08-29 PROCEDURE — 80069 RENAL FUNCTION PANEL: CPT

## 2022-08-29 PROCEDURE — 2580000003 HC RX 258: Performed by: INTERNAL MEDICINE

## 2022-08-29 PROCEDURE — 2580000003 HC RX 258: Performed by: ANESTHESIOLOGY

## 2022-08-29 PROCEDURE — 85025 COMPLETE CBC W/AUTO DIFF WBC: CPT

## 2022-08-29 PROCEDURE — 2580000003 HC RX 258: Performed by: COLON & RECTAL SURGERY

## 2022-08-29 PROCEDURE — 83735 ASSAY OF MAGNESIUM: CPT

## 2022-08-29 PROCEDURE — 2500000003 HC RX 250 WO HCPCS: Performed by: INTERNAL MEDICINE

## 2022-08-29 PROCEDURE — C9113 INJ PANTOPRAZOLE SODIUM, VIA: HCPCS | Performed by: COLON & RECTAL SURGERY

## 2022-08-29 PROCEDURE — 6370000000 HC RX 637 (ALT 250 FOR IP): Performed by: COLON & RECTAL SURGERY

## 2022-08-29 PROCEDURE — 6370000000 HC RX 637 (ALT 250 FOR IP): Performed by: INTERNAL MEDICINE

## 2022-08-29 RX ORDER — HYDROXYZINE HYDROCHLORIDE 10 MG/1
10 TABLET, FILM COATED ORAL 3 TIMES DAILY PRN
Status: DISCONTINUED | OUTPATIENT
Start: 2022-08-29 | End: 2022-08-31 | Stop reason: HOSPADM

## 2022-08-29 RX ORDER — OXYCODONE HYDROCHLORIDE AND ACETAMINOPHEN 5; 325 MG/1; MG/1
2 TABLET ORAL EVERY 4 HOURS PRN
Status: DISCONTINUED | OUTPATIENT
Start: 2022-08-29 | End: 2022-08-31 | Stop reason: HOSPADM

## 2022-08-29 RX ORDER — MORPHINE SULFATE 2 MG/ML
1 INJECTION, SOLUTION INTRAMUSCULAR; INTRAVENOUS
Status: DISCONTINUED | OUTPATIENT
Start: 2022-08-29 | End: 2022-08-31 | Stop reason: HOSPADM

## 2022-08-29 RX ORDER — CALCIUM CARBONATE 200(500)MG
500 TABLET,CHEWABLE ORAL 3 TIMES DAILY PRN
Status: DISCONTINUED | OUTPATIENT
Start: 2022-08-29 | End: 2022-08-31 | Stop reason: HOSPADM

## 2022-08-29 RX ORDER — OXYCODONE HYDROCHLORIDE AND ACETAMINOPHEN 5; 325 MG/1; MG/1
1 TABLET ORAL EVERY 4 HOURS PRN
Status: DISCONTINUED | OUTPATIENT
Start: 2022-08-29 | End: 2022-08-31 | Stop reason: HOSPADM

## 2022-08-29 RX ORDER — MORPHINE SULFATE 2 MG/ML
2 INJECTION, SOLUTION INTRAMUSCULAR; INTRAVENOUS
Status: DISCONTINUED | OUTPATIENT
Start: 2022-08-29 | End: 2022-08-31 | Stop reason: HOSPADM

## 2022-08-29 RX ADMIN — OXYCODONE AND ACETAMINOPHEN 2 TABLET: 5; 325 TABLET ORAL at 17:48

## 2022-08-29 RX ADMIN — SODIUM CHLORIDE, PRESERVATIVE FREE 10 ML: 5 INJECTION INTRAVENOUS at 08:18

## 2022-08-29 RX ADMIN — PIPERACILLIN AND TAZOBACTAM 3375 MG: 3; .375 INJECTION, POWDER, LYOPHILIZED, FOR SOLUTION INTRAVENOUS at 04:58

## 2022-08-29 RX ADMIN — SODIUM PHOSPHATE, MONOBASIC, MONOHYDRATE AND SODIUM PHOSPHATE, DIBASIC, ANHYDROUS 20 MMOL: 276; 142 INJECTION, SOLUTION INTRAVENOUS at 00:16

## 2022-08-29 RX ADMIN — SODIUM CHLORIDE, PRESERVATIVE FREE 10 ML: 5 INJECTION INTRAVENOUS at 21:50

## 2022-08-29 RX ADMIN — SODIUM CHLORIDE, PRESERVATIVE FREE 10 ML: 5 INJECTION INTRAVENOUS at 00:47

## 2022-08-29 RX ADMIN — PIPERACILLIN AND TAZOBACTAM 3375 MG: 3; .375 INJECTION, POWDER, LYOPHILIZED, FOR SOLUTION INTRAVENOUS at 15:04

## 2022-08-29 RX ADMIN — ONDANSETRON 4 MG: 4 TABLET, ORALLY DISINTEGRATING ORAL at 12:32

## 2022-08-29 RX ADMIN — HYDROXYZINE HYDROCHLORIDE 10 MG: 10 TABLET ORAL at 17:48

## 2022-08-29 RX ADMIN — SODIUM CHLORIDE, PRESERVATIVE FREE 10 ML: 5 INJECTION INTRAVENOUS at 08:19

## 2022-08-29 RX ADMIN — OXYCODONE AND ACETAMINOPHEN 2 TABLET: 5; 325 TABLET ORAL at 12:34

## 2022-08-29 RX ADMIN — ROPIVACAINE HYDROCHLORIDE: 5 INJECTION EPIDURAL; INFILTRATION; PERINEURAL at 00:08

## 2022-08-29 RX ADMIN — SODIUM CHLORIDE, PRESERVATIVE FREE 10 ML: 5 INJECTION INTRAVENOUS at 21:51

## 2022-08-29 RX ADMIN — ONDANSETRON 4 MG: 2 INJECTION INTRAMUSCULAR; INTRAVENOUS at 06:57

## 2022-08-29 RX ADMIN — SODIUM CHLORIDE, PRESERVATIVE FREE 10 ML: 5 INJECTION INTRAVENOUS at 00:46

## 2022-08-29 RX ADMIN — PIPERACILLIN AND TAZOBACTAM 3375 MG: 3; .375 INJECTION, POWDER, LYOPHILIZED, FOR SOLUTION INTRAVENOUS at 21:39

## 2022-08-29 RX ADMIN — ANTACID TABLETS 500 MG: 500 TABLET, CHEWABLE ORAL at 16:36

## 2022-08-29 RX ADMIN — ONDANSETRON 4 MG: 2 INJECTION INTRAMUSCULAR; INTRAVENOUS at 00:44

## 2022-08-29 RX ADMIN — SODIUM CHLORIDE, PRESERVATIVE FREE 40 MG: 5 INJECTION INTRAVENOUS at 08:17

## 2022-08-29 RX ADMIN — ONDANSETRON 4 MG: 2 INJECTION INTRAMUSCULAR; INTRAVENOUS at 21:44

## 2022-08-29 ASSESSMENT — PAIN SCALES - GENERAL
PAINLEVEL_OUTOF10: 0
PAINLEVEL_OUTOF10: 7

## 2022-08-29 NOTE — ADDENDUM NOTE
Addendum  created 08/29/22 1351 by CHANCE Malloy CRNA    Intraprocedure Event edited, LDA properties accepted

## 2022-08-29 NOTE — PROGRESS NOTES
Hospitalist Progress Note      PCP: Mir Sterling MD    Date of Admission: 8/24/2022    Chief Complaint:  no acute events, afebrile, stable HD, on RA    Medications:  Reviewed    Infusion Medications    dextrose 5% in lactated ringers Stopped (08/29/22 0015)    sodium chloride      fentanyl epidural builder 6 mL/hr at 08/29/22 0008    sodium chloride      sodium chloride       Scheduled Medications    pantoprazole (PROTONIX) 40 mg injection  40 mg IntraVENous Daily    sodium chloride flush  5-40 mL IntraVENous 2 times per day    sodium chloride flush  5-40 mL IntraVENous 2 times per day    piperacillin-tazobactam  3,375 mg IntraVENous Q8H    sodium chloride flush  5-40 mL IntraVENous 2 times per day     PRN Meds: ketorolac, sodium chloride, naloxone, sodium chloride flush, sodium chloride, sodium chloride flush, sodium chloride, ondansetron **OR** ondansetron, sodium chloride flush, sodium chloride, ondansetron **OR** ondansetron, HYDROmorphone **OR** HYDROmorphone, nicotine      Intake/Output Summary (Last 24 hours) at 8/29/2022 1122  Last data filed at 8/29/2022 0920  Gross per 24 hour   Intake 2695.01 ml   Output 1805 ml   Net 890.01 ml         Exam:    /72   Pulse 82   Temp 98.4 °F (36.9 °C) (Oral)   Resp 16   Ht 5' 5\" (1.651 m)   Wt 148 lb 8 oz (67.4 kg)   LMP  (LMP Unknown)   SpO2 96%   BMI 24.71 kg/m²     General appearance: appears stated age and cooperative. Respiratory:  clear to auscultation, bilaterally  Cardiovascular: Regular rate and rhythm, S1/S2 . Abdomen: colostomy bag present, active bowel sounds. Musculoskeletal: No edema bilaterally.       Labs:   Recent Labs     08/27/22  0422 08/28/22  0457 08/29/22  0550   WBC 12.3* 11.2* 8.1   HGB 8.7* 8.4* 9.2*   HCT 25.2* 24.8* 27.4*    151 211       Recent Labs     08/27/22  0422 08/28/22  0457 08/29/22  0550    138 143   K 3.6 2.9* 3.7    105 108*   CO2 23 27 24   BUN 5* 7 5*   CREATININE 0.52 0.49* 0.37*   CALCIUM 8.4*

## 2022-08-29 NOTE — PROGRESS NOTES
Wound Ostomy Continence Nurse  Ostomy Referral Follow-up Progress Note      NAME:  Leigh Ann Gallegos  MEDICAL RECORD NUMBER:  [id number]  AGE: 61 y.o. GENDER:  female  :  1965  TODAY'S DATE:  2022    Subjective:    Leigh Ann Gallegos is a 61 y.o. female referred by:   [x] Physician  [] Nursing  [] Other:     End colostomy and New    Objective    /72   Pulse 82   Temp 98.4 °F (36.9 °C) (Oral)   Resp 16   Ht 5' 5\" (1.651 m)   Wt 148 lb 8 oz (67.4 kg)   LMP  (LMP Unknown)   SpO2 96%   BMI 24.71 kg/m²     Hilton Risk Score Hilton Scale Score: 19    Assessment   Surgeon -Dr. Marla Babcock    Colostomy      Colostomy LUQ (Active)   Stomal Appliance 2 piece; Changed 22   Flange Size (inches) 3 Inches 22   Stoma  Assessment Red;Moist;Edema;Protrudes 22   Peristomal Assessment Clean; Intact 22   Treatment Bag change;Site care; Other (Comment) 22   Stool Appearance Loose 22   Stool Color Brown 22   Stool Amount Small 22   Output (mL) 30 ml 22   Number of days: 3     Patient is tearful upon arrival of this [facility] [address]  Nurse. Patient states she is exhausted and has not slept much in the last couple nights and that she is just feeling very emotional about the findings during her surgery. Patient verbalizes that she looked at her stoma this morning and she also looked at it at this time with this [id number] Nurse. Patient is tolerating a general diet, though she did not like the take of her breakfast omelet this time; denies n/v. Patient has been getting up to the chair and verbalizes looking forward to walking in the halls more once epidural is removed. This [id number] Nurse is unable to accommodate ostomy education lesson today - confirmed with patient's  that he can meet tomorrow morning for the lesson. New colostomy appliance applied at this time without incident.  Stoma in the LUQ of the abdomen is red, moist, edematous, and protrudes. Colostomy is functioning, small amount of loose brown stool in the bag at this time. Patient was interested in learning about the appliance, demonstrated emptying the bag and patient return demonstrated at this time. Plan for formal education lesson tomorrow morning at 11:30 am with patient and patient's       Intake/Output Summary (Last 24 hours) at 8/29/2022 1109  Last data filed at 8/29/2022 0920  Gross per 24 hour   Intake 2695.01 ml   Output 1785 ml   Net 910.01 ml       Plan:   Plan for Ostomy Care: See above    Ostomy Plan of Care  [x] Supplies/Instructions left in room  [] Patient using home supplies  [x] Brand/supplies at bedside - [employer] Sensura Brock    Current Diet: ADULT DIET;  Regular; Low Fiber  ADULT ORAL NUTRITION SUPPLEMENT; Breakfast, Lunch, Dinner; Clear Liquid Oral Supplement  Dietician consult:  N/A    Discharge Plan:  Placement for patient upon discharge: home with support    Outpatient visit plan No  Supplies given Yes   Samples requested N/A    Referrals:  []   [] [address] [place] [address]  [] Supplies  [] Other    Patient/Caregiver Teaching:  Written Instructions given to patient/family: Patient   Teaching provided:  [] Reviewed GI and A&P        [x] Supplies  [] Pouch emptying      [x] Manipulate closure  [x] Routine Care         [] Comment  [] Pouch maintenance           Level of patient/caregiver understanding able to:  [] Indicates understanding       [] Needs reinforcement  [] Unsuccessful      [x] Verbal Understanding  [] Demonstrated understanding       [] No evidence of learning  [] Refused teaching         [] N/A    Electronically signed by LEXA Barajas, RN, CWOCN on 8/29/2022 at 11:18 AM

## 2022-08-29 NOTE — PROGRESS NOTES
Pt has one IV and due to multiple medication being non compatible  to current fluid, 3 unsuccessful attempts were made throughout the night by two different nurses. Pt declined anymore sticks. Pt stated we would have to do with the one IV she has.

## 2022-08-29 NOTE — PROGRESS NOTES
Pt Name: Chanelle Valladares  Medical Record Number: [id number]  Date of Birth 1965   Admit date 2022  1:48 PM  Today's Date: 2022     ASSESSMENT  1. Hospital day # 5  2 postop day #4 partial colectomy/colostomy      PLAN  1.  Leave Rothman catheter in for 6 more days. She will need to have education regarding home management  2. Advance diet  3. Stop epidural  4. Traditional pain medication  5. Hep-Lock IV  6.  DC HO drain    SUBJECTIVE  Chief complaint: Follow-up colectomy/colostomy  Afebrile, vital signs are stable. She denies any nausea or vomiting, colostomy working. She is tolerating a ADULT DIET; Regular; Low Fiber  ADULT ORAL NUTRITION SUPPLEMENT; Breakfast, Lunch, Dinner; Standard High Calorie/High Protein Oral Supplement. Her pain is well controlled on current medications. She has been ambulating in the halls. has no past medical history on file. CURRENT MEDS  Scheduled Meds:   pantoprazole (PROTONIX) 40 mg injection  40 mg IntraVENous Daily    sodium chloride flush  5-40 mL IntraVENous 2 times per day    sodium chloride flush  5-40 mL IntraVENous 2 times per day    piperacillin-tazobactam  3,375 mg IntraVENous Q8H    sodium chloride flush  5-40 mL IntraVENous 2 times per day     Continuous Infusions:   sodium chloride      sodium chloride      sodium chloride       PRN Meds:.oxyCODONE-acetaminophen **OR** oxyCODONE-acetaminophen, morphine **OR** morphine, ketorolac, sodium chloride, naloxone, sodium chloride flush, sodium chloride, sodium chloride flush, sodium chloride, ondansetron **OR** ondansetron, sodium chloride flush, sodium chloride, nicotine    OBJECTIVE  CURRENT VITALS:  height is 5' 5\" (1.651 m) and weight is 148 lb 8 oz (67.4 kg). Her oral temperature is 98.4 °F (36.9 °C). Her blood pressure is 115/72 and her pulse is 82. Her respiration is 16 and oxygen saturation is 96%.    Temperature Range (24h):Temp: 98.4 °F (36.9 °C) Temp  Av.4 °F (36.9 °C)  Min: 98.1 °F (36.7 °C) Max: 98.8 °F (37.1 °C)  BP Range (01W): Systolic (68FDV), KFL:940 , Min:115 , DCQ:545     Diastolic (47UZA), XBQ:35, Min:69, Max:73    Pulse Range (24h): Pulse  Av  Min: 76  Max: 84  Respiration Range (24h): Resp  Av.2  Min: 16  Max: 18    GENERAL: alert, no distress  LUNGS: clear to ausculation, without wheezes, rales or rhonci  HEART: normal rate and regular rhythm  ABDOMEN: soft, non-tender, non-distended, colostomy looks fine, bowel sounds present in all 4 quadrants, and no guarding or peritoneal signs  EXTERMITY: no cyanosis, clubbing or edema  In: 2695 [P.O.:480; I.V.:1111.2]  Out: 1805 [Urine:1550; Drains:200]  Date 22 0000 - 22 2359   Shift 7257-4931 4079-6745 0307-4845 24 Hour Total   INTAKE   P.O.(mL/kg/hr) 480(0.9)   480   I. V.(mL/kg)  5667.3(25.5)  1111.2(16.5)   IV Piggyback(mL/kg)  6935.7(76.9)  1103.8(16.4)   Shift Total(mL/kg) 480(7.1) 3750(78.1)  9912(72)   OUTPUT   Urine(mL/kg/hr) 1550(2.9)   1550   Drains(mL/kg) 80(1.2) 20(0.3)  100(1.5)   Stool(mL/kg) 25(0.4) 30(0.4)  55(0.8)   Shift Total(mL/kg) 1655(24.6) 50(0.7)  1705(25.3)   Weight (kg) 67.4 67.4 67.4 67.4       LABS  Recent Labs     22  0422 22  0457 22  0550   WBC 12.3* 11.2* 8.1   HGB 8.7* 8.4* 9.2*   HCT 25.2* 24.8* 27.4*    151 211    138 143   K 3.6 2.9* 3.7    105 108*   CO2 23 27 24   BUN 5* 7 5*   CREATININE 0.52 0.49* 0.37*   MG  --   --  1.7   PHOS 1.9* 1.8* 3.5   CALCIUM 8.4* 8.5 8.6      No results for input(s): PTT, INR in the last 72 hours. Invalid input(s): PT  No results for input(s): AST, ALT, BILITOT, BILIDIR, AMYLASE, LIPASE, LDH, LACTA in the last 72 hours. RADIOLOGY  CT ABDOMEN PELVIS W IV CONTRAST Additional Contrast? None    Result Date: 2022  CT ABDOMEN PELVIS W IV CONTRAST HISTORY:   Diffuse abdominal pain, worse in the left lower quadrant. Nausea. Constipation. Bloating. History of diverticulitis.  TECHNIQUE: CT of the abdomen and pelvis was performed using standard technique, scanning from just above the dome of the diaphragm to the symphysis pubis. Sagittal and coronal performed. Contrast: IV: 50 ml Isovue 300 Oral:  None. All CT scans at this facility use dose modulation, iterative reconstruction, and/or weight based dosing when appropriate to reduce radiation dose to as low as reasonably achievable. COMPARISON: CT 11/9/2016. RESULT: Liver: No mass or lesion. Biliary: Gallbladder unremarkable. No bile duct dilation. Pancreas: No mass or duct dilation. Spleen: No mass or splenomegaly. Adrenals: No mass. Kidneys: No calculus or hydronephrosis. No suspicious renal lesions. GI tract: Extensive feces throughout the colon. Focal area of masslike wall thickening (\"apple core lesion\") within the distal sigmoid colon within the pelvis suspicious for obstructing neoplasm, with associated distention of the upstream sigmoid, descending, transverse, and ascending colon. Small bowel normal caliber. Moderate hiatal hernia. Diverticulosis without evidence for acute diverticulitis. Lymph nodes: No abdominal or pelvic lymphadenopathy. Mesentery/Peritoneum/Retroperitoneum: No ascites or mass. Vasculature: The celiac axis and SMA are patent. The portal vein and branches, splenic vein, SMV, and hepatic veins are patent. Mild arterial atherosclerotic disease without aneurysm. Pelvis: No significant free fluid. Uterus grossly unremarkable. Bladder decompressed. See above for colonic findings. Bones: No acute osseous findings. No destructive osseous lesions. Soft tissues: Unremarkable. Lower thorax: Unremarkable. Findings suspicious for obstructing colonic neoplasm in the distal sigmoid colon. Further evaluation with colonoscopy is suggested. ==========     XR CHEST PORTABLE    Result Date: 8/17/2022  EXAMINATION: XR CHEST PORTABLE CLINICAL HISTORY: 78year-old with epigastric pain radiating to the chest COMPARISONS: None available.  FINDINGS: Portable frontal view of

## 2022-08-29 NOTE — PROGRESS NOTES
Stopped epidural per order. Called anesthesis to notify to remove. Spoke with Ivis Chu, who will see patient in about an hour.   Electronically signed by Michel Crowley RN on 8/29/2022 at 12:44 PM

## 2022-08-29 NOTE — PROGRESS NOTES
Spiritual Care Services     Summary of Visit:  Pt tearful, feeling very fragile today after not sleeping well last night. Prayer and emotional support provided. Also gave a short Healing Touch treatment, and will return to do so again tomorrow. Pt's son and  with her when I left. Spiritual Assessment/Intervention/Outcomes:    Encounter Summary  Encounter Overview/Reason : Spiritual/Emotional Needs, Grief, Loss, and Adjustments  Service Provided For[de-identified] Patient and family together  Referral/Consult From[de-identified] Other   Support System: Spouse, Children  Last Encounter : 08/28/22  Complexity of Encounter: Moderate  Begin Time: 1615  End Time : 1645  Total Time Calculated: 30 min  Encounter   Type: Follow up     Spiritual/Emotional needs  Type: Other (comment)  Rituals, Rites and Sacraments  Type: Hinduism Communion  Grief, Loss, and Adjustments  Type: Adjustment to illness, Life Adjustments              Values / Beliefs  Do You Have Any Ethnic, Cultural, Sacramental, or Spiritual Yarsani Needs You Would Like Us To Be Aware of While You Are in the Hospital : No    Care Plan:    Ongoing emotional and spiritual support. Spiritual Care Services   Electronically signed by Fletcher Snow on 8/29/22 at 4:42 PM EDT     To reach a  for emotional and spiritual support, place an [facility] consult request.   If a  is needed immediately, dial 0 and ask to page the on-call .

## 2022-08-29 NOTE — PROGRESS NOTES
Comprehensive Nutrition Assessment    Type and Reason for Visit:  Reassess    Nutrition Recommendations/Plan:   Continue Low fiber diet    Start high calorie ONS TID (vanilla) until po > 50% og meals     Malnutrition Assessment:  Malnutrition Status: At risk for malnutrition (Comment) (08/26/22 1038)    Context:  Social/Environmental Circumstances     Findings of the 6 clinical characteristics of malnutrition:  Energy Intake:  50% or less estimated energy requirements for 1 month or longer  Weight Loss:  Unable to assess     Body Fat Loss:  No significant body fat loss     Muscle Mass Loss:  No significant muscle mass loss    Fluid Accumulation:  No significant fluid accumulation     Strength:  Not Performed    Nutrition Assessment:    Pt at risk for malnutrition related to indequate energy/protein intake ~ 1 month PTA, po diet started yesterday. 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: surgery 8/25 \"Rectosigmoid resection with ,total abdominal hysterectomy and  bilateral salpingo-oophorectomy,End colostomy \"  NG removed, minimal amount of colostomy output,  labs noted, meds reviewed, peripheral access Wound Type: Surgical Incision       Current Nutrition Intake & Therapies:    Average Meal Intake: 1-25%  Average Supplements Intake: None Ordered  ADULT DIET;  Regular; Low Fiber  ADULT ORAL NUTRITION SUPPLEMENT; Breakfast, Lunch, Dinner; Standard High Calorie/High Protein Oral Supplement    Anthropometric Measures:  Height: 5' 5\" (165.1 cm)  Ideal Body Weight (IBW): 125 lbs (57 kg)    Admission Body Weight: 150 lb (68 kg) (stated)  Current Body Weight: 148 lb 8 oz (67.4 kg) (8/26), 118.8 %  Current BMI (kg/m2): 24.7  Usual Body Weight: 174 lb (78.9 kg) (2/12/2020)  % Weight Change (Calculated): -14.7                    BMI Categories: Normal Weight (BMI 18.5-24. 9)    Estimated Daily Nutrient Needs:  Energy Requirements Based On: Kcal/kg  Weight Used for Energy Requirements: Current  Energy (kcal/day): 6169-3548 kcals @ 22-25 kcal/kg  Weight Used for Protein Requirements: Current  Protein (g/day): 80-94 g protein @ 1.2-1.4 g/kg  Method Used for Fluid Requirements: 1 ml/kcal  Fluid (ml/day): ~2000    Nutrition Diagnosis:   Inadequate oral intake related to altered GI function as evidenced by NPO or clear liquid status due to medical condition    Nutrition Interventions:   Food and/or Nutrient Delivery: Continue Current Diet, Start Oral Nutrition Supplement (Continue Low fiber diet  Start high calorie ONS TID (vanilla) until po > 50% og meals)  Nutrition Education/Counseling: Education not indicated  Coordination of Nutrition Care: Continue to monitor while inpatient       Goals:  Previous Goal Met: Goal(s) Achieved  Goals: PO intake 50% or greater (New goal with initiation of po diet)       Nutrition Monitoring and Evaluation:   Behavioral-Environmental Outcomes: None Identified  Food/Nutrient Intake Outcomes: Food and Nutrient Intake, Supplement Intake, IVF Intake  Physical Signs/Symptoms Outcomes: GI Status, Weight, Meal Time Behavior    Discharge Planning:     Too soon to determine     Reggy TRUNG Rendon, LD

## 2022-08-29 NOTE — CARE COORDINATION
This LSW met with patient at bedside this am. Patient and I discussed discharge plans. D/C PLAN ; [address] [facility]. [facility] AT [facility] NOTIFIED TODAY ,  OF PROBABLE REFERRAL FOR NEW COLOSTOMY AND POSSIBLE IV ATB. LSW / CM to follow.   Electronically signed by CHANG Hines, LSW on 8/29/22 at 10:05 AM EDT

## 2022-08-30 LAB
ALBUMIN SERPL-MCNC: 3 G/DL (ref 3.5–4.6)
ANION GAP SERPL CALCULATED.3IONS-SCNC: 12 MEQ/L (ref 9–15)
BASOPHILS ABSOLUTE: 0 K/UL (ref 0–0.2)
BASOPHILS RELATIVE PERCENT: 0.4 %
BUN BLDV-MCNC: 9 MG/DL (ref 6–20)
CALCIUM SERPL-MCNC: 9.1 MG/DL (ref 8.5–9.9)
CHLORIDE BLD-SCNC: 104 MEQ/L (ref 95–107)
CO2: 25 MEQ/L (ref 20–31)
CREAT SERPL-MCNC: 0.36 MG/DL (ref 0.5–0.9)
EOSINOPHILS ABSOLUTE: 0.3 K/UL (ref 0–0.7)
EOSINOPHILS RELATIVE PERCENT: 4.2 %
GFR AFRICAN AMERICAN: >60
GFR NON-AFRICAN AMERICAN: >60
GLUCOSE BLD-MCNC: 111 MG/DL (ref 70–99)
HCT VFR BLD CALC: 28.8 % (ref 37–47)
HEMOGLOBIN: 9.8 G/DL (ref 12–16)
LYMPHOCYTES ABSOLUTE: 1.5 K/UL (ref 1–4.8)
LYMPHOCYTES RELATIVE PERCENT: 18.4 %
MCH RBC QN AUTO: 29.4 PG (ref 27–31.3)
MCHC RBC AUTO-ENTMCNC: 34 % (ref 33–37)
MCV RBC AUTO: 86.3 FL (ref 82–100)
MONOCYTES ABSOLUTE: 0.8 K/UL (ref 0.2–0.8)
MONOCYTES RELATIVE PERCENT: 10.2 %
NEUTROPHILS ABSOLUTE: 5.4 K/UL (ref 1.4–6.5)
NEUTROPHILS RELATIVE PERCENT: 66.8 %
PDW BLD-RTO: 15 % (ref 11.5–14.5)
PHOSPHORUS: 3.9 MG/DL (ref 2.3–4.8)
PLATELET # BLD: 291 K/UL (ref 130–400)
POTASSIUM SERPL-SCNC: 3.5 MEQ/L (ref 3.4–4.9)
RBC # BLD: 3.34 M/UL (ref 4.2–5.4)
SODIUM BLD-SCNC: 141 MEQ/L (ref 135–144)
WBC # BLD: 8.1 K/UL (ref 4.8–10.8)

## 2022-08-30 PROCEDURE — 6360000002 HC RX W HCPCS: Performed by: COLON & RECTAL SURGERY

## 2022-08-30 PROCEDURE — 2580000003 HC RX 258: Performed by: COLON & RECTAL SURGERY

## 2022-08-30 PROCEDURE — 36415 COLL VENOUS BLD VENIPUNCTURE: CPT

## 2022-08-30 PROCEDURE — 80069 RENAL FUNCTION PANEL: CPT

## 2022-08-30 PROCEDURE — C9113 INJ PANTOPRAZOLE SODIUM, VIA: HCPCS | Performed by: COLON & RECTAL SURGERY

## 2022-08-30 PROCEDURE — 1210000000 HC MED SURG R&B

## 2022-08-30 PROCEDURE — 85025 COMPLETE CBC W/AUTO DIFF WBC: CPT

## 2022-08-30 PROCEDURE — 99024 POSTOP FOLLOW-UP VISIT: CPT | Performed by: COLON & RECTAL SURGERY

## 2022-08-30 PROCEDURE — 6370000000 HC RX 637 (ALT 250 FOR IP): Performed by: INTERNAL MEDICINE

## 2022-08-30 PROCEDURE — 99215 OFFICE O/P EST HI 40 MIN: CPT

## 2022-08-30 RX ADMIN — ONDANSETRON 4 MG: 2 INJECTION INTRAMUSCULAR; INTRAVENOUS at 14:33

## 2022-08-30 RX ADMIN — KETOROLAC TROMETHAMINE 30 MG: 30 INJECTION, SOLUTION INTRAMUSCULAR; INTRAVENOUS at 01:34

## 2022-08-30 RX ADMIN — HYDROXYZINE HYDROCHLORIDE 10 MG: 10 TABLET ORAL at 22:38

## 2022-08-30 RX ADMIN — ANTACID TABLETS 500 MG: 500 TABLET, CHEWABLE ORAL at 22:38

## 2022-08-30 RX ADMIN — PIPERACILLIN AND TAZOBACTAM 3375 MG: 3; .375 INJECTION, POWDER, LYOPHILIZED, FOR SOLUTION INTRAVENOUS at 06:40

## 2022-08-30 RX ADMIN — HYDROXYZINE HYDROCHLORIDE 10 MG: 10 TABLET ORAL at 01:34

## 2022-08-30 RX ADMIN — ANTACID TABLETS 500 MG: 500 TABLET, CHEWABLE ORAL at 01:34

## 2022-08-30 RX ADMIN — SODIUM CHLORIDE, PRESERVATIVE FREE 10 ML: 5 INJECTION INTRAVENOUS at 08:40

## 2022-08-30 RX ADMIN — SODIUM CHLORIDE, PRESERVATIVE FREE 40 MG: 5 INJECTION INTRAVENOUS at 08:40

## 2022-08-30 ASSESSMENT — PAIN DESCRIPTION - LOCATION: LOCATION: ABDOMEN

## 2022-08-30 ASSESSMENT — PAIN SCALES - GENERAL: PAINLEVEL_OUTOF10: 8

## 2022-08-30 ASSESSMENT — PAIN DESCRIPTION - DESCRIPTORS: DESCRIPTORS: ACHING

## 2022-08-30 NOTE — PROGRESS NOTES
Pt Name: Zoltan Moore  Medical Record Number: [id number]  Date of Birth 1965   Admit date 2022  1:48 PM  Today's Date: 2022     ASSESSMENT  1. Hospital day # 6  2 postop day #5 sigmoid resection with diverting colostomy    PLAN  1. Ostomy education  2. DC Zosyn  3. Ambulate    SUBJECTIVE  Chief complaint: Follow-up colectomy  Afebrile, vital signs are stable. She denies any nausea or vomiting, colostomy working She is tolerating a ADULT DIET; Regular; Low Fiber  ADULT ORAL NUTRITION SUPPLEMENT; Breakfast, Lunch, Dinner; Standard High Calorie/High Protein Oral Supplement. Her pain is well controlled on current medications. She has been ambulating in the halls. has no past medical history on file. CURRENT MEDS  Scheduled Meds:   pantoprazole (PROTONIX) 40 mg injection  40 mg IntraVENous Daily    sodium chloride flush  5-40 mL IntraVENous 2 times per day    sodium chloride flush  5-40 mL IntraVENous 2 times per day    sodium chloride flush  5-40 mL IntraVENous 2 times per day     Continuous Infusions:   sodium chloride      sodium chloride      sodium chloride       PRN Meds:.oxyCODONE-acetaminophen **OR** oxyCODONE-acetaminophen, morphine **OR** morphine, calcium carbonate, hydrOXYzine HCl, ketorolac, sodium chloride, naloxone, sodium chloride flush, sodium chloride, sodium chloride flush, sodium chloride, ondansetron **OR** ondansetron, sodium chloride flush, sodium chloride, nicotine    OBJECTIVE  CURRENT VITALS:  height is 5' 5\" (1.651 m) and weight is 148 lb 8 oz (67.4 kg). Her temperature is 98.1 °F (36.7 °C). Her blood pressure is 140/80 (abnormal) and her pulse is 68. Her respiration is 16 and oxygen saturation is 97%.    Temperature Range (24h):Temp: 98.1 °F (36.7 °C) Temp  Av.1 °F (36.7 °C)  Min: 98.1 °F (36.7 °C)  Max: 98.1 °F (36.7 °C)  BP Range (24H): Systolic (30JWY), OJL:842 , Min:140 , UXT:738     Diastolic (13ELV), XOU:85, Min:80, Max:80    Pulse Range (24h): Pulse  Av Min: 76  Max: 68  Respiration Range (24h): Resp  Av  Min: 16  Max: 16    GENERAL: alert, no distress  LUNGS: clear to ausculation, without wheezes, rales or rhonci  HEART: normal rate and regular rhythm  ABDOMEN: non-distended, colostomy looks fine, bowel sounds present in all 4 quadrants, and no guarding or peritoneal signs  EXTERMITY: no cyanosis, clubbing or edema  In: 2215 [I.V.:1111.2]  Out: 50 [Drains:20]      LABS  Recent Labs     22  0457 22  0550 22  0546   WBC 11.2* 8.1 8.1   HGB 8.4* 9.2* 9.8*   HCT 24.8* 27.4* 28.8*    211 291    143  --    K 2.9* 3.7  --     108*  --    CO2 27 24  --    BUN 7 5*  --    CREATININE 0.49* 0.37*  --    MG  --  1.7  --    PHOS 1.8* 3.5  --    CALCIUM 8.5 8.6  --       No results for input(s): PTT, INR in the last 72 hours. Invalid input(s): PT  No results for input(s): AST, ALT, BILITOT, BILIDIR, AMYLASE, LIPASE, LDH, LACTA in the last 72 hours. RADIOLOGY  CT ABDOMEN PELVIS W IV CONTRAST Additional Contrast? None    Result Date: 2022  CT ABDOMEN PELVIS W IV CONTRAST HISTORY:   Diffuse abdominal pain, worse in the left lower quadrant. Nausea. Constipation. Bloating. History of diverticulitis. TECHNIQUE: CT of the abdomen and pelvis was performed using standard technique, scanning from just above the dome of the diaphragm to the symphysis pubis. Sagittal and coronal performed. Contrast: IV: 50 ml Isovue 300 Oral:  None. All CT scans at this facility use dose modulation, iterative reconstruction, and/or weight based dosing when appropriate to reduce radiation dose to as low as reasonably achievable. COMPARISON: CT 2016. RESULT: Liver: No mass or lesion. Biliary: Gallbladder unremarkable. No bile duct dilation. Pancreas: No mass or duct dilation. Spleen: No mass or splenomegaly. Adrenals: No mass. Kidneys: No calculus or hydronephrosis. No suspicious renal lesions. GI tract: Extensive feces throughout the colon.  Focal area of masslike wall thickening (\"apple core lesion\") within the distal sigmoid colon within the pelvis suspicious for obstructing neoplasm, with associated distention of the upstream sigmoid, descending, transverse, and ascending colon. Small bowel normal caliber. Moderate hiatal hernia. Diverticulosis without evidence for acute diverticulitis. Lymph nodes: No abdominal or pelvic lymphadenopathy. Mesentery/Peritoneum/Retroperitoneum: No ascites or mass. Vasculature: The celiac axis and SMA are patent. The portal vein and branches, splenic vein, SMV, and hepatic veins are patent. Mild arterial atherosclerotic disease without aneurysm. Pelvis: No significant free fluid. Uterus grossly unremarkable. Bladder decompressed. See above for colonic findings. Bones: No acute osseous findings. No destructive osseous lesions. Soft tissues: Unremarkable. Lower thorax: Unremarkable. Findings suspicious for obstructing colonic neoplasm in the distal sigmoid colon. Further evaluation with colonoscopy is suggested. ==========     XR CHEST PORTABLE    Result Date: 8/17/2022  EXAMINATION: XR CHEST PORTABLE CLINICAL HISTORY: 78year-old with epigastric pain radiating to the chest COMPARISONS: None available. FINDINGS: Portable frontal view of the chest was obtained at 13:12 hours. Cardiac and mediastinal contours are within normal limits. Pulmonary vascularity is not distended. No large airspace consolidations, pleural effusions or pneumothoraces. Visualized  bones are intact.      NO RADIOGRAPHIC FINDINGS OF ACUTE CARDIOPULMONARY DISEASE ON PORTABLE PLAIN FILM    Electronically signed by Natalie Flores MD on 8/30/2022 at 7:23 AM

## 2022-08-30 NOTE — PROGRESS NOTES
Spiritual Care Services     Summary of Visit:  Pt more physically comfortable today, more emotionally and spiritually grounded as well. Shared more of her family story, and the role she and her  have in raising their grandson. Pointed out places of josh in her story. Spiritual Assessment/Intervention/Outcomes:    Encounter Summary  Encounter Overview/Reason : Spiritual/Emotional Needs  Service Provided For[de-identified] Patient  Referral/Consult From[de-identified] Patient  Support System: Spouse, Children  Last Encounter : 08/29/22  Complexity of Encounter: Moderate  Begin Time: 1400  End Time : 1430  Total Time Calculated: 30 min  Encounter   Type: Follow up     Spiritual/Emotional needs  Type: Other (comment)  Rituals, Rites and Sacraments  Type: Zoroastrianism Communion  Grief, Loss, and Adjustments  Type: Adjustment to illness              Values / Beliefs  Do You Have Any Ethnic, Cultural, Sacramental, or Spiritual Nondenominational Needs You Would Like Us To Be Aware of While You Are in the Hospital : No    Care Plan:    Ongoing support. Spiritual Care Services   Electronically signed by Ephraim Francisco on 8/30/22 at 2:27 PM EDT     To reach a  for emotional and spiritual support, place an [facility] consult request.   If a  is needed immediately, dial 0 and ask to page the on-call .

## 2022-08-30 NOTE — PROGRESS NOTES
hours. No results for input(s): INR in the last 72 hours. No results for input(s): Patrisha Meigs in the last 72 hours. 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   - s/p ex lap with rectosigmoid resection, total abdominal hysterectomy and bilateral salpingo-oophorectomy, enterectomy, left ureteral repair, and end colostomy on 8/25  - postoperative pain control, DVT prophylaxis, antibiotics, diet, activity, wound care per primary service    Acute blood loss anemia  - s/p PRBC transfusion  - follow H/H    Leukocytosis   - likely reactive, improved    Hypokalemia / hypophophatemia  - replaced        Diet: ADULT DIET;  Regular; Low Fiber  ADULT ORAL NUTRITION SUPPLEMENT; Breakfast, Lunch, Dinner; Standard High Calorie/High Protein Oral Supplement    Code Status: Full Code            Electronically signed by Jose Luis Martin MD on 8/30/2022 at 11:48 AM

## 2022-08-30 NOTE — PROGRESS NOTES
0840: Assessment completed. A&Ox4. VSS. Pt up to chair at this time with stand by assist. Pt tolerated fairly well-c/o of some nausea on transfer and was anxious. ABD dressing is CDI. Ostomy is in place. Nasir Ask wound nurse to do ostomy education at 11:30 today. Pt denies further needs at this time. Safety maintained in room. Comfort measures provided. 1045: Patient up with walker at this time and ambulated the hallway with this RN.     [id number]: Patient up with walker at this time and ambulated the hallway x2 lap with this RN. Dr. Nate Solis on floor and aware of boles draining bloody drainage--this is an expected finding.  Will still need follow up with patient in 4 weeks

## 2022-08-30 NOTE — PROGRESS NOTES
Wound Ostomy Continence Nurse  Ostomy Referral Follow-up Progress Note      NAME:  Sean Holt  MEDICAL RECORD NUMBER:  [id number]  AGE: 61 y.o. GENDER:  female  :  1965  TODAY'S DATE:  2022    Subjective:    Sean oHlt is a 61 y.o. female referred by:   [x] Physician  [] Nursing  [] Other:     End colostomy and New    Objective    /71   Pulse 77   Temp 98.1 °F (36.7 °C) (Oral)   Resp 18   Ht 5' 5\" (1.651 m)   Wt 148 lb 8 oz (67.4 kg)   LMP  (LMP Unknown)   SpO2 96%   BMI 24.71 kg/m²     Hilton Risk Score Hilton Scale Score: 19    Assessment   Surgeon - Dr. Rona Williamson    Colostomy      Colostomy LUQ (Active)   Stomal Appliance 2 piece;Clean, dry & intact 22 1130   Flange Size (inches) 3 Inches 22 1130   Stoma  Assessment Pink;Moist;Protrudes 22 1130   Peristomal Assessment Clean; Intact 22 1130   Treatment Bag change;Site care; Other (Comment) 22 0920   Stool Appearance Watery 22 1130   Stool Color Brown 22 1130   Stool Amount Small 22 1130   Output (mL) 30 ml 22 0920   Number of days: 4     Patient sitting up in bed with  at bedside for new colostomy education lesson at this time. Patient has been walking in the halls and up to the chair this morning. Patient is tolerating diet better than yesterday and feels her bowels are moving better - having cramping pains after walking, which is to be expected. No change in stoma appearance and colostomy has had very little brown watery output in the last 24 hours. Patient encouraged to continue walking today. New colostomy education presented to patient and patient's  using handouts, demonstrations, and hands on training. Patient and patient's  both participated in the entire lesson and asked appropriate questions. By end of the lesson patient and her  both able to return demonstrate emptying an appliance.  Patient

## 2022-08-30 NOTE — CARE COORDINATION
LSW meet with pt at bedside. Discuss DC plan with pt. Pt agree with [place] [facility] Pt denies any needs at this time. LSW will follow.      Electronically signed by Verner Plum, LSW on 8/30/2022 at 1:44 PM

## 2022-08-31 VITALS
HEART RATE: 84 BPM | OXYGEN SATURATION: 99 % | TEMPERATURE: 98.2 F | SYSTOLIC BLOOD PRESSURE: 125 MMHG | WEIGHT: 148.5 LBS | HEIGHT: 65 IN | BODY MASS INDEX: 24.74 KG/M2 | RESPIRATION RATE: 18 BRPM | DIASTOLIC BLOOD PRESSURE: 68 MMHG

## 2022-08-31 DIAGNOSIS — S37.10XA: Primary | ICD-10-CM

## 2022-08-31 LAB
ALBUMIN SERPL-MCNC: 3.1 G/DL (ref 3.5–4.6)
ANION GAP SERPL CALCULATED.3IONS-SCNC: 12 MEQ/L (ref 9–15)
BASOPHILS ABSOLUTE: 0 K/UL (ref 0–0.2)
BASOPHILS RELATIVE PERCENT: 0.4 %
BUN BLDV-MCNC: 11 MG/DL (ref 6–20)
CALCIUM SERPL-MCNC: 9.2 MG/DL (ref 8.5–9.9)
CHLORIDE BLD-SCNC: 102 MEQ/L (ref 95–107)
CO2: 25 MEQ/L (ref 20–31)
CREAT SERPL-MCNC: 0.38 MG/DL (ref 0.5–0.9)
EOSINOPHILS ABSOLUTE: 0.3 K/UL (ref 0–0.7)
EOSINOPHILS RELATIVE PERCENT: 2.8 %
GFR AFRICAN AMERICAN: >60
GFR NON-AFRICAN AMERICAN: >60
GLUCOSE BLD-MCNC: 107 MG/DL (ref 70–99)
HCT VFR BLD CALC: 29.8 % (ref 37–47)
HEMOGLOBIN: 10 G/DL (ref 12–16)
LYMPHOCYTES ABSOLUTE: 1.3 K/UL (ref 1–4.8)
LYMPHOCYTES RELATIVE PERCENT: 11.3 %
MCH RBC QN AUTO: 29.3 PG (ref 27–31.3)
MCHC RBC AUTO-ENTMCNC: 33.5 % (ref 33–37)
MCV RBC AUTO: 87.5 FL (ref 82–100)
MONOCYTES ABSOLUTE: 0.9 K/UL (ref 0.2–0.8)
MONOCYTES RELATIVE PERCENT: 7.7 %
NEUTROPHILS ABSOLUTE: 9.2 K/UL (ref 1.4–6.5)
NEUTROPHILS RELATIVE PERCENT: 77.8 %
PDW BLD-RTO: 15 % (ref 11.5–14.5)
PHOSPHORUS: 3.5 MG/DL (ref 2.3–4.8)
PLATELET # BLD: 368 K/UL (ref 130–400)
POTASSIUM SERPL-SCNC: 3.7 MEQ/L (ref 3.4–4.9)
RBC # BLD: 3.41 M/UL (ref 4.2–5.4)
SODIUM BLD-SCNC: 139 MEQ/L (ref 135–144)
WBC # BLD: 11.8 K/UL (ref 4.8–10.8)

## 2022-08-31 PROCEDURE — 6370000000 HC RX 637 (ALT 250 FOR IP): Performed by: COLON & RECTAL SURGERY

## 2022-08-31 PROCEDURE — 36415 COLL VENOUS BLD VENIPUNCTURE: CPT

## 2022-08-31 PROCEDURE — 99214 OFFICE O/P EST MOD 30 MIN: CPT

## 2022-08-31 PROCEDURE — 85025 COMPLETE CBC W/AUTO DIFF WBC: CPT

## 2022-08-31 PROCEDURE — 99024 POSTOP FOLLOW-UP VISIT: CPT | Performed by: COLON & RECTAL SURGERY

## 2022-08-31 PROCEDURE — 6370000000 HC RX 637 (ALT 250 FOR IP): Performed by: INTERNAL MEDICINE

## 2022-08-31 PROCEDURE — 80069 RENAL FUNCTION PANEL: CPT

## 2022-08-31 RX ORDER — ACETAMINOPHEN 325 MG/1
650 TABLET ORAL EVERY 4 HOURS PRN
Status: DISCONTINUED | OUTPATIENT
Start: 2022-08-31 | End: 2022-08-31 | Stop reason: HOSPADM

## 2022-08-31 RX ORDER — OXYCODONE HYDROCHLORIDE AND ACETAMINOPHEN 5; 325 MG/1; MG/1
1 TABLET ORAL EVERY 6 HOURS PRN
Qty: 12 TABLET | Refills: 0 | Status: SHIPPED | OUTPATIENT
Start: 2022-08-31 | End: 2022-09-03

## 2022-08-31 RX ADMIN — ONDANSETRON 4 MG: 4 TABLET, ORALLY DISINTEGRATING ORAL at 10:10

## 2022-08-31 RX ADMIN — ACETAMINOPHEN 650 MG: 325 TABLET ORAL at 11:40

## 2022-08-31 RX ADMIN — ANTACID TABLETS 500 MG: 500 TABLET, CHEWABLE ORAL at 11:40

## 2022-08-31 ASSESSMENT — PAIN SCALES - GENERAL: PAINLEVEL_OUTOF10: 5

## 2022-08-31 NOTE — DISCHARGE SUMMARY
Physician Discharge Summary     Patient ID:  Ed See  [id number]  55 y.o.  1965    Admit date: 8/24/2022    Discharge date and time: 8/31/2022  3:30 PM     Admitting Physician: Lopez Lozano MD     Discharge Physician: Same    Admission Diagnoses: Intestinal obstruction [facility]) [K56.609]    Discharge Diagnoses: Same    Admission Condition: poor    Discharged Condition: good    Indication for Admission: Large bowel obstructio    Hospital Course: Patient was admitted to the hospital on 8/24/2022 with a refractory large bowel obstruction, the details of which can be found in the history and physical.    She was started on IV fluids. Pain control was given. She was scheduled for an elective resection the following day which was kept on the schedule. Given her refractory large bowel obstruction, I elected to marker preoperatively and proceed with the surgery. The following day she was taken to the operating room and had a sigmoid resection with diverting colostomy along with a partial bowel resection and a total abdominal hysterectomy with bilateral salpingo-oophorectomy. He had a ureteral stent and repair on the left as well. The details of this operation can be found in the multiple operative reports. She was taken to the ICU postoperatively. She did not require pressor medication. Nasogastric tube was continued for the next 48 hours. Her colostomy worked quickly. The hospitalist were consulted along with the intensivist.    On postop day #2 she was transferred to the floor. She was started on clear liquid diet once her nasogastric tube was removed. She had ostomy education from her ostomy nurse. Potassium was repleted. She was advanced to a low fiber diet prior to discharge. Her Rothman catheter will be kept in place for 1 week additional following discharge. Her HO drain was removed on postop day #5.     Patient had an epidural in place for the first 4 postoperative days and then was given traditional pain medication using p.o. and IV for breakthrough pain. Patient ambulated early and often was given DVT prophylaxis through her hospital stay. She did require 2 units of packed red blood cells intraoperatively and did have acute blood loss anemia postoperatively. This was managed without problems and she did not require any additional transfusion. On postop day #5, she was tolerating a diet. Her colostomy was working. She received excellent education regarding continued ostomy care. Wound care activity and medication was described at the bedside. Urology care was discussed at the bedside as well. She will see me in the office next week for postop check. Histology had not returned to date. All questions answered. Consults: pulmonary/intensive care and hospitalist, wound and ostomy nurse    Significant Diagnostic Studies: labs: CBC, CMP    Treatments: antibiotics: Zosyn and surgery: Sigmoid resection with diverting colostomy, enterectomy, total abdominal hysterectomy and bilateral salpingo-oophorectomy, cystoscopy with left ureteral stent    Discharge Exam:  See exam dated 8/31/2022    Disposition: home    In process/preliminary results:  Outstanding Order Results       Date and Time Order Name Status Description    8/25/2022 11:46 AM PREPARE PLASMA In process             Patient Instructions:   Discharge Medication List as of 8/31/2022  2:52 PM        START taking these medications    Details   oxyCODONE-acetaminophen (PERCOCET) 5-325 MG per tablet Take 1 tablet by mouth every 6 hours as needed for Pain for up to 3 days. , Disp-12 tablet, R-0Print           CONTINUE these medications which have NOT CHANGED    Details   Multiple Vitamins-Minerals (THERAPEUTIC MULTIVITAMIN-MINERALS) tablet Take 1 tablet by mouth dailyHistorical Med           Activity: activity as tolerated  Diet: regular diet  Wound Care: keep wound clean and dry    Follow-up with Tierney Anton in 5 days.    Signed:  Jesus Elise  8/31/2022  5:25 PM

## 2022-08-31 NOTE — DISCHARGE INSTRUCTIONS
May shower    Instructions on Rothman catheter maintenance please    Dressing over incision while it heals for any drainage    No driving while on pain medication    No lifting greater than 10 pounds for the next 2 weeks

## 2022-08-31 NOTE — PROGRESS NOTES
Pt Name: Mehdi Mendoza  Medical Record Number: [id number]  Date of Birth 1965   Admit date 2022  1:48 PM  Today's Date: 2022     ASSESSMENT  1. Hospital day # 7  2 postop day #6 sigmoid resection with diverting colostomy  3. Tolerating diet/ambulating/pain controlled    PLAN  1. Discharge home  2. Office next week    SUBJECTIVE  Chief complaint: Follow-up colectomy  Afebrile, vital signs are stable. She denies any nausea or vomiting, ostomy working well She is tolerating a ADULT DIET; Regular; Low Fiber  ADULT ORAL NUTRITION SUPPLEMENT; Breakfast, Lunch, Dinner; Standard High Calorie/High Protein Oral Supplement. Her pain is well controlled on current medications. She has been ambulating in the halls. has no past medical history on file. CURRENT MEDS  Scheduled Meds:   pantoprazole (PROTONIX) 40 mg injection  40 mg IntraVENous Daily    sodium chloride flush  5-40 mL IntraVENous 2 times per day    sodium chloride flush  5-40 mL IntraVENous 2 times per day    sodium chloride flush  5-40 mL IntraVENous 2 times per day     Continuous Infusions:   sodium chloride      sodium chloride      sodium chloride       PRN Meds:.acetaminophen, oxyCODONE-acetaminophen **OR** oxyCODONE-acetaminophen, morphine **OR** morphine, calcium carbonate, hydrOXYzine HCl, ketorolac, sodium chloride, naloxone, sodium chloride flush, sodium chloride, sodium chloride flush, sodium chloride, ondansetron **OR** ondansetron, sodium chloride flush, sodium chloride, nicotine    OBJECTIVE  CURRENT VITALS:  height is 5' 5\" (1.651 m) and weight is 148 lb 8 oz (67.4 kg). Her oral temperature is 98.2 °F (36.8 °C). Her blood pressure is 125/68 and her pulse is 84. Her respiration is 18 and oxygen saturation is 99%.    Temperature Range (24h):Temp: 98.2 °F (36.8 °C) Temp  Av.6 °F (37 °C)  Min: 98.2 °F (36.8 °C)  Max: 99.1 °F (37.3 °C)  BP Range (51T): Systolic (91LIS), UOB:338 , Min:110 , CEK:820     Diastolic (89RSL), SRY:93, Min:61, Max:68    Pulse Range (24h): Pulse  Av.3  Min: 82  Max: 96  Respiration Range (24h): Resp  Av.7  Min: 16  Max: 18    GENERAL: alert, no distress  LUNGS: clear to ausculation, without wheezes, rales or rhonci  HEART: normal rate and regular rhythm  ABDOMEN: soft, non-tender, non-distended, incision looks fine without signs of infection, bowel sounds present in all 4 quadrants, and no guarding or peritoneal signs  EXTERMITY: no cyanosis, clubbing or edema  In: 822.3 [P.O.:470; I.V.:237]  Out: 2480 [Urine:2200]  Date 22 - 22   Shift 2993-2459 1464-7681 1349-4052 24 Hour Total   INTAKE   Shift Total(mL/kg)       OUTPUT   Urine(mL/kg/hr) 250(0.5) 550  800   Emesis/NG output(mL/kg)  100(1.5)  100(1.5)   Stool(mL/kg) 10(0.1) 150(2.2)  160(2.4)   Shift Total(mL/kg) 260(3.9) 800(11.9)  1060(15.7)   Weight (kg) 67.4 67.4 67.4 67.4       LABS  Recent Labs     22  0550 22  0546 22  0558   WBC 8.1 8.1 11.8*   HGB 9.2* 9.8* 10.0*   HCT 27.4* 28.8* 29.8*    291 368    141 139   K 3.7 3.5 3.7   * 104 102   CO2 24 25 25   BUN 5* 9 11   CREATININE 0.37* 0.36* 0.38*   MG 1.7  --   --    PHOS 3.5 3.9 3.5   CALCIUM 8.6 9.1 9.2      No results for input(s): PTT, INR in the last 72 hours. Invalid input(s): PT  No results for input(s): AST, ALT, BILITOT, BILIDIR, AMYLASE, LIPASE, LDH, LACTA in the last 72 hours. RADIOLOGY  CT ABDOMEN PELVIS W IV CONTRAST Additional Contrast? None    Result Date: 2022  CT ABDOMEN PELVIS W IV CONTRAST HISTORY:   Diffuse abdominal pain, worse in the left lower quadrant. Nausea. Constipation. Bloating. History of diverticulitis. TECHNIQUE: CT of the abdomen and pelvis was performed using standard technique, scanning from just above the dome of the diaphragm to the symphysis pubis. Sagittal and coronal performed. Contrast: IV: 50 ml Isovue 300 Oral:  None.  All CT scans at this facility use dose modulation, iterative reconstruction, and/or weight based dosing when appropriate to reduce radiation dose to as low as reasonably achievable. COMPARISON: CT 11/9/2016. RESULT: Liver: No mass or lesion. Biliary: Gallbladder unremarkable. No bile duct dilation. Pancreas: No mass or duct dilation. Spleen: No mass or splenomegaly. Adrenals: No mass. Kidneys: No calculus or hydronephrosis. No suspicious renal lesions. GI tract: Extensive feces throughout the colon. Focal area of masslike wall thickening (\"apple core lesion\") within the distal sigmoid colon within the pelvis suspicious for obstructing neoplasm, with associated distention of the upstream sigmoid, descending, transverse, and ascending colon. Small bowel normal caliber. Moderate hiatal hernia. Diverticulosis without evidence for acute diverticulitis. Lymph nodes: No abdominal or pelvic lymphadenopathy. Mesentery/Peritoneum/Retroperitoneum: No ascites or mass. Vasculature: The celiac axis and SMA are patent. The portal vein and branches, splenic vein, SMV, and hepatic veins are patent. Mild arterial atherosclerotic disease without aneurysm. Pelvis: No significant free fluid. Uterus grossly unremarkable. Bladder decompressed. See above for colonic findings. Bones: No acute osseous findings. No destructive osseous lesions. Soft tissues: Unremarkable. Lower thorax: Unremarkable. Findings suspicious for obstructing colonic neoplasm in the distal sigmoid colon. Further evaluation with colonoscopy is suggested. ==========     XR CHEST PORTABLE    Result Date: 8/17/2022  EXAMINATION: XR CHEST PORTABLE CLINICAL HISTORY: 78year-old with epigastric pain radiating to the chest COMPARISONS: None available. FINDINGS: Portable frontal view of the chest was obtained at 13:12 hours. Cardiac and mediastinal contours are within normal limits. Pulmonary vascularity is not distended. No large airspace consolidations, pleural effusions or pneumothoraces. Visualized  bones are intact.      NO RADIOGRAPHIC FINDINGS OF ACUTE CARDIOPULMONARY DISEASE ON PORTABLE PLAIN FILM    Electronically signed by Génesis Campoverde MD on 8/31/2022 at 2:00 PM

## 2022-08-31 NOTE — PROGRESS NOTES
Hospitalist Progress Note      PCP: Shane Galvan MD    Date of Admission: 8/24/2022    Chief Complaint:  no acute events, afebrile, stable HD, on RA, not eating well, tolerating fluids per nursing staff    Medications:  Reviewed    Infusion Medications    sodium chloride      sodium chloride      sodium chloride       Scheduled Medications    pantoprazole (PROTONIX) 40 mg injection  40 mg IntraVENous Daily    sodium chloride flush  5-40 mL IntraVENous 2 times per day    sodium chloride flush  5-40 mL IntraVENous 2 times per day    sodium chloride flush  5-40 mL IntraVENous 2 times per day     PRN Meds: acetaminophen, oxyCODONE-acetaminophen **OR** oxyCODONE-acetaminophen, morphine **OR** morphine, calcium carbonate, hydrOXYzine HCl, ketorolac, sodium chloride, naloxone, sodium chloride flush, sodium chloride, sodium chloride flush, sodium chloride, ondansetron **OR** ondansetron, sodium chloride flush, sodium chloride, nicotine      Intake/Output Summary (Last 24 hours) at 8/31/2022 1027  Last data filed at 8/31/2022 0953  Gross per 24 hour   Intake 235 ml   Output 1780 ml   Net -1545 ml         Exam:    /68   Pulse 84   Temp 98.2 °F (36.8 °C) (Oral)   Resp 18   Ht 5' 5\" (1.651 m)   Wt 148 lb 8 oz (67.4 kg)   LMP  (LMP Unknown)   SpO2 99%   BMI 24.71 kg/m²     General appearance: appears stated age and cooperative. Respiratory:  clear to auscultation, bilaterally  Cardiovascular: Regular rate and rhythm, S1/S2 . Abdomen: colostomy bag present, active bowel sounds. Musculoskeletal: No edema bilaterally.       Labs:   Recent Labs     08/29/22  0550 08/30/22  0546 08/31/22  0558   WBC 8.1 8.1 11.8*   HGB 9.2* 9.8* 10.0*   HCT 27.4* 28.8* 29.8*    291 368       Recent Labs     08/29/22  0550 08/30/22  0546 08/31/22  0558    141 139   K 3.7 3.5 3.7   * 104 102   CO2 24 25 25   BUN 5* 9 11   CREATININE 0.37* 0.36* 0.38*   CALCIUM 8.6 9.1 9.2   PHOS 3.5 3.9 3.5       No results for input(s): AST, ALT, BILIDIR, BILITOT, ALKPHOS in the last 72 hours. No results for input(s): INR in the last 72 hours. No results for input(s): Venus Siegel in the last 72 hours. 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   - s/p ex lap with rectosigmoid resection, total abdominal hysterectomy and bilateral salpingo-oophorectomy, enterectomy, left ureteral repair, and end colostomy on 8/25  - postoperative pain control, DVT prophylaxis, antibiotics, diet, activity, wound care per primary service    Acute blood loss anemia  - s/p PRBC transfusion  - follow H/H    Leukocytosis   - likely reactive, improved    Hypokalemia / hypophophatemia  - replaced        Diet: ADULT DIET;  Regular; Low Fiber  ADULT ORAL NUTRITION SUPPLEMENT; Breakfast, Lunch, Dinner; Standard High Calorie/High Protein Oral Supplement    Code Status: Full Code            Electronically signed by Jeremi Bar MD on 8/31/2022 at 10:27 AM

## 2022-08-31 NOTE — CARE COORDINATION
This LSW met with patient at bedside today. Patient is anticipating d/c home today, 8/31/2022. Patient will be followed by [facility]. I notifed Donna Draper at [facility] of potential d/c home today.  will provide transport home.   Electronically signed by CHANG Guzman, LSW on 8/31/22 at 10:10 AM EDT

## 2022-08-31 NOTE — PROGRESS NOTES
Spiritual Care Services     Summary of Visit:  Pt engaged in life review, shared things that bring her judy like her many pets. Longing to be home again and feels she may heal better at home than she has here. Difficult to sleep at night, difficult for her to accept help from others, including family. Encouraged her to do so. Healing Touch treatment provided. Spiritual Assessment/Intervention/Outcomes:    Encounter Summary  Encounter Overview/Reason : Spiritual/Emotional Needs  Service Provided For[de-identified] Patient  Referral/Consult From[de-identified] Patient  Support System: Spouse, Children  Last Encounter : 08/31/22  Complexity of Encounter: Moderate  Begin Time: 1300  End Time : 1325  Total Time Calculated: 25 min  Encounter   Type: Follow up     Spiritual/Emotional needs  Type: Spiritual Distress, Emotional Distress  Rituals, Rites and Sacraments  Type: Anointing  Grief, Loss, and Adjustments  Type: Adjustment to illness              Values / Beliefs  Do You Have Any Ethnic, Cultural, Sacramental, or Spiritual Taoist Needs You Would Like Us To Be Aware of While You Are in the Hospital : No    Care Plan:    Support through discharge home. Spiritual Care Services   Electronically signed by Robe Sandeep on 8/31/22 at 2:53 PM EDT     To reach a  for emotional and spiritual support, place an [facility] consult request.   If a  is needed immediately, dial 0 and ask to page the on-call .

## 2022-08-31 NOTE — FLOWSHEET NOTE
2201: Assessment completed. VS WNL. Abd sounds active; Drsg. Clean, dry, intact; Pt had approximately 20ml greenish brown loose stool from colostomy-This RN observe pt dispose of stool from ostomy bag appropriately; stoma pink, protrude. Rothman patent and strap to leg-yellow, cloudy urine noted. Pt IV became occluded but pt refuse IV insertion attempt-states her vein blows easily and that she had lots of previous attempt and she is done with IV insertions. Pt c/o Abd pain , back pain, heartburn and nausea. Request PRN Tums and refuse Percocet-she state percocet make her nausea, but pt refuse RN offer to give PO Zofran with Percocet. Pt also request Atarax for anxiety. Standard safety precaution in place    0500: Pt attempt another stool disposal from ostomy bag-Pt tearful, states she doesn't think she can do this, \"I don't want to put my fairly through this\" \"this sis embarrassing\"-This RN reassure pt and inform her that she is doing good and will gt better at changing bag eventually.

## 2022-08-31 NOTE — PROGRESS NOTES
Pt assessed. Medicated per plan of care. C/o of pain but did not want percocet. MD made aware and tylenol was given. C/o nausea and prn medication administered. Up with assist via walker.

## 2022-08-31 NOTE — DISCHARGE INSTR - COC
Continuity of Care Form    Patient Name: Azam Devine   :  1965  MRN:  [id number]  /  Alisha Coy date:  2022  Discharge date:  2022    Code Status Order: Full Code   Advance Directives:   [facility] Documentation       Date/Time Healthcare Directive Type of Healthcare Directive Copy in [address] Agent's Name Healthcare Agent's Phone Number    22 [phone] No, patient does not have an advance directive for healthcare treatment -- -- -- -- --            Admitting Physician:  Jasvir Rosales MD  PCP: Juanito Stern MD    Discharging Nurse: [facility] Unit/Room#: G161/K058-52  Discharging Unit Phone Number: [phone]    Emergency Contact:   Extended Emergency Contact Information  Primary Emergency Contact: Azar Kern   [address] Phone: [phone]  Relation: Spouse    Past Surgical History:  Past Surgical History:   Procedure Laterality Date    COLECTOMY N/A 2022    SIGMOIDECTOMY WITH ABDOMINAL HYSTERECTOMY WITH BSO, END COLOSTOMY, ENTERECTOMY, CYSTOSCOPY WITH URETERAL STENT PLACEMENT performed by Jasvir Rosales MD at [facility]       Immunization History: There is no immunization history on file for this patient.     Active Problems:  Patient Active Problem List   Diagnosis Code    Intestinal obstruction ([facility] [address] K56.609    Diverticular disease K57.90    Ureteral transection of left native kidney, initial encounter S37.10XA       Isolation/Infection:   Isolation            No Isolation          Patient Infection Status       None to display            Nurse Assessment:  Last Vital Signs: /68   Pulse 84   Temp 98.2 °F (36.8 °C) (Oral)   Resp 18   Ht 5' 5\" (1.651 m)   Wt 148 lb 8 oz (67.4 kg)   LMP  (LMP Unknown)   SpO2 99%   BMI 24.71 kg/m²     Last documented pain score (0-10 scale): Pain Level: 8  Last Weight:   Wt Readings from Last 1 Encounters:   22 148 lb 8 oz (67.4 kg) None    Nutrition Therapy:  Current Nutrition Therapy:   - Oral Diet:  General and Low Fiber  - Oral Nutrition Supplement:  Standard  three times a day    Routes of Feeding: Oral  Liquids: No Restrictions  Daily Fluid Restriction: no  Last Modified Barium Swallow with Video (Video Swallowing Test): not done    Treatments at the Time of Hospital Discharge:   Respiratory Treatments: ***  Oxygen Therapy:  is not on home oxygen therapy. Ventilator:    - No ventilator support    Rehab Therapies: Physical Therapy and Occupational Therapy  Weight Bearing Status/Restrictions: No weight bearing restrictions  Other Medical Equipment (for information only, NOT a DME order):  walker  Other Treatments: ***    Patient's personal belongings (please select all that are sent with patient):  None    RN SIGNATURE:  Electronically signed by Ana M Miranda RN on 8/31/22 at 11:02 AM EDT    CASE MANAGEMENT/SOCIAL WORK SECTION    Inpatient Status Date: ***    Readmission Risk Assessment Score:  Readmission Risk              Risk of Unplanned Readmission:  8           Discharging to Facility/ Agency   Name:   Address:  Phone:  Fax:    Dialysis Facility (if applicable)   Name:  Address:  Dialysis Schedule:  Phone:  Fax:    / signature: {Esignature:[id number]}    PHYSICIAN SECTION    Prognosis: {Prognosis:0901839179}    Condition at Discharge: Yfn Mancilla Patient Condition:973193168}    Rehab Potential (if transferring to Rehab): {Prognosis:3265434129}    Recommended Labs or Other Treatments After Discharge: ***    Physician Certification: I certify the above information and transfer of Era Rojas  is necessary for the continuing treatment of the diagnosis listed and that she requires {Admit to Appropriate Level of Care:38620} for {GREATER/LESS:741267385} 30 days.      Update Admission H&P: {CHP DME Changes in QGIIA:241459759}    PHYSICIAN SIGNATURE:  {Esignature:[id number]}

## 2022-08-31 NOTE — PROGRESS NOTES
Wound Ostomy Continence Nurse  Ostomy Referral Follow-up Progress Note      NAME:  Abbe Schwab  MEDICAL RECORD NUMBER:  [id number]  AGE: 61 y.o. GENDER:  female  :  1965  TODAY'S DATE:  2022    Subjective:    Abbe Schwab is a 61 y.o. female referred by:   [x] Physician  [] Nursing  [] Other:     End colostomy and New    Objective    /68   Pulse 84   Temp 98.2 °F (36.8 °C) (Oral)   Resp 18   Ht 5' 5\" (1.651 m)   Wt 148 lb 8 oz (67.4 kg)   LMP  (LMP Unknown)   SpO2 99%   BMI 24.71 kg/m²     Hilton Risk Score Hilton Scale Score: 19    Assessment   Surgeon - Dr. James Reusing    Colostomy      Colostomy LUQ (Active)   Stomal Appliance Changed;1 piece 22   Flange Size (inches) 2 Inches 22   Stoma  Assessment Red;Moist;Protrudes 22   Peristomal Assessment Clean; Intact 22   Treatment Bag change;Site care 22   Stool Appearance Soft 22   Stool Color Isabel Files 22   Stool Amount Medium 22   Output (mL) 100 ml 22   Number of days: 5     Patient in bed, about to get up for a walk in the halls with . Patient is feeling well today, reports that she emptied her bag 3x with supervision of nursing staff. Patient is feeling confident and is eager to go home. Appliance changed at this time for demonstration purpose with patient's  present. Stomal edema has significantly decreased since last appliance change. Stoma is red, moist and protrudes. Mucocutaneous junction is intact. Stoma is functioning well, medium amounts of soft brown stools in the appliance. Site care performed with warm water and 4x4s, new one piece flat appliance with barrier ring applied at this time. Patient and patient's  verbalize understanding, but would benefit from continued coaching with Jeff Juarez at home. Supplies and patient specific supplies list provided.  Samples requested to be sent to patient's home, with verbal permission. All questions answered, no other needs verbalized at this time. Intake/Output Summary (Last 24 hours) at 8/31/2022 0945  Last data filed at 8/31/2022 7807  Gross per 24 hour   Intake 235 ml   Output 1530 ml   Net -1295 ml       Plan:   Plan for Ostomy Care: See above        Ostomy Plan of Care  [x] Supplies/Instructions left in room  [] Patient using home supplies  [x] Brand/supplies at bedside- Coloplast Sensura one-piece appliance    Current Diet: ADULT DIET;  Regular; Low Fiber  ADULT ORAL NUTRITION SUPPLEMENT; Breakfast, Lunch, Dinner; Standard High Calorie/High Protein Oral Supplement  Dietician consult:  Yes    Discharge Plan:  Placement for patient upon discharge: home with support    Outpatient visit plan No  Supplies given Yes   Samples requested Yes    Referrals:  []   [] [facility]  [] Supplies  [] Other    Patient/Caregiver Teaching:  Written Instructions given to patient/family- Patient and patient's  Juan Daniel Dudley provided:  [] Reviewed GI and A&P        [x] Supplies  [x] Pouch emptying      [] Manipulate closure  [x] Routine Care         [] Comment  [x] Pouch maintenance           Level of patient/caregiver understanding able to:  [] Indicates understanding       [] Needs reinforcement  [] Unsuccessful      [x] Verbal Understanding  [] Demonstrated understanding       [] No evidence of learning  [] Refused teaching         [] N/A    Electronically signed by ANNABEL NiñoN, RN, CWOCN on 8/31/2022 at 9:53 AM

## 2022-09-02 NOTE — ADT AUTH CERT
Patient Demographics    Name Patient ID SSN Gender Identity Birth Date   Evelia Fuchs [id number]  Female 63 (61 yrs)     Address Phone Email Employer    [address]   Edward Martinez [phone] (J)   [phone] (M) -- [phone] Race Occupation Emp Status    -- White (non-) -- Not Employed      Reg Status PCP Date Last Verified Next Review Date    Verified Rajan Jonesey, [place]  [phone] 22      Admission Date Discharge Date Admitting Provider     22 Nikole Jensen MD       Marital Status Rastafarian       [facility] [place] [address]        Emergency Contact 1   Georgette Payne (2)   [place]   [phone] [address] [de-identified]  CVG Subscriber Name/Sex/Relation Subscriber  Subscriber Address/Phone Subscriber Emp/Emp Phone   1.  [facility]   [id number] [address] [place] [address]   (Spouse)  [address]   Dilan Chand    [phone](L)      Utilization Reviews       Intestinal Obstruction - Care Day 7 (2022) by Neftali Camp RN       Review Status Review Entered   Completed 2022 14:29      Criteria Review      Care Day: 7 Care Date: 2022 Level of Care: Intermediate Care    Guideline Day 2    Level Of Care    (X) Floor    2022 2:29 PM EDT by Piyush Hyatt      intermediate care unit   tele ordered    Clinical Status    (X) * Hypotension absent    2022 2:29 PM EDT by Tisha Hwang      99.1, 96, tele sr, resp 18, 106/71, pulse ox 96 % on RA    (X) * Nausea and vomiting absent or improved    (X) * Pain absent or managed    (X) * Abdominal exam stable or improved    Activity    (X) Activity as tolerated    Routes    (X) IV fluids    (X) IV medications    2022 2:29 PM EDT by Piyush Hyatt      iv protonix 40 mg daily   iv zosyn 3.375 gm iv q8h   iv saline locked   prn tums 500 mg po x2 , atarax 10 mg po x2, iv toradol 30 mg x1 IV zofran 4 mg x1    (X) Diet as tolerated    Interventions    (X) * NG tube absent * Milestone   Additional Notes   Date: 8/30/2022  care day 2       PERTINENT UPDATES:   She denies any nausea or vomiting, colostomy working She is tolerating a ADULT DIET; Regular; Low Fiber   ADULT ORAL NUTRITION SUPPLEMENT; Breakfast, Lunch, Dinner; Standard High Calorie/High Protein Oral Supplement. Her pain is well controlled on current medications. She has been ambulating in the halls. ABNL/PERTINENT LABS/RADIOLOGY/DIAGNOSTIC STUDIES:   Creat 0.36   Gluc 111   Rbc 3.34   H/h 9.8/ 28.8   Rdw 15       PHYSICAL EXAM:   ABDOMEN: non-distended, colostomy looks fine, bowel sounds present in all 4 quadrants, and no guarding or peritoneal signs      MD CONSULTS/ASSESSMENT AND PLAN:   Medical impression    ASSESSMENT   1. Hospital day # 6   2 postop day #5 sigmoid resection with diverting colostomy       PLAN   1. Ostomy education   2. DC Zosyn   3. 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    - s/p ex lap with rectosigmoid resection, total abdominal hysterectomy and bilateral salpingo-oophorectomy, enterectomy, left ureteral repair, and end colostomy on 8/25   - postoperative pain control, DVT prophylaxis, antibiotics, diet, activity, wound care per primary service       Acute blood loss anemia   - s/p PRBC transfusion   - follow H/H       Leukocytosis    - likely reactive, improved       Hypokalemia / hypophophatemia   - replaced         MEDICATIONS:   Charted in MCG       ORDERS:   ADULT ORAL NUTRITION SUPPLEMENT; Breakfast, Lunch, Dinner; Standard High Calorie/High Protein Oral Supplement    ADULT ORAL NUTRITION SUPPLEMENT; Breakfast, Lunch, Dinner; Clear Liquid Oral Supplement    ADULT DIET;  Regular; Low Fiber    Place sequential compression device    indwelling urinary catheter    Up in chair    Up as tolerated    Diet NPO          PT/OT/SLP/CM ASSESSMENT OR NOTES:    8/30  LSW meet with pt at bedside. Discuss DC plan with pt. Pt agree with DC plan home with [facility]. Pt denies any needs at this time. LSW will follow. Intestinal Obstruction - Care Day 6 (8/29/2022) by Anat Szymanski RN       Review Status Review Entered   Completed 9/1/2022 14:21      Criteria Review      Care Day: 6 Care Date: 8/29/2022 Level of Care: Intermediate Care    Guideline Day 2    Level Of Care    ( ) Floor    9/1/2022 2:21 PM EDT by Tony Hwang      intermediate care unit   tele ordred    Clinical Status    (X) * Hypotension absent    9/1/2022 2:21 PM EDT by Tisha Hwang      98.4, 92, tele sr, resp 16, bp 140/80, pox 93 % on RA    ( ) * Nausea and vomiting absent or improved    9/1/2022 2:21 PM EDT by Renzo Castle      remains on oral and IV zofran as charted    ( ) * Pain absent or managed    9/1/2022 2:21 PM EDT by Renzo Castle      remains on Epidrual    (X) * Abdominal exam stable or improved    Activity    (X) Activity as tolerated    Routes    (X) IV fluids    (X) IV medications    9/1/2022 2:21 PM EDT by Tony Hwang      iv protonix 40 mg iv x1   iv zosyn 3.375 gm iv q8h   iv sodium phosphate 20 mmol iv x 1  iv d5 LT at 60 cc/hr  Epidural fentaNYL (SUBLIMAZE) 6 ml/hr   prn tums 500 mg po x1 , atarax 10 mg po 1 , zofran 4 mg po x1, iv zofran 4 mg x1    (X) Diet as tolerated    Interventions    (X) * NG tube absent    * Milestone   Additional Notes   DATE: 8/29/2022  care day 6          PERTINENT UPDATES:   Remains on IVF, meds and epidural         ABNL/PERTINENT LABS/RADIOLOGY/DIAGNOSTIC STUDIES:   Cl 108   Bun 5   Creat 0.37   Gluc 114   Alb 2.8   Rbc 3.15   H/h 9.2/ 27.4    Rdw 14.8          PHYSICAL EXAM:   Abdomen: colostomy bag present, active bowel sounds.       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    - s/p ex lap with rectosigmoid resection, total abdominal hysterectomy and bilateral salpingo-oophorectomy, enterectomy, left ureteral repair, and end colostomy on 8/25   - postoperative pain control, DVT prophylaxis, antibiotics, diet, activity, wound care per primary service       Acute blood loss anemia   - s/p PRBC transfusion   - follow H/H       Leukocytosis    - likely reactive, improved       Hypokalemia / hypophophatemia   - replaced            Diet: ADULT DIET; Regular; Low Fiber   ADULT ORAL NUTRITION SUPPLEMENT; Breakfast, Lunch, Dinner; Clear Liquid Oral Supplement       Code Status: Full Code       Surgical impression    ASSESSMENT   1. Hospital day # 5   2 postop day #4 partial colectomy/colostomy           PLAN   1.  Leave Rothman catheter in for 6 more days. She will need to have education regarding home management   2. Advance diet   3. Stop epidural   4. Traditional pain medication   5. Hep-Lock IV   6.  DC HO drain         MEDICATIONS:   Charted in MCG       ORDERS:   ADULT ORAL NUTRITION SUPPLEMENT; Breakfast, Lunch, Dinner; Standard High Calorie/High Protein Oral Supplement    ADULT ORAL NUTRITION SUPPLEMENT; Breakfast, Lunch, Dinner; Clear Liquid Oral Supplement    ADULT DIET; Regular; Low Fiber    Place sequential compression device    indwelling urinary catheter    Up in chair    Up as tolerated    Diet NPO             PT/OT/SLP/CM ASSESSMENT OR NOTES:   LESA 8/29  D[facility]C PLAN ; [facility] AT [facility] NOTIFIED TODAY ,  OF PROBABLE REFERRAL FOR NEW COLOSTOMY AND POSSIBLE IV ATB.               Intestinal Obstruction - Care Day 4 (8/27/2022) by Lashon Cedillo, RN       Review Status Review Entered   Completed 9/1/2022 14:08      Criteria Review      Care Day: 4 Care Date: 8/27/2022 Level of Care: ICU    Guideline Day 2    Level Of Care    ( ) Floor    9/1/2022 2:08 PM EDT by Frantz Fuller      ICU this day    Clinical Status    (X) * Hypotension absent    9/1/2022 2:08 PM EDT by Tisha Hwang      99, 92, 15, 118/56, pulse ox 100 % on ra    ( ) * Nausea and vomiting absent or improved    9/1/2022 2:08 PM EDT by Frantz Fuller      remains NPO    ( ) * Pain absent or managed    9/1/2022 2:08 PM EDT by Frantz Fuller      remains on iv pain meds, Epidural in place    ( ) * Abdominal exam stable or improved    9/1/2022 2:08 PM EDT by Jj Hwang      ABD:  bowel sounds normal, soft,tender to touch colostomy    Activity    (X) Activity as tolerated    Routes    (X) IV fluids    (X) IV medications    9/1/2022 2:08 PM EDT by Jj Hwang      iv albumin 25 gm iv q8h   iv protonix 40 mg iv daily   iv zosyn 3.375 gm iv q8h   iv kcl 10 meq iv x4  IV d5 LR at 60 cc/hr   EPIDURAL  fentnayl GTT at 6 ml/hr   PRN iv fentnayl 25 mcg iv x1 , iv toradol 30 mg iv x1 , iv zofran 4 mg x4    ( ) Diet as tolerated    9/1/2022 2:08 PM EDT by Jj Hwang      npo    Interventions    ( ) * NG tube absent    * Milestone   Additional Notes   DATE 8/27/2022  care day 4       PERTINENT UPDATES:   Remains in ICU   Temp 99 this day    NG remains    Epidural remains   Iv meds as charted in MCG       ABNL/PERTINENT LABS/RADIOLOGY/DIAGNOSTIC STUDIES:   Bun 5   Anion gap 8   Ca 8.4   Phos 1.9   Wbc 12.3   Rbc 2.96   H/h 8.7/ 25.2   Rdw 15.4   Neut 10.6         PHYSICAL EXAM:   ABDOMEN: non-distended, HO serosanguineous, colostomy functioning, bowel sounds present in all 4 quadrants, and no guarding or peritoneal signs      MD CONSULTS/ASSESSMENT AND PLAN:   Surgical impression   ASSESSMENT   1. Hospital day # 3   2 postop day #2 en bloc rectosigmoid/BILLIE/BSO/enterectomy for high-grade obstruction to suspected malignancy       PLAN   1. Clamp NG tube   2.   Out of bed      ICU md impression   This is a critically ill patient at risk of deterioration / death , needing close ICU monitoring and intervention due to below noted problems    · Status post op day 1 myectomy, total abdominal hysterectomy, BSO, with debulking   · Acute blood loss anemia due to above, improved   · Leukocytosis   · Left ureter injury status post stent and surgical repair intraoperatively       Recommendations   · Up to chair today   · Pulmonary hygiene   · Clamp NG per surgeon's recommendations   · Okay to start clear liquids   · Monitor urine output and avoid overload   · Continue antibiotics   · Maintain blood sugar 140-180   · DVT prophylaxis SCDs   · PUD prophylaxis   · Epidural for pain   · Continue IV fluids until taking adequate p.o. 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 colon    - s/p ex lap with rectosigmoid resection, total abdominal hysterectomy and bilateral salpingo-oophorectomy, enterectomy, left ureteral repair, and end colostomy on 8/25   - postoperative pain control, DVT prophylaxis, antibiotics, diet, activity, wound care per primary service       Shock    - likely multifactorial in the setting of acute blood loss, possibly septic   - s/p PRBC transfusion   - on IV Zosyn   - management per critical care and primary service   - follow H/H       Leukocytosis    - likely reactive, follow trend            Diet: Diet NPO Exceptions are: Sips of Clear Liquids, Popsicles, Ice Chips       Code Status: Full Code          ORDERS:   Place sequential compression device    indwelling urinary catheter    Diet NPO Exceptions are: Sips of Clear Liquids, Popsicles, Ice Chips    Ambulate patient - Evening of surgery    Up in chair    Up as tolerated    Nasogastric Tube Insertion    Diet NPO          PT/OT/SLP/CM ASSESSMENT OR NOTES:   CM 8/27 PT REMAINS IN ICU.  NG CLAMPED TODAY

## 2022-09-02 NOTE — ADT AUTH CERT
Patient Demographics    Name Patient ID SSN Gender Identity Birth Date   Josy Gilbertllor [id number]  Female 63 (61 yrs)     Address Phone Email Employer    [address]   Lamont Frank [phone] (G)   [phone] (M) -- [address] Race Occupation Emp Status    -- White (non-) -- Not Employed      Reg Status PCP Date Last Verified Next Review Date    Verified Garry Scott, [place]  [phone] 22      Admission Date Discharge Date Admitting Provider     22 Estee Rosas MD       Marital Status Synagogue       [place] [address]        Emergency Contact 1   David Monte (2)   [place]   [phone] Philomena Kehr)     [address] [facility] [de-identified]  CVG Subscriber Name/Sex/Relation Subscriber  Subscriber Address/Phone Subscriber Emp/Emp Phone   1.  Vidya Booker   [id number] [address] [facility]   (Spouse)  [address]   Dilan Chand    [phone](J)      Utilization Reviews       Intestinal Obstruction - Care Day 7 (2022) by Alexandra Silva RN       Review Status Review Entered   Completed 2022 14:29      Criteria Review      Care Day: 7 Care Date: 2022 Level of Care: Intermediate Care    Guideline Day 2    Level Of Care    (X) Floor    2022 2:29 PM EDT by Michael Baumann      intermediate care unit   tele ordered    Clinical Status    (X) * Hypotension absent    2022 2:29 PM EDT by Tisha Hwang      99.1, 96, tele sr, resp 18, 106/71, pulse ox 96 % on RA    (X) * Nausea and vomiting absent or improved    (X) * Pain absent or managed    (X) * Abdominal exam stable or improved    Activity    (X) Activity as tolerated    Routes    (X) IV fluids    (X) IV medications    2022 2:29 PM EDT by Michael Baumann      iv protonix 40 mg daily   iv zosyn 3.375 gm iv q8h   iv saline locked   prn tums 500 mg po x2 , atarax 10 mg po x2, iv toradol 30 mg x1 IV zofran 4 mg x1    (X) Diet as tolerated    Interventions    (X) * NG tube absent * Milestone   Additional Notes   Date: 8/30/2022  care day 2       PERTINENT UPDATES:   She denies any nausea or vomiting, colostomy working She is tolerating a ADULT DIET; Regular; Low Fiber   ADULT ORAL NUTRITION SUPPLEMENT; Breakfast, Lunch, Dinner; Standard High Calorie/High Protein Oral Supplement. Her pain is well controlled on current medications. She has been ambulating in the halls. ABNL/PERTINENT LABS/RADIOLOGY/DIAGNOSTIC STUDIES:   Creat 0.36   Gluc 111   Rbc 3.34   H/h 9.8/ 28.8   Rdw 15       PHYSICAL EXAM:   ABDOMEN: non-distended, colostomy looks fine, bowel sounds present in all 4 quadrants, and no guarding or peritoneal signs      MD CONSULTS/ASSESSMENT AND PLAN:   Medical impression    ASSESSMENT   1. Hospital day # 6   2 postop day #5 sigmoid resection with diverting colostomy       PLAN   1. Ostomy education   2. DC Zosyn   3. 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    - s/p ex lap with rectosigmoid resection, total abdominal hysterectomy and bilateral salpingo-oophorectomy, enterectomy, left ureteral repair, and end colostomy on 8/25   - postoperative pain control, DVT prophylaxis, antibiotics, diet, activity, wound care per primary service       Acute blood loss anemia   - s/p PRBC transfusion   - follow H/H       Leukocytosis    - likely reactive, improved       Hypokalemia / hypophophatemia   - replaced         MEDICATIONS:   Charted in MCG       ORDERS:   ADULT ORAL NUTRITION SUPPLEMENT; Breakfast, Lunch, Dinner; Standard High Calorie/High Protein Oral Supplement    ADULT ORAL NUTRITION SUPPLEMENT; Breakfast, Lunch, Dinner; Clear Liquid Oral Supplement    ADULT DIET;  Regular; Low Fiber    Place sequential compression device    indwelling urinary catheter    Up in chair    Up as tolerated    Diet NPO          PT/OT/SLP/CM ASSESSMENT OR NOTES:    8/30  LSW meet with pt at bedside. Discuss DC plan with pt. Pt agree with DC plan home with [facility]. Pt denies any needs at this time. LSW will follow. Intestinal Obstruction - Care Day 6 (8/29/2022) by Anat Szymanski RN       Review Status Review Entered   Completed 9/1/2022 14:21      Criteria Review      Care Day: 6 Care Date: 8/29/2022 Level of Care: Intermediate Care    Guideline Day 2    Level Of Care    ( ) Floor    9/1/2022 2:21 PM EDT by Natasha Hwang      intermediate care unit   tele ordred    Clinical Status    (X) * Hypotension absent    9/1/2022 2:21 PM EDT by Tisha Hwang      98.4, 92, tele sr, resp 16, bp 140/80, pox 93 % on RA    ( ) * Nausea and vomiting absent or improved    9/1/2022 2:21 PM EDT by Renzo Castle      remains on oral and IV zofran as charted    ( ) * Pain absent or managed    9/1/2022 2:21 PM EDT by Renzo Castle      remains on Epidrual    (X) * Abdominal exam stable or improved    Activity    (X) Activity as tolerated    Routes    (X) IV fluids    (X) IV medications    9/1/2022 2:21 PM EDT by Natasha Hwang      iv protonix 40 mg iv x1   iv zosyn 3.375 gm iv q8h   iv sodium phosphate 20 mmol iv x 1  iv d5 LT at 60 cc/hr  Epidural fentaNYL (SUBLIMAZE) 6 ml/hr   prn tums 500 mg po x1 , atarax 10 mg po 1 , zofran 4 mg po x1, iv zofran 4 mg x1    (X) Diet as tolerated    Interventions    (X) * NG tube absent    * Milestone   Additional Notes   DATE: 8/29/2022  care day 6          PERTINENT UPDATES:   Remains on IVF, meds and epidural         ABNL/PERTINENT LABS/RADIOLOGY/DIAGNOSTIC STUDIES:   Cl 108   Bun 5   Creat 0.37   Gluc 114   Alb 2.8   Rbc 3.15   H/h 9.2/ 27.4    Rdw 14.8          PHYSICAL EXAM:   Abdomen: colostomy bag present, active bowel sounds.       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 colon    - s/p ex lap with rectosigmoid resection, total abdominal hysterectomy and bilateral salpingo-oophorectomy, enterectomy, left ureteral repair, and end colostomy on 8/25   - postoperative pain control, DVT prophylaxis, antibiotics, diet, activity, wound care per primary service       Acute blood loss anemia   - s/p PRBC transfusion   - follow H/H       Leukocytosis    - likely reactive, improved       Hypokalemia / hypophophatemia   - replaced            Diet: ADULT DIET; Regular; Low Fiber   ADULT ORAL NUTRITION SUPPLEMENT; Breakfast, Lunch, Dinner; Clear Liquid Oral Supplement       Code Status: Full Code       Surgical impression    ASSESSMENT   1. Hospital day # 5   2 postop day #4 partial colectomy/colostomy           PLAN   1.  Leave Rothman catheter in for 6 more days. She will need to have education regarding home management   2. Advance diet   3. Stop epidural   4. Traditional pain medication   5. Hep-Lock IV   6.  DC HO drain         MEDICATIONS:   Charted in MCG       ORDERS:   ADULT ORAL NUTRITION SUPPLEMENT; Breakfast, Lunch, Dinner; Standard High Calorie/High Protein Oral Supplement    ADULT ORAL NUTRITION SUPPLEMENT; Breakfast, Lunch, Dinner; Clear Liquid Oral Supplement    ADULT DIET; Regular; Low Fiber    Place sequential compression device    indwelling urinary catheter    Up in chair    Up as tolerated    Diet NPO             PT/OT/SLP/CM ASSESSMENT OR NOTES:   LESA 8/29  D[facility]C PLAN ; [address] [facility] [place] SUDH[facility] AT [facility] NOTIFIED TODAY ,  OF PROBABLE REFERRAL FOR NEW COLOSTOMY AND POSSIBLE IV ATB.               Intestinal Obstruction - Care Day 4 (8/27/2022) by Ashley Handy RN       Review Status Review Entered   Completed 9/1/2022 14:08      Criteria Review      Care Day: 4 Care Date: 8/27/2022 Level of Care: ICU    Guideline Day 2    Level Of Care    ( ) Floor    9/1/2022 2:08 PM EDT by Annette Otero      ICU this day    Clinical Status    (X) * Hypotension absent    9/1/2022 2:08 PM EDT by Tisha Hwang      99, 92, 15, 118/56, pulse ox 100 % on ra    ( ) * Nausea and vomiting absent or improved    9/1/2022 2:08 PM EDT by Frantz Fuller      remains NPO    ( ) * Pain absent or managed    9/1/2022 2:08 PM EDT by Frantz Fuller      remains on iv pain meds, Epidural in place    ( ) * Abdominal exam stable or improved    9/1/2022 2:08 PM EDT by Jj Hwang      ABD:  bowel sounds normal, soft,tender to touch colostomy    Activity    (X) Activity as tolerated    Routes    (X) IV fluids    (X) IV medications    9/1/2022 2:08 PM EDT by Jj Hwang      iv albumin 25 gm iv q8h   iv protonix 40 mg iv daily   iv zosyn 3.375 gm iv q8h   iv kcl 10 meq iv x4  IV d5 LR at 60 cc/hr   EPIDURAL  fentnayl GTT at 6 ml/hr   PRN iv fentnayl 25 mcg iv x1 , iv toradol 30 mg iv x1 , iv zofran 4 mg x4    ( ) Diet as tolerated    9/1/2022 2:08 PM EDT by Jj Hwang      npo    Interventions    ( ) * NG tube absent    * Milestone   Additional Notes   DATE 8/27/2022  care day 4       PERTINENT UPDATES:   Remains in ICU   Temp 99 this day    NG remains    Epidural remains   Iv meds as charted in MCG       ABNL/PERTINENT LABS/RADIOLOGY/DIAGNOSTIC STUDIES:   Bun 5   Anion gap 8   Ca 8.4   Phos 1.9   Wbc 12.3   Rbc 2.96   H/h 8.7/ 25.2   Rdw 15.4   Neut 10.6         PHYSICAL EXAM:   ABDOMEN: non-distended, HO serosanguineous, colostomy functioning, bowel sounds present in all 4 quadrants, and no guarding or peritoneal signs      MD CONSULTS/ASSESSMENT AND PLAN:   Surgical impression   ASSESSMENT   1. Hospital day # 3   2 postop day #2 en bloc rectosigmoid/BILLIE/BSO/enterectomy for high-grade obstruction to suspected malignancy       PLAN   1. Clamp NG tube   2.   Out of bed      ICU md impression   This is a critically ill patient at risk of deterioration / death , needing close ICU monitoring and intervention due to below noted problems    · Status post op day 1 myectomy, total abdominal hysterectomy, BSO, with debulking   · Acute

## 2022-09-06 ENCOUNTER — OFFICE VISIT (OUTPATIENT)
Dept: SURGERY | Age: 57
End: 2022-09-06

## 2022-09-06 VITALS
BODY MASS INDEX: 24.66 KG/M2 | OXYGEN SATURATION: 97 % | WEIGHT: 148 LBS | TEMPERATURE: 98 F | HEART RATE: 87 BPM | HEIGHT: 65 IN

## 2022-09-06 DIAGNOSIS — K56.699 DIVERTICULAR STRICTURE (HCC): Primary | ICD-10-CM

## 2022-09-06 PROCEDURE — 99024 POSTOP FOLLOW-UP VISIT: CPT | Performed by: COLON & RECTAL SURGERY

## 2022-09-06 NOTE — PROGRESS NOTES
Subjective:      Patient ID: Rashi Snyder is a 61 y.o. female who presents for:  Chief Complaint   Patient presents with    Post-Op Check       She returns to the office 12 days out from a sigmoid resection with colostomy as well as a single enterectomy, total abdominal hysterectomy with bilateral salpingo-oophorectomy as well as left ureteral repair. She is doing well. She is more than ready to have her catheter removed. Her colostomy is working. She is eating without issues. Histology discussed over the weekend and reinforced about the benign nature of her issue. No past medical history on file. Past Surgical History:   Procedure Laterality Date    COLECTOMY N/A 8/25/2022    SIGMOIDECTOMY WITH ABDOMINAL HYSTERECTOMY WITH BSO, END COLOSTOMY, ENTERECTOMY, CYSTOSCOPY WITH URETERAL STENT PLACEMENT performed by Tatianna Lyons MD at [address] [place] [address] History     Socioeconomic History    Marital status:      Spouse name: Not on file    Number of children: Not on file    Years of education: Not on file    Highest education level: Not on file   Occupational History    Not on file   Tobacco Use    Smoking status: Every Day     Types: Cigarettes    Smokeless tobacco: Current   Vaping Use    Vaping Use: Never used   Substance and Sexual Activity    Alcohol use: Not Currently    Drug use: Never    Sexual activity: Not Currently   Other Topics Concern    Not on file   Social History Narrative    Not on file     Social Determinants of Health     Financial Resource Strain: Not on file   Food Insecurity: Not on file   Transportation Needs: Not on file   Physical Activity: Not on file   Stress: Not on file   Social Connections: Not on file   Intimate Partner Violence: Not on file   Housing Stability: Not on file     No family history on file.   Allergies:  Codeine, Demerol hcl [meperidine], and Dilaudid [hydromorphone]    Review of Systems    Objective:    Pulse 87   Temp 98 °F (36.7 °C) (Temporal) Ht 5' 5\" (1.651 m)   Wt 148 lb (67.1 kg)   LMP  (LMP Unknown)   SpO2 97%   BMI 24.63 kg/m²     Physical Exam  On exam her abdomen is soft and flat. Every other staple was removed. Her colostomy is functioning. Her Rothman catheter was removed. Assessment/Plan:          Diagnosis Orders   1. Diverticular stricture ([address]          Wound care and activity instructions were given. She will return back to see me in the office in 1 week to remove the remainder of her staples as well as ensure that her clinical course is improving. She has an appointment at the beginning of next month to see Dr. Umberto Gilbert regarding her ureteral stent. Please note this report has beenpartially produced using speech recognition software and may cause contain errors related to that system including grammar, punctuation and spelling as well as words and phrases that may seem inappropriate.  If there arequestions or concerns please feel free to contact me to clarify

## 2022-09-09 NOTE — PROGRESS NOTES
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 \"   The patient was treated with IV antibiotics, IV albumin, PRBC, FFP, IVFB and   IVF of D5LR. 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 Belkys Chapa: \"Shock, likely   distributive. \"  Treatment: ICU care, Pressor support to keep MAPS greater than 65 but not   initiated, IV Zosyn & Flagyl, IVFB 500ml, IVF D5LR, IV Albumin, 3u PRBC, 2u   FFP, labs and monitoring    Thank you,  Ирина San   Clinical Documentation Improvement Specialist  W: [phone]  Options provided:  -- Cardiogenic Shock  -- Hemorrhagic Shock  -- Septic Shock  -- Hypovolemic Shock  -- Other - I will add my own diagnosis  -- Disagree - Not applicable / Not valid  -- Disagree - Clinically unable to determine / Unknown  -- Refer to Clinical Documentation Reviewer    PROVIDER RESPONSE TEXT:    This patient is in hypovolemic shock.     Query created by: Barbara Jerry on 2022 8:18 AM      Electronically signed by:  Shadi Dickens DO 2022 8:32 AM

## 2022-09-13 ENCOUNTER — OFFICE VISIT (OUTPATIENT)
Dept: SURGERY | Age: 57
End: 2022-09-13

## 2022-09-13 VITALS
OXYGEN SATURATION: 98 % | HEIGHT: 65 IN | BODY MASS INDEX: 24.66 KG/M2 | WEIGHT: 148 LBS | TEMPERATURE: 97.7 F | HEART RATE: 82 BPM

## 2022-09-13 DIAGNOSIS — K56.699 DIVERTICULAR STRICTURE (HCC): Primary | ICD-10-CM

## 2022-09-13 PROCEDURE — 99024 POSTOP FOLLOW-UP VISIT: CPT | Performed by: COLON & RECTAL SURGERY

## 2022-09-13 NOTE — PROGRESS NOTES
Subjective:      Patient ID: Zoltan Moore is a 61 y.o. female who presents for:  Chief Complaint   Patient presents with    Suture / Staple Removal       AndShe returns to the office about 3 weeks out from a Michael procedure. She has a left ureteral stent 10. She had a total abdominal hysterectomy salpingo-oophorectomy concurrently. Patient's colostomy is working well. She has had problems with burning from her indwelling stent. She is scheduled to see her urologist in 3 weeks. No past medical history on file. Past Surgical History:   Procedure Laterality Date    COLECTOMY N/A 8/25/2022    SIGMOIDECTOMY WITH ABDOMINAL HYSTERECTOMY WITH BSO, END COLOSTOMY, ENTERECTOMY, CYSTOSCOPY WITH URETERAL STENT PLACEMENT performed by Glo Moritz, MD at [address] [place] [address] History     Socioeconomic History    Marital status:      Spouse name: Not on file    Number of children: Not on file    Years of education: Not on file    Highest education level: Not on file   Occupational History    Not on file   Tobacco Use    Smoking status: Every Day     Types: Cigarettes    Smokeless tobacco: Current   Vaping Use    Vaping Use: Never used   Substance and Sexual Activity    Alcohol use: Not Currently    Drug use: Never    Sexual activity: Not Currently   Other Topics Concern    Not on file   Social History Narrative    Not on file     Social Determinants of Health     Financial Resource Strain: Not on file   Food Insecurity: Not on file   Transportation Needs: Not on file   Physical Activity: Not on file   Stress: Not on file   Social Connections: Not on file   Intimate Partner Violence: Not on file   Housing Stability: Not on file     No family history on file.   Allergies:  Codeine, Demerol hcl [meperidine], and Dilaudid [hydromorphone]    Review of Systems    Objective:    Pulse 82   Temp 97.7 °F (36.5 °C) (Temporal)   Ht 5' 5\" (1.651 m)   Wt 148 lb (67.1 kg)   LMP  (LMP Unknown)   SpO2 98%   BMI 24.63 kg/m²     Physical Exam  Her abdomen is soft and nondistended    The remainder of her staples were removed. Steri-Strips were placed for epithelial support. Colostomy functioning. Assessment/Plan:          Diagnosis Orders   1. Diverticular stricture ([address]          I will see her back in the office in 1 month for continued postoperative issues. She will follow-up with her urologist as scheduled. We will make arrangements for preoperative colonoscopy for colostomy closure work-up in the next 6 to 8 weeks. Please note this report has beenpartially produced using speech recognition software and may cause contain errors related to that system including grammar, punctuation and spelling as well as words and phrases that may seem inappropriate.  If there arequestions or concerns please feel free to contact me to clarify

## 2022-09-14 ENCOUNTER — NURSE ONLY (OUTPATIENT)
Dept: UROLOGY | Age: 57
End: 2022-09-14
Payer: COMMERCIAL

## 2022-09-14 ENCOUNTER — TELEPHONE (OUTPATIENT)
Dept: UROLOGY | Age: 57
End: 2022-09-14

## 2022-09-14 DIAGNOSIS — R30.0 BURNING WITH URINATION: Primary | ICD-10-CM

## 2022-09-14 DIAGNOSIS — R30.0 BURNING WITH URINATION: ICD-10-CM

## 2022-09-14 LAB
BILIRUBIN, POC: ABNORMAL
BLOOD URINE, POC: ABNORMAL
CLARITY, POC: ABNORMAL
COLOR, POC: YELLOW
GLUCOSE URINE, POC: ABNORMAL
KETONES, POC: ABNORMAL
LEUKOCYTE EST, POC: ABNORMAL
NITRITE, POC: POSITIVE
PH, POC: 6.5
PROTEIN, POC: ABNORMAL
SPECIFIC GRAVITY, POC: 1.02
UROBILINOGEN, POC: 0.2

## 2022-09-14 PROCEDURE — 81003 URINALYSIS AUTO W/O SCOPE: CPT | Performed by: UROLOGY

## 2022-09-14 RX ORDER — SULFAMETHOXAZOLE AND TRIMETHOPRIM 800; 160 MG/1; MG/1
1 TABLET ORAL 2 TIMES DAILY
Qty: 14 TABLET | Refills: 0 | Status: SHIPPED | OUTPATIENT
Start: 2022-09-14 | End: 2022-09-23

## 2022-09-14 NOTE — TELEPHONE ENCOUNTER
Culture order is pending for you.  Urine showed  Color, UA yellow    Clarity, UA cloudy    Glucose, UA POC neg    Bilirubin, UA neg    Ketones, UA neg    Spec Grav, UA 1.025    Blood, UA POC large    pH, UA 6.5    Protein, UA POC >300mg    Urobilinogen, UA 0.2    Leukocytes, UA large    Nitrite, UA positive

## 2022-09-15 ENCOUNTER — TELEPHONE (OUTPATIENT)
Dept: UROLOGY | Age: 57
End: 2022-09-15

## 2022-09-15 RX ORDER — ONDANSETRON 4 MG/1
4 TABLET, FILM COATED ORAL EVERY 8 HOURS PRN
Qty: 10 TABLET | Refills: 0 | Status: SHIPPED | OUTPATIENT
Start: 2022-09-15

## 2022-09-15 RX ORDER — CEPHALEXIN 500 MG/1
500 CAPSULE ORAL 2 TIMES DAILY
Qty: 14 CAPSULE | Refills: 0 | Status: SHIPPED | OUTPATIENT
Start: 2022-09-15 | End: 2022-09-23

## 2022-09-15 NOTE — TELEPHONE ENCOUNTER
Pt called stating she is having severe vomiting with Bactrim.  She would like a different antibiotic she would also like some zofran for the nausea

## 2022-09-16 LAB
ORGANISM: ABNORMAL
URINE CULTURE, ROUTINE: ABNORMAL
URINE CULTURE, ROUTINE: ABNORMAL

## 2022-09-19 ENCOUNTER — TELEPHONE (OUTPATIENT)
Dept: SURGERY | Age: 57
End: 2022-09-19

## 2022-09-19 NOTE — TELEPHONE ENCOUNTER
Patient called because she is very concerned that she is passing stool even though she has a colostomy. She says that it is not discharge but formed stool. She is worried something is wrong and wants to know what she should do.

## 2022-09-22 ENCOUNTER — TELEPHONE (OUTPATIENT)
Dept: UROLOGY | Age: 57
End: 2022-09-22

## 2022-09-22 NOTE — TELEPHONE ENCOUNTER
Patient is calling stating she is having urinary incont. Patient states that out of no where with no warning she is dripping down her leg. Patient is stating she thinks it could be the sent but she would like some advice because she is in pain in the vaginal area.

## 2022-09-23 ENCOUNTER — APPOINTMENT (OUTPATIENT)
Dept: CT IMAGING | Age: 57
End: 2022-09-23
Payer: COMMERCIAL

## 2022-09-23 ENCOUNTER — OFFICE VISIT (OUTPATIENT)
Dept: UROLOGY | Age: 57
End: 2022-09-23
Payer: COMMERCIAL

## 2022-09-23 ENCOUNTER — HOSPITAL ENCOUNTER (OUTPATIENT)
Dept: GENERAL RADIOLOGY | Age: 57
Discharge: HOME | End: 2022-09-25
Payer: COMMERCIAL

## 2022-09-23 ENCOUNTER — HOSPITAL ENCOUNTER (EMERGENCY)
Age: 57
Discharge: HOME | End: 2022-09-23
Attending: EMERGENCY MEDICINE
Payer: COMMERCIAL

## 2022-09-23 VITALS
TEMPERATURE: 98.2 F | DIASTOLIC BLOOD PRESSURE: 72 MMHG | OXYGEN SATURATION: 100 % | RESPIRATION RATE: 20 BRPM | HEART RATE: 104 BPM | SYSTOLIC BLOOD PRESSURE: 117 MMHG | HEIGHT: 65 IN | WEIGHT: 130 LBS | BODY MASS INDEX: 21.66 KG/M2

## 2022-09-23 VITALS
SYSTOLIC BLOOD PRESSURE: 76 MMHG | BODY MASS INDEX: 21.77 KG/M2 | HEIGHT: 65 IN | WEIGHT: 130.7 LBS | DIASTOLIC BLOOD PRESSURE: 54 MMHG | HEART RATE: 108 BPM

## 2022-09-23 DIAGNOSIS — S37.10XA: Primary | ICD-10-CM

## 2022-09-23 DIAGNOSIS — S37.10XA: ICD-10-CM

## 2022-09-23 DIAGNOSIS — N30.00 ACUTE CYSTITIS WITHOUT HEMATURIA: ICD-10-CM

## 2022-09-23 DIAGNOSIS — A04.72 C. DIFFICILE COLITIS: ICD-10-CM

## 2022-09-23 DIAGNOSIS — R19.7 DIARRHEA, UNSPECIFIED TYPE: ICD-10-CM

## 2022-09-23 DIAGNOSIS — R10.30 LOWER ABDOMINAL PAIN: ICD-10-CM

## 2022-09-23 DIAGNOSIS — E86.0 DEHYDRATION: Primary | ICD-10-CM

## 2022-09-23 LAB
ADENOVIRUS F 40 41 PCR: NOT DETECTED
ALBUMIN SERPL-MCNC: 3.3 G/DL (ref 3.5–4.6)
ALP BLD-CCNC: 236 U/L (ref 40–130)
ALT SERPL-CCNC: 17 U/L (ref 0–33)
ANION GAP SERPL CALCULATED.3IONS-SCNC: 12 MEQ/L (ref 9–15)
AST SERPL-CCNC: 11 U/L (ref 0–35)
ASTROVIRUS PCR: NOT DETECTED
BACTERIA: ABNORMAL /HPF
BASOPHILS ABSOLUTE: 0.1 K/UL (ref 0–0.2)
BASOPHILS RELATIVE PERCENT: 0.5 %
BILIRUB SERPL-MCNC: 0.3 MG/DL (ref 0.2–0.7)
BILIRUBIN URINE: NEGATIVE
BILIRUBIN, POC: ABNORMAL
BLOOD URINE, POC: ABNORMAL
BLOOD, URINE: ABNORMAL
BUN BLDV-MCNC: 14 MG/DL (ref 6–20)
CALCIUM SERPL-MCNC: 9.4 MG/DL (ref 8.5–9.9)
CAMPYLOBACTER PCR: NOT DETECTED
CHLORIDE BLD-SCNC: 99 MEQ/L (ref 95–107)
CLARITY, POC: ABNORMAL
CLARITY: ABNORMAL
CO2: 26 MEQ/L (ref 20–31)
COLOR, POC: YELLOW
COLOR: ABNORMAL
CREAT SERPL-MCNC: 0.86 MG/DL (ref 0.5–0.9)
CRYPTOSPORIDIUM PCR: NOT DETECTED
CYCLOSPORA CAYETANENSIS PCR: NOT DETECTED
E COLI ENTEROAGGREGATIVE PCR: NOT DETECTED
E COLI ENTEROPATHOGENIC PCR: NOT DETECTED
E COLI ENTEROTOXIGENIC PCR: NOT DETECTED
E COLI SHIGELLA/ENTEROINVASIVE PCR: NOT DETECTED
ENTAMOEBA HISTOLYTICA PCR: NOT DETECTED
EOSINOPHILS ABSOLUTE: 0 K/UL (ref 0–0.7)
EOSINOPHILS RELATIVE PERCENT: 0.2 %
EPITHELIAL CELLS, UA: ABNORMAL /HPF (ref 0–5)
GFR AFRICAN AMERICAN: >60
GFR NON-AFRICAN AMERICAN: >60
GIARDIA LAMBLIA PCR: NOT DETECTED
GLOBULIN: 3.7 G/DL (ref 2.3–3.5)
GLUCOSE BLD-MCNC: 127 MG/DL (ref 70–99)
GLUCOSE URINE, POC: ABNORMAL
GLUCOSE URINE: NEGATIVE MG/DL
HCT VFR BLD CALC: 30.8 % (ref 37–47)
HEMOGLOBIN: 9.8 G/DL (ref 12–16)
HYALINE CASTS: ABNORMAL /LPF (ref 0–5)
KETONES, POC: ABNORMAL
KETONES, URINE: ABNORMAL MG/DL
LACTIC ACID: 1 MMOL/L (ref 0.5–2.2)
LEUKOCYTE EST, POC: ABNORMAL
LEUKOCYTE ESTERASE, URINE: ABNORMAL
LYMPHOCYTES ABSOLUTE: 1.6 K/UL (ref 1–4.8)
LYMPHOCYTES RELATIVE PERCENT: 8.9 %
MAGNESIUM: 1.7 MG/DL (ref 1.7–2.4)
MCH RBC QN AUTO: 26.6 PG (ref 27–31.3)
MCHC RBC AUTO-ENTMCNC: 31.8 % (ref 33–37)
MCV RBC AUTO: 83.8 FL (ref 82–100)
MONOCYTES ABSOLUTE: 1.3 K/UL (ref 0.2–0.8)
MONOCYTES RELATIVE PERCENT: 7 %
NEUTROPHILS ABSOLUTE: 15.4 K/UL (ref 1.4–6.5)
NEUTROPHILS RELATIVE PERCENT: 83.4 %
NITRITE, POC: ABNORMAL
NITRITE, URINE: POSITIVE
NOROVIRUS GI GII PCR: NOT DETECTED
PDW BLD-RTO: 15.9 % (ref 11.5–14.5)
PH UA: 5.5 (ref 5–9)
PH, POC: 6
PLATELET # BLD: 437 K/UL (ref 130–400)
PLESIOMONAS SHIGELLOIDES PCR: NOT DETECTED
POTASSIUM SERPL-SCNC: 3.7 MEQ/L (ref 3.4–4.9)
PROTEIN UA: 100 MG/DL
PROTEIN, POC: ABNORMAL
RBC # BLD: 3.68 M/UL (ref 4.2–5.4)
RBC UA: ABNORMAL /HPF (ref 0–2)
RENAL EPITHELIAL, UA: ABNORMAL /HPF
ROTAVIRUS A PCR: NOT DETECTED
SALMONELLA PCR: NOT DETECTED
SAPOVIRUS PCR: NOT DETECTED
SHIGA-LIKE TOXIN-PRODUCING E. COLI (STEC) STX1/STX2: NOT DETECTED
SODIUM BLD-SCNC: 137 MEQ/L (ref 135–144)
SPECIFIC GRAVITY UA: 1.02 (ref 1–1.03)
SPECIFIC GRAVITY, POC: 1.02
TOTAL PROTEIN: 7 G/DL (ref 6.3–8)
TROPONIN: <0.01 NG/ML (ref 0–0.01)
URINE REFLEX TO CULTURE: YES
UROBILINOGEN, POC: 0.2
UROBILINOGEN, URINE: 0.2 E.U./DL
VIBRIO CHOLERAE PCR: NOT DETECTED
VIBRIO PCR: NOT DETECTED
WBC # BLD: 18.5 K/UL (ref 4.8–10.8)
WBC UA: >100 /HPF (ref 0–5)
YERSINIA ENTEROCOLITICA PCR: NOT DETECTED

## 2022-09-23 PROCEDURE — 87449 NOS EACH ORGANISM AG IA: CPT

## 2022-09-23 PROCEDURE — 2580000003 HC RX 258: Performed by: EMERGENCY MEDICINE

## 2022-09-23 PROCEDURE — 80053 COMPREHEN METABOLIC PANEL: CPT

## 2022-09-23 PROCEDURE — 85025 COMPLETE CBC W/AUTO DIFF WBC: CPT

## 2022-09-23 PROCEDURE — 96376 TX/PRO/DX INJ SAME DRUG ADON: CPT

## 2022-09-23 PROCEDURE — 83605 ASSAY OF LACTIC ACID: CPT

## 2022-09-23 PROCEDURE — 87086 URINE CULTURE/COLONY COUNT: CPT

## 2022-09-23 PROCEDURE — 87507 IADNA-DNA/RNA PROBE TQ 12-25: CPT

## 2022-09-23 PROCEDURE — 74018 RADEX ABDOMEN 1 VIEW: CPT

## 2022-09-23 PROCEDURE — 81003 URINALYSIS AUTO W/O SCOPE: CPT | Performed by: UROLOGY

## 2022-09-23 PROCEDURE — 99204 OFFICE O/P NEW MOD 45 MIN: CPT | Performed by: UROLOGY

## 2022-09-23 PROCEDURE — 83735 ASSAY OF MAGNESIUM: CPT

## 2022-09-23 PROCEDURE — 74177 CT ABD & PELVIS W/CONTRAST: CPT

## 2022-09-23 PROCEDURE — 87493 C DIFF AMPLIFIED PROBE: CPT

## 2022-09-23 PROCEDURE — 6360000004 HC RX CONTRAST MEDICATION: Performed by: EMERGENCY MEDICINE

## 2022-09-23 PROCEDURE — 96365 THER/PROPH/DIAG IV INF INIT: CPT

## 2022-09-23 PROCEDURE — 87077 CULTURE AEROBIC IDENTIFY: CPT

## 2022-09-23 PROCEDURE — 96361 HYDRATE IV INFUSION ADD-ON: CPT

## 2022-09-23 PROCEDURE — 84484 ASSAY OF TROPONIN QUANT: CPT

## 2022-09-23 PROCEDURE — 36415 COLL VENOUS BLD VENIPUNCTURE: CPT

## 2022-09-23 PROCEDURE — 87324 CLOSTRIDIUM AG IA: CPT

## 2022-09-23 PROCEDURE — 99285 EMERGENCY DEPT VISIT HI MDM: CPT

## 2022-09-23 PROCEDURE — 96375 TX/PRO/DX INJ NEW DRUG ADDON: CPT

## 2022-09-23 PROCEDURE — 81001 URINALYSIS AUTO W/SCOPE: CPT

## 2022-09-23 PROCEDURE — 87186 SC STD MICRODIL/AGAR DIL: CPT

## 2022-09-23 PROCEDURE — 6360000002 HC RX W HCPCS: Performed by: EMERGENCY MEDICINE

## 2022-09-23 RX ORDER — ONDANSETRON 2 MG/ML
4 INJECTION INTRAMUSCULAR; INTRAVENOUS ONCE
Status: COMPLETED | OUTPATIENT
Start: 2022-09-23 | End: 2022-09-23

## 2022-09-23 RX ORDER — ONDANSETRON 4 MG/1
4 TABLET, FILM COATED ORAL 3 TIMES DAILY PRN
Qty: 15 TABLET | Refills: 0 | Status: SHIPPED | OUTPATIENT
Start: 2022-09-23 | End: 2022-10-06

## 2022-09-23 RX ORDER — CIPROFLOXACIN 500 MG/1
500 TABLET, FILM COATED ORAL 2 TIMES DAILY
Qty: 14 TABLET | Refills: 0 | Status: SHIPPED | OUTPATIENT
Start: 2022-09-23 | End: 2022-09-30

## 2022-09-23 RX ORDER — OXYBUTYNIN CHLORIDE 10 MG/1
10 TABLET, EXTENDED RELEASE ORAL DAILY
Qty: 30 TABLET | Refills: 0 | Status: SHIPPED | OUTPATIENT
Start: 2022-09-23

## 2022-09-23 RX ORDER — 0.9 % SODIUM CHLORIDE 0.9 %
30 INTRAVENOUS SOLUTION INTRAVENOUS ONCE
Status: COMPLETED | OUTPATIENT
Start: 2022-09-23 | End: 2022-09-23

## 2022-09-23 RX ADMIN — ONDANSETRON 4 MG: 2 INJECTION INTRAMUSCULAR; INTRAVENOUS at 12:28

## 2022-09-23 RX ADMIN — IOPAMIDOL 50 ML: 612 INJECTION, SOLUTION INTRAVENOUS at 11:52

## 2022-09-23 RX ADMIN — PIPERACILLIN AND TAZOBACTAM 3375 MG: 3; .375 INJECTION, POWDER, FOR SOLUTION INTRAVENOUS at 13:32

## 2022-09-23 RX ADMIN — SODIUM CHLORIDE 1000 ML: 9 INJECTION, SOLUTION INTRAVENOUS at 11:07

## 2022-09-23 RX ADMIN — ONDANSETRON 4 MG: 2 INJECTION INTRAMUSCULAR; INTRAVENOUS at 14:44

## 2022-09-23 ASSESSMENT — ENCOUNTER SYMPTOMS
VOMITING: 0
ABDOMINAL DISTENTION: 0
ABDOMINAL PAIN: 1
DIARRHEA: 1
CHEST TIGHTNESS: 0
EYE PAIN: 0
ABDOMINAL PAIN: 0
SHORTNESS OF BREATH: 0
SORE THROAT: 0
NAUSEA: 1

## 2022-09-23 ASSESSMENT — LIFESTYLE VARIABLES
HOW OFTEN DO YOU HAVE A DRINK CONTAINING ALCOHOL: NEVER
HOW MANY STANDARD DRINKS CONTAINING ALCOHOL DO YOU HAVE ON A TYPICAL DAY: PATIENT DOES NOT DRINK

## 2022-09-23 ASSESSMENT — PAIN - FUNCTIONAL ASSESSMENT: PAIN_FUNCTIONAL_ASSESSMENT: NONE - DENIES PAIN

## 2022-09-23 NOTE — ED PROVIDER NOTES
above the remainder of the review of systems was reviewed and negative. PAST MEDICAL HISTORY     Past Medical History:   Diagnosis Date    Diverticulitis          SURGICAL HISTORY       Past Surgical History:   Procedure Laterality Date    COLECTOMY N/A 8/25/2022    SIGMOIDECTOMY WITH ABDOMINAL HYSTERECTOMY WITH BSO, END COLOSTOMY, ENTERECTOMY, CYSTOSCOPY WITH URETERAL STENT PLACEMENT performed by May Elmore MD at [address] [place] [address]       Previous Medications    MULTIPLE VITAMINS-MINERALS (THERAPEUTIC MULTIVITAMIN-MINERALS) TABLET    Take 1 tablet by mouth daily    ONDANSETRON (ZOFRAN) 4 MG TABLET    Take 1 tablet by mouth every 8 hours as needed for Nausea or Vomiting    ONDANSETRON (ZOFRAN) 4 MG TABLET    Take 1 tablet by mouth 3 times daily as needed for Nausea or Vomiting       ALLERGIES     Bactrim [sulfamethoxazole-trimethoprim], Ciprofloxacin, Codeine, Demerol hcl [meperidine], and Dilaudid [hydromorphone]    FAMILY HISTORY     History reviewed. No pertinent family history.        SOCIAL HISTORY       Social History     Socioeconomic History    Marital status:      Spouse name: None    Number of children: None    Years of education: None    Highest education level: None   Tobacco Use    Smoking status: Every Day     Types: Cigarettes    Smokeless tobacco: Current   Vaping Use    Vaping Use: Never used   Substance and Sexual Activity    Alcohol use: Not Currently    Drug use: Never    Sexual activity: Not Currently       SCREENINGS        Alexis Coma Scale  Eye Opening: Spontaneous  Best Verbal Response: Oriented  Best Motor Response: Obeys commands  Alexis Coma Scale Score: 15               PHYSICAL EXAM    (up to 7 for level 4, 8 or more for level 5)     ED Triage Vitals [09/23/22 1008]   BP Temp Temp Source Heart Rate Resp SpO2 Height Weight   (!) 89/56 98.2 °F (36.8 °C) Oral (!) 104 20 98 % 5' 5\" (1.651 m) 130 lb (59 kg)       Physical Exam  Vitals and nursing note reviewed. Constitutional:       General: She is not in acute distress. Appearance: She is well-developed. She is ill-appearing. She is not diaphoretic. HENT:      Head: Normocephalic and atraumatic. Right Ear: External ear normal.      Left Ear: External ear normal.      Nose: Nose normal.      Mouth/Throat:      Mouth: Mucous membranes are moist.      Pharynx: No oropharyngeal exudate. Eyes:      Extraocular Movements: Extraocular movements intact. Conjunctiva/sclera: Conjunctivae normal.      Pupils: Pupils are equal, round, and reactive to light. Neck:      Thyroid: No thyromegaly. Vascular: No JVD. Trachea: No tracheal deviation. Cardiovascular:      Rate and Rhythm: Normal rate. Heart sounds: Normal heart sounds. No murmur heard. Pulmonary:      Effort: Pulmonary effort is normal. No respiratory distress. Breath sounds: Normal breath sounds. No wheezing. Abdominal:      General: Bowel sounds are normal.      Palpations: Abdomen is soft. Tenderness: There is no abdominal tenderness. There is no guarding or rebound. Comments: Colostomy bag has liquid green stool   Musculoskeletal:         General: Normal range of motion. Cervical back: Normal range of motion and neck supple. Right lower leg: No edema. Left lower leg: No edema. Skin:     General: Skin is warm and dry. Coloration: Skin is pale. Findings: No rash. Neurological:      General: No focal deficit present. Mental Status: She is alert and oriented to person, place, and time. Cranial Nerves: No cranial nerve deficit.    Psychiatric:         Behavior: Behavior normal.       DIAGNOSTIC RESULTS     EKG: All EKG's are interpreted by the Emergency Department Physician who either signs or Co-signs this chart in the absence of a cardiologist.        RADIOLOGY:   Non-plain film images such as CT, Ultrasound and MRI are read by the radiologist. Plain radiographic images are visualized and preliminarily interpreted by the emergency physician with the below findings:        Interpretation per the Radiologist below, if available at the time of this note:    CT ABDOMEN PELVIS W IV CONTRAST Additional Contrast? None   Final Result   Since prior examination, there has been interval placement of right ureteral   stent. Since prior examination, there has been interval placement of colostomy in   the left lower quadrant area. .  There has been creation of with Michael   pouch within the pelvis and large bowel anastomosis within the central pelvis. There is extensive amount of stranding within the left pelvic side wall   surrounding left distal ureteral stent with the collection measuring 7.5 x   2.9 cm. Differential possibilities for this collection include organized   hematoma, urinoma initial stages of abscess collection is another possibility   however the lesion does not have a simple fluid density. .               ED BEDSIDE ULTRASOUND:   Performed by ED Physician - none    LABS:  Labs Reviewed   CBC WITH AUTO DIFFERENTIAL - Abnormal; Notable for the following components:       Result Value    WBC 18.5 (*)     RBC 3.68 (*)     Hemoglobin 9.8 (*)     Hematocrit 30.8 (*)     MCH 26.6 (*)     MCHC 31.8 (*)     RDW 15.9 (*)     Platelets 264 (*)     Neutrophils Absolute 15.4 (*)     Monocytes Absolute 1.3 (*)     All other components within normal limits   COMPREHENSIVE METABOLIC PANEL - Abnormal; Notable for the following components:    Glucose 127 (*)     Albumin 3.3 (*)     Alkaline Phosphatase 236 (*)     Globulin 3.7 (*)     All other components within normal limits   URINALYSIS WITH REFLEX TO CULTURE - Abnormal; Notable for the following components:    Color, UA DARK YELLOW (*)     Clarity, UA TURBID (*)     Ketones, Urine TRACE (*)     Blood, Urine LARGE (*)     Protein,  (*)     Nitrite, Urine POSITIVE (*)     Leukocyte Esterase, Urine LARGE (*)     All other components within normal limits   MICROSCOPIC URINALYSIS - Abnormal; Notable for the following components:    RBC, UA 20-50 (*)     Renal Epithelial, UA 0-2 (*)     Bacteria, UA MODERATE (*)     WBC, UA >100 (*)     All other components within normal limits   GASTROINTESTINAL PANEL, MOLECULAR   C DIFF TOXIN/ANTIGEN   CULTURE, URINE   LACTIC ACID   MAGNESIUM   TROPONIN       All other labs were within normal range or not returned as of this dictation. EMERGENCY DEPARTMENT COURSE and DIFFERENTIAL DIAGNOSIS/MDM:   Vitals:    Vitals:    09/23/22 1008 09/23/22 1100 09/23/22 1130   BP: (!) 89/56 (!) 91/46 103/64   Pulse: (!) 104     Resp: 20     Temp: 98.2 °F (36.8 °C)     TempSrc: Oral     SpO2: 98%  99%   Weight: 130 lb (59 kg)     Height: 5' 5\" (1.651 m)         Patient came in hypotensive, dehydrated. After IV fluids and Zofran patient doing better. Urine is purulent and was given IV Zosyn. Does not show any requiring drainage or any intervention Dr. Jesus Elise and Dr. Ria Temple reviewed the CAT scans. Is stable blood pressure is better patient feels better and [facility] She will follow-up with Dr. Jesus Elise next week in the office    [facility]        [address] time was 30 minutes, excluding separately reportable procedures. There was a high probability of clinically significant/life threatening deterioration in the patient's condition which required my urgent intervention. CONSULTS:  None    PROCEDURES:  Unless otherwise noted below, none     Procedures        FINAL IMPRESSION      1. Dehydration    2. Acute cystitis without hematuria    3. Lower abdominal pain    4.  Diarrhea, unspecified type          DISPOSITION/PLAN   DISPOSITION Discharge - Pending Orders Complete 09/23/2022 02:17:07 PM      PATIENT REFERRED TO:  Freddy Trevino MD  [address] [facility] [address]  [address] [facility]  [phone]      next week      DISCHARGE MEDICATIONS:  New Prescriptions    CIPROFLOXACIN (CIPRO) 500 MG TABLET    Take 1 tablet by mouth 2 times daily for 7 days     Controlled Substances Monitoring:     No flowsheet data found.     (Please note that portions of this note were completed with a voice recognition program.  Efforts were made to edit the dictations but occasionally words are mis-transcribed.)    Boo De Jesus DO (electronically signed)  Attending Emergency Physician            Boo De Jesus DO  09/23/22 3222

## 2022-09-23 NOTE — PROGRESS NOTES
Subjective:      Patient ID: Mehdi Mendoza is a 61 y.o. female    HPI  This is a 62 y/o s/p Rectosigmoid resection and Lt ureteral injury repair with cystoscopy and stent on 8/25/22 (4 weeks post-op). Since discharge she was doing well until about one week ago developed UTI symptoms and was diagnosed with an E. Coli UTI on 9/14/22 and treated with Keflex as she tried Bactrim but gave her GI complaints. She is here today as she started to have problems with urinary incontinence. She has had intermittent nausea since using the antibiotics and today developed some diarrhea in her colostomy bag. She has no fever or flank pain. She gets some lower abdominal pain near her incision. She has frequency but no hematuria. She is dizzy today and her BP is running low and she feels dehydrated. She is anxious about her current condition. Her  was also present. I reviewed the KUB and the stent is in good position. The incontinence which is urge related has improved today. No past medical history on file. Past Surgical History:   Procedure Laterality Date    COLECTOMY N/A 8/25/2022    SIGMOIDECTOMY WITH ABDOMINAL HYSTERECTOMY WITH BSO, END COLOSTOMY, ENTERECTOMY, CYSTOSCOPY WITH URETERAL STENT PLACEMENT performed by Lazarus Moats, MD at [address] [place] [address] History     Socioeconomic History    Marital status:    Tobacco Use    Smoking status: Every Day     Types: Cigarettes    Smokeless tobacco: Current   Vaping Use    Vaping Use: Never used   Substance and Sexual Activity    Alcohol use: Not Currently    Drug use: Never    Sexual activity: Not Currently     No family history on file.   Current Outpatient Medications   Medication Sig Dispense Refill    cephALEXin (KEFLEX) 500 MG capsule Take 1 capsule by mouth 2 times daily 14 capsule 0    ondansetron (ZOFRAN) 4 MG tablet Take 1 tablet by mouth every 8 hours as needed for Nausea or Vomiting 10 tablet 0    sulfamethoxazole-trimethoprim (BACTRIM DS) 800-160 MG per tablet Take 1 tablet by mouth 2 times daily 14 tablet 0    Multiple Vitamins-Minerals (THERAPEUTIC MULTIVITAMIN-MINERALS) tablet Take 1 tablet by mouth daily       No current facility-administered medications for this visit. Bactrim [sulfamethoxazole-trimethoprim], Ciprofloxacin, Codeine, Demerol hcl [meperidine], and Dilaudid [hydromorphone]  reviewed      Review of Systems   Constitutional:  Positive for chills. Negative for fever. Gastrointestinal:  Negative for abdominal distention and abdominal pain. Genitourinary:  Negative for flank pain and hematuria. Objective:   Physical Exam  Abdominal:      General: There is no distension. Palpations: Abdomen is soft. Tenderness: There is no abdominal tenderness. There is no right CVA tenderness, left CVA tenderness or guarding. Neurological:      Mental Status: She is alert. XR ABDOMEN (KUB) (SINGLE AP VIEW) [[id number]]  Status: Final result     Order Providers    Authorizing Encounter Billing   Charity Donis MD [address] [place] [address] Shellie Lnua MD            Signed by    Signed Date/Time Phone Pager   Blake Elliott 9/23/2022  9:13 [phone]      Reading Providers    Read Date Phone Pager   Blake Elliott Fri Sep 23, 2022  9:13 [phone]                XR ABDOMEN (KUB) (SINGLE AP VIEW): Patient Communication     Add Comments   Add Notifications          Routing History    Priority Sent On From To Message Type    9/23/2022  9:15 AM Jordan, Chpo Incoming Radiant Results From Kacy [place] MD Results               Radiation Dose Estimates    No radiation information found for this patient            Narrative   EXAMINATION:   ONE SUPINE XRAY VIEW(S) OF THE ABDOMEN       9/23/2022 8:42 am       COMPARISON:   None. HISTORY:   ORDERING SYSTEM PROVIDED HISTORY: Ureteral transection of left native kidney,   initial encounter       FINDINGS:   The gas pattern is nonspecific in distribution.   There is colostomy bag on   the left. Double-J stent is noted on the left. No evidence of calculi   identified within the left collecting system or along the left ureter. Tiny   surgical clip seen superimposing the left iliac bone. Impression   Double-J stent on the left otherwise negative study     Assessment: This is a 60 y/o s/p Rectosigmoid resection and Lt ureteral injury repair with cystoscopy and stent on 8/25/22 (4 weeks post-op) and developed a UTI about one week ago treated with Keflex (last dose today) and has had nausea and just started diarrhea today. The UTI symptoms have resolved. The stent appears in good position by KUB. She is hypotensive and dizzy in office and may be dehydrated and I recommend she be seen in ED for full evaluation. I discussed her case with Dr Yunior Beltran. The patient and  agree to ED evaluation.        Plan:      Cystoscopy with Lt retrograde pyelogram and possible Lt double-J stent removal or replacement, GA on 10/10/22 and will arrange once ED evaluation has been complete next week   Urine for C/S and will call for results  Zofran 4 mg po every 8 hrs prn, #15        Elijah Gar MD

## 2022-09-24 LAB
C DIFF TOXIN/ANTIGEN: NORMAL
C. DIFFICILE TOXIN MOLECULAR: ABNORMAL
GFR AFRICAN AMERICAN: >60
GFR NON-AFRICAN AMERICAN: >60
PERFORMED ON: NORMAL
POC CREATININE: 0.9 MG/DL (ref 0.6–1.2)
POC SAMPLE TYPE: NORMAL

## 2022-09-25 LAB
ORGANISM: ABNORMAL
URINE CULTURE, ROUTINE: ABNORMAL
URINE CULTURE, ROUTINE: ABNORMAL
URINE CULTURE, ROUTINE: NORMAL

## 2022-09-26 ENCOUNTER — TELEPHONE (OUTPATIENT)
Dept: SURGERY | Age: 57
End: 2022-09-26

## 2022-09-26 RX ORDER — VANCOMYCIN HYDROCHLORIDE 125 MG/1
125 CAPSULE ORAL 4 TIMES DAILY
Qty: 40 CAPSULE | Refills: 0 | Status: SHIPPED | OUTPATIENT
Start: 2022-09-26 | End: 2022-10-06

## 2022-09-26 NOTE — TELEPHONE ENCOUNTER
Tika Swenson  states that the patient has low output in her colostomy. Her intake is minimal and she is passing gas. Should/could she take a stool softener or something else to get her bowels moving?

## 2022-09-26 NOTE — TELEPHONE ENCOUNTER
Yvon Robert called in and wanted you to know that she went to the ED on 9/23/22. She is positive for C-diff. The ED did put her on Ciprofloxacin that they sent in for her. Pt did say that she has some diarrhea in her colostomy bag. She would like to know what she should do or if you wanted to see her in the office. Pt does have an appt on 10/14/22 at 1:30.

## 2022-09-27 ASSESSMENT — ENCOUNTER SYMPTOMS: NAUSEA: 1

## 2022-10-03 ENCOUNTER — TELEPHONE (OUTPATIENT)
Dept: UROLOGY | Age: 57
End: 2022-10-03

## 2022-10-03 DIAGNOSIS — N20.0 KIDNEY STONE: Primary | ICD-10-CM

## 2022-10-04 ENCOUNTER — NURSE ONLY (OUTPATIENT)
Dept: UROLOGY | Age: 57
End: 2022-10-04
Payer: COMMERCIAL

## 2022-10-04 ENCOUNTER — HOSPITAL ENCOUNTER (OUTPATIENT)
Dept: CT IMAGING | Age: 57
Discharge: HOME | End: 2022-10-06
Payer: COMMERCIAL

## 2022-10-04 ENCOUNTER — TELEPHONE (OUTPATIENT)
Dept: UROLOGY | Age: 57
End: 2022-10-04

## 2022-10-04 DIAGNOSIS — R30.0 BURNING WITH URINATION: ICD-10-CM

## 2022-10-04 DIAGNOSIS — R30.0 BURNING WITH URINATION: Primary | ICD-10-CM

## 2022-10-04 DIAGNOSIS — N20.0 KIDNEY STONE: ICD-10-CM

## 2022-10-04 LAB
BILIRUBIN, POC: ABNORMAL
BLOOD URINE, POC: ABNORMAL
CLARITY, POC: CLEAR
COLOR, POC: ABNORMAL
GLUCOSE URINE, POC: ABNORMAL
KETONES, POC: ABNORMAL
LEUKOCYTE EST, POC: ABNORMAL
NITRITE, POC: POSITIVE
PH, POC: 6.5
PROTEIN, POC: ABNORMAL
SPECIFIC GRAVITY, POC: 1.02
UROBILINOGEN, POC: 0.2

## 2022-10-04 PROCEDURE — 74178 CT ABD&PLV WO CNTR FLWD CNTR: CPT | Performed by: UROLOGY

## 2022-10-04 PROCEDURE — 81003 URINALYSIS AUTO W/O SCOPE: CPT | Performed by: UROLOGY

## 2022-10-04 PROCEDURE — 6360000004 HC RX CONTRAST MEDICATION: Performed by: UROLOGY

## 2022-10-04 RX ADMIN — IOPAMIDOL 50 ML: 612 INJECTION, SOLUTION INTRAVENOUS at 10:40

## 2022-10-05 ENCOUNTER — TELEPHONE (OUTPATIENT)
Dept: UROLOGY | Age: 57
End: 2022-10-05

## 2022-10-06 ENCOUNTER — HOSPITAL ENCOUNTER (OUTPATIENT)
Dept: PREADMISSION TESTING | Age: 57
Discharge: HOME | End: 2022-10-10
Payer: COMMERCIAL

## 2022-10-06 VITALS
TEMPERATURE: 98.9 F | HEIGHT: 65 IN | SYSTOLIC BLOOD PRESSURE: 105 MMHG | BODY MASS INDEX: 22.66 KG/M2 | HEART RATE: 56 BPM | RESPIRATION RATE: 16 BRPM | OXYGEN SATURATION: 98 % | DIASTOLIC BLOOD PRESSURE: 65 MMHG | WEIGHT: 136 LBS

## 2022-10-06 LAB
ANION GAP SERPL CALCULATED.3IONS-SCNC: 7 MEQ/L (ref 9–15)
APTT: 32.4 SEC (ref 24.4–36.8)
BASOPHILS ABSOLUTE: 0.1 K/UL (ref 0–0.2)
BASOPHILS RELATIVE PERCENT: 0.8 %
BUN BLDV-MCNC: 16 MG/DL (ref 6–20)
CALCIUM SERPL-MCNC: 9.7 MG/DL (ref 8.5–9.9)
CHLORIDE BLD-SCNC: 100 MEQ/L (ref 95–107)
CO2: 30 MEQ/L (ref 20–31)
CREAT SERPL-MCNC: 0.57 MG/DL (ref 0.5–0.9)
EOSINOPHILS ABSOLUTE: 0.1 K/UL (ref 0–0.7)
EOSINOPHILS RELATIVE PERCENT: 1.9 %
GFR AFRICAN AMERICAN: >60
GFR NON-AFRICAN AMERICAN: >60
GLUCOSE BLD-MCNC: 100 MG/DL (ref 70–99)
HCT VFR BLD CALC: 32.4 % (ref 37–47)
HEMOGLOBIN: 10.4 G/DL (ref 12–16)
INR BLD: 1
LYMPHOCYTES ABSOLUTE: 2.3 K/UL (ref 1–4.8)
LYMPHOCYTES RELATIVE PERCENT: 30.5 %
MCH RBC QN AUTO: 27.7 PG (ref 27–31.3)
MCHC RBC AUTO-ENTMCNC: 32.3 % (ref 33–37)
MCV RBC AUTO: 85.8 FL (ref 82–100)
MONOCYTES ABSOLUTE: 0.5 K/UL (ref 0.2–0.8)
MONOCYTES RELATIVE PERCENT: 6.9 %
NEUTROPHILS ABSOLUTE: 4.5 K/UL (ref 1.4–6.5)
NEUTROPHILS RELATIVE PERCENT: 59.9 %
PDW BLD-RTO: 17.7 % (ref 11.5–14.5)
PLATELET # BLD: 564 K/UL (ref 130–400)
POTASSIUM SERPL-SCNC: 4.4 MEQ/L (ref 3.4–4.9)
PROTHROMBIN TIME: 13.4 SEC (ref 12.3–14.9)
RBC # BLD: 3.77 M/UL (ref 4.2–5.4)
SODIUM BLD-SCNC: 137 MEQ/L (ref 135–144)
WBC # BLD: 7.6 K/UL (ref 4.8–10.8)

## 2022-10-06 PROCEDURE — 85730 THROMBOPLASTIN TIME PARTIAL: CPT

## 2022-10-06 PROCEDURE — 85025 COMPLETE CBC W/AUTO DIFF WBC: CPT

## 2022-10-06 PROCEDURE — 85610 PROTHROMBIN TIME: CPT

## 2022-10-06 PROCEDURE — 80048 BASIC METABOLIC PNL TOTAL CA: CPT

## 2022-10-06 RX ORDER — OMEPRAZOLE 10 MG/1
10 CAPSULE, DELAYED RELEASE ORAL DAILY
COMMUNITY

## 2022-10-06 RX ORDER — ACETAMINOPHEN 500 MG
500 TABLET ORAL EVERY 6 HOURS PRN
COMMUNITY

## 2022-10-06 RX ORDER — SODIUM CHLORIDE 0.9 % (FLUSH) 0.9 %
5-40 SYRINGE (ML) INJECTION EVERY 12 HOURS SCHEDULED
Status: CANCELLED | OUTPATIENT
Start: 2022-10-06

## 2022-10-06 RX ORDER — SODIUM CHLORIDE, SODIUM LACTATE, POTASSIUM CHLORIDE, CALCIUM CHLORIDE 600; 310; 30; 20 MG/100ML; MG/100ML; MG/100ML; MG/100ML
INJECTION, SOLUTION INTRAVENOUS CONTINUOUS
Status: CANCELLED | OUTPATIENT
Start: 2022-10-06

## 2022-10-06 RX ORDER — SODIUM CHLORIDE 0.9 % (FLUSH) 0.9 %
5-40 SYRINGE (ML) INJECTION PRN
Status: CANCELLED | OUTPATIENT
Start: 2022-10-06

## 2022-10-06 RX ORDER — SODIUM CHLORIDE 9 MG/ML
INJECTION, SOLUTION INTRAVENOUS PRN
Status: CANCELLED | OUTPATIENT
Start: 2022-10-06

## 2022-10-06 RX ORDER — CIPROFLOXACIN 2 MG/ML
400 INJECTION, SOLUTION INTRAVENOUS EVERY 12 HOURS
Status: CANCELLED | OUTPATIENT
Start: 2022-10-10

## 2022-10-06 RX ORDER — LIDOCAINE HYDROCHLORIDE 10 MG/ML
1 INJECTION, SOLUTION EPIDURAL; INFILTRATION; INTRACAUDAL; PERINEURAL
Status: CANCELLED | OUTPATIENT
Start: 2022-10-06 | End: 2022-10-07

## 2022-10-06 ASSESSMENT — ENCOUNTER SYMPTOMS
CHEST TIGHTNESS: 0
EYE DISCHARGE: 0
SORE THROAT: 0
ABDOMINAL PAIN: 0
VOMITING: 0
EYE REDNESS: 0
BLOOD IN STOOL: 0
TROUBLE SWALLOWING: 0
ABDOMINAL DISTENTION: 0
RHINORRHEA: 0
NAUSEA: 0
EYE PAIN: 0
SINUS PRESSURE: 0
PHOTOPHOBIA: 0
COUGH: 0
WHEEZING: 0
SHORTNESS OF BREATH: 0
EYE ITCHING: 0
BACK PAIN: 1
ALLERGIC/IMMUNOLOGIC NEGATIVE: 1
CONSTIPATION: 0
DIARRHEA: 0
SINUS PAIN: 0

## 2022-10-06 NOTE — H&P
Preoperative Consultation      Name: Martín Buckner  YOB: 1965  Date of Service: 10/6/2022      CHIEF COMPLAINT:  ureteral transection of left native kidney    HISTORY OF PRESENT ILLNESS:      The patient is a 61 y.o. female with significant past medical history of  ureteral transection of left native kidney who presents for a preoperative consultation at the request of surgeon, Dr. Myles Gonzalez, who plans on performing cystoscopy with left retrograde pyelogram and possible left double J stent removal or replacement on 10/10/22 at [facility] [place]. Pt reports in Aug 2022 she went to the ED dt constipation and bloating. She reports they did a CT scan which \"showed a mass and an obstruction\". Pt is s/p rectosigmoid resection 8/25/22. Pt has colostomy. During surgery she had left ureteral injury repair with cystoscopy and stent. Pt had abdominal hysterectomy done as well 8/25/22. Pt reports after discharge in early Sept 2022 she began having UTI like symptoms-burning, frequency, urgency, pain with urination. She reports she has been treated twice for UTI with antibiotics. She reports she had a reaction to bactrim that was given to her during first infection. Pt reports she finished antibiotics for UTI last week. Pt also reports she was recently sent to the ED on 9/23/22 after office visit with Dr. Myles Gonzalez due to diarrhea from her colostomy bag and feeling light headed and dizzy. Pt was hypotensive as well. She reports she was treated in the ED and diagnosed with Cdiff. Pt reports she is currently taking vancomycin for Cdiff infection. Pt complains of frequency, urgency, burning, and pain today. She reports she has seen small amounts of blood in her urine. Pt reports she gave a urine sample again today prior to PAT as requested by Dr. Myles Gonzalez. Pt reports \"I am so sick of dealing with all of this I hope this stent comes out\". Pt rates pain 5/10 today \"burning\" and says its constant.  Pt reports the pain wakes her up throughout the night. Pt recently quit smoking Aug 2022 (1.5 pk a day for 24 years).      Planned Anesthesia: General  Known Anesthesia Problems: Pt reports post op N/V and urinary retention with most recent surgery 8/25/22  Bleeding Risk: Pt reports she had blood transfusion 8/25/22 and s/p birth of son in 2000  Patient Objection to Receiving Blood Products: No  Personal of FH of DVT/PE: No  Medical/Cardiac Clearance Needed: No    Past Medical History:        Diagnosis Date    Diverticulitis     History of blood transfusion     Aug 25th, Jan 2000 s/p birth of son    PONV (postoperative nausea and vomiting)     Retention of urine      Past Surgical History:    Past Surgical History:   Procedure Laterality Date    COLECTOMY N/A 08/25/2022    SIGMOIDECTOMY WITH ABDOMINAL HYSTERECTOMY WITH BSO, END COLOSTOMY, ENTERECTOMY, CYSTOSCOPY WITH URETERAL STENT PLACEMENT performed by Mindy Zaman MD at [address] [place] [address], COLON, DIAGNOSTIC      HYSTERECTOMY (CERVIX STATUS UNKNOWN)      SKIN BIOPSY      on back unsure date    TONSILLECTOMY      at age 23       Allergies:    Bactrim [sulfamethoxazole-trimethoprim], Ciprofloxacin, Codeine, Demerol hcl [meperidine], and Dilaudid [hydromorphone]    Medications Prior to Admission:    Current Outpatient Medications   Medication Sig Dispense Refill    acetaminophen (TYLENOL) 500 MG tablet Take 500 mg by mouth every 6 hours as needed for Pain      omeprazole (PRILOSEC) 10 MG delayed release capsule Take 10 mg by mouth daily      vancomycin (VANCOCIN) 125 MG capsule Take 1 capsule by mouth 4 times daily for 10 days 40 capsule 0    oxybutynin (DITROPAN XL) 10 MG extended release tablet Take 1 tablet by mouth daily 30 tablet 0    ondansetron (ZOFRAN) 4 MG tablet Take 1 tablet by mouth every 8 hours as needed for Nausea or Vomiting 10 tablet 0    Multiple Vitamins-Minerals (THERAPEUTIC MULTIVITAMIN-MINERALS) tablet Take 1 tablet by mouth daily (Patient not taking: Reported on 10/6/2022)       No current facility-administered medications for this encounter. Social History:   Social History     Socioeconomic History    Marital status:      Spouse name: Not on file    Number of children: Not on file    Years of education: Not on file    Highest education level: Not on file   Occupational History    Not on file   Tobacco Use    Smoking status: Former     Packs/day: 1.50     Years: 42.00     Pack years: 63.00     Types: Cigarettes     Start date: 10/15/1980     Quit date: 2022     Years since quittin.1    Smokeless tobacco: Never   Vaping Use    Vaping Use: Never used   Substance and Sexual Activity    Alcohol use: Not Currently     Comment: rarely    Drug use: Never    Sexual activity: Not Currently   Other Topics Concern    Not on file   Social History Narrative    Not on file     Social Determinants of Health     Financial Resource Strain: Not on file   Food Insecurity: Not on file   Transportation Needs: Not on file   Physical Activity: Not on file   Stress: Not on file   Social Connections: Not on file   Intimate Partner Violence: Not on file   Housing Stability: Not on file       Family History:       Problem Relation Age of Onset    High Blood Pressure Mother     Cancer Father         lung cancer    Heart Disease Father     Diabetes Father     Diabetes Brother     Cancer Brother         lung cancer    High Blood Pressure Brother     High Blood Pressure Brother     No Known Problems Son     No Known Problems Daughter        Review of Systems   Constitutional:  Negative for appetite change, chills, diaphoresis, fatigue, fever and unexpected weight change (40lbs). HENT:  Negative for congestion, ear discharge, ear pain, hearing loss, mouth sores, nosebleeds, postnasal drip, rhinorrhea, sinus pressure, sinus pain, sneezing, sore throat, tinnitus and trouble swallowing.     Eyes:  Negative for photophobia, pain, discharge, redness, itching and visual disturbance. Glasses   Respiratory:  Negative for cough, chest tightness, shortness of breath and wheezing. Cardiovascular:  Negative for chest pain, palpitations and leg swelling. Gastrointestinal:  Negative for abdominal distention, abdominal pain, blood in stool, constipation, diarrhea, nausea and vomiting. Pt reports she has a colostomy-denies bloating or pain. Reports her site is WNL and draining soft brown stool. Endocrine: Negative for cold intolerance and heat intolerance. Genitourinary:  Positive for difficulty urinating, dysuria, frequency, hematuria, pelvic pain and urgency. Negative for decreased urine volume. Pt complains of frequency, urgency, burning, and pain today. She reports she has seen small amounts of blood in her urine. Pt reports she gave a urine sample again today prior to PAT as requested by Dr. Angel Hoskins. Pt reports she finished antibiotics for UTI last week. Musculoskeletal:  Positive for back pain (lower back) and gait problem (pain affects walking). Negative for arthralgias, myalgias, neck pain and neck stiffness. Skin:  Negative for rash and wound. Allergic/Immunologic: Negative. Neurological:  Negative for dizziness, tremors, seizures, weakness, light-headedness, numbness and headaches. Hematological:  Does not bruise/bleed easily. Psychiatric/Behavioral:  The patient is nervous/anxious. Hx anxiety      Vitals:  /65   Pulse 56   Temp 98.9 °F (37.2 °C) (Temporal)   Resp 16   Ht 5' 4.5\" (1.638 m)   Wt 136 lb (61.7 kg)   LMP  (LMP Unknown)   SpO2 98%   BMI 22.98 kg/m²       Physical Exam  Constitutional:       General: She is not in acute distress. Appearance: She is not ill-appearing, toxic-appearing or diaphoretic. HENT:      Head: Normocephalic. Right Ear: Tympanic membrane, ear canal and external ear normal. There is no impacted cerumen.       Left Ear: Tympanic membrane, ear canal and external ear normal. There is no impacted cerumen. Nose: Nose normal. No congestion or rhinorrhea. Mouth/Throat:      Mouth: Mucous membranes are moist.      Pharynx: Oropharynx is clear. No oropharyngeal exudate or posterior oropharyngeal erythema. Eyes:      General: No scleral icterus. Right eye: No discharge. Left eye: No discharge. Extraocular Movements: Extraocular movements intact. Conjunctiva/sclera: Conjunctivae normal.      Pupils: Pupils are equal, round, and reactive to light. Neck:      Vascular: No carotid bruit. Cardiovascular:      Rate and Rhythm: Normal rate and regular rhythm. Pulses: Normal pulses. Heart sounds: Normal heart sounds. No murmur heard. Pulmonary:      Effort: Pulmonary effort is normal. No respiratory distress. Breath sounds: Normal breath sounds. No wheezing. Abdominal:      General: Bowel sounds are normal. There is no distension. Palpations: Abdomen is soft. Tenderness: no abdominal tenderness There is no right CVA tenderness, left CVA tenderness or guarding. Genitourinary:     Comments: Pt is s/p rectosigmoid resection 8/25/22-has colostomy. Draining soft brown stool. Stoma red WNL. Ostomy bag intact. No rash or redness around site. Musculoskeletal:         General: No swelling. Normal range of motion. Cervical back: Normal range of motion. No rigidity. Right lower leg: No edema. Left lower leg: No edema. Lymphadenopathy:      Cervical: No cervical adenopathy. Skin:     General: Skin is warm and dry. Capillary Refill: Capillary refill takes less than 2 seconds. Coloration: Skin is not jaundiced. Findings: No bruising, erythema or rash. Neurological:      General: No focal deficit present. Mental Status: She is alert and oriented to person, place, and time. Motor: No weakness.       Gait: Gait normal.   Psychiatric:         Mood and Affect: Mood normal.         Behavior: Behavior normal. Thought Content: Thought content normal.         Judgment: Judgment normal.       Assessment:  61 y.o. patient with   Patient Active Problem List   Diagnosis    Intestinal obstruction (HCC)    Diverticular disease    Ureteral transection of left native kidney, initial encounter      with planned surgery as above. Plan:  Preoperative workup as follows: CBC with diff, BMP, INR, PT, PTT  2.    Scheduled for: cystoscopy with left retrograde pyelogram and possible left double J stent removal or replacement on 10/10/22    CHANCE Johnson - CNP  10/6/2022  4:51 PM

## 2022-10-07 DIAGNOSIS — R30.0 BURNING WITH URINATION: Primary | ICD-10-CM

## 2022-10-07 RX ORDER — CIPROFLOXACIN 500 MG/1
500 TABLET, FILM COATED ORAL 2 TIMES DAILY
Qty: 14 TABLET | Refills: 0 | Status: SHIPPED | OUTPATIENT
Start: 2022-10-07 | End: 2022-11-01

## 2022-10-08 LAB — URINE CULTURE, ROUTINE: NORMAL

## 2022-10-10 ENCOUNTER — ANESTHESIA (OUTPATIENT)
Dept: OPERATING ROOM | Age: 57
End: 2022-10-10
Payer: COMMERCIAL

## 2022-10-10 ENCOUNTER — ANESTHESIA EVENT (OUTPATIENT)
Dept: OPERATING ROOM | Age: 57
End: 2022-10-10
Payer: COMMERCIAL

## 2022-10-10 ENCOUNTER — HOSPITAL ENCOUNTER (OUTPATIENT)
Age: 57
Setting detail: OUTPATIENT SURGERY
Discharge: HOME | End: 2022-10-10
Attending: UROLOGY | Admitting: UROLOGY
Payer: COMMERCIAL

## 2022-10-10 ENCOUNTER — HOSPITAL ENCOUNTER (OUTPATIENT)
Dept: GENERAL RADIOLOGY | Age: 57
Setting detail: OUTPATIENT SURGERY
Discharge: HOME | End: 2022-10-12
Attending: UROLOGY
Payer: COMMERCIAL

## 2022-10-10 VITALS
RESPIRATION RATE: 16 BRPM | SYSTOLIC BLOOD PRESSURE: 140 MMHG | HEART RATE: 52 BPM | DIASTOLIC BLOOD PRESSURE: 78 MMHG | OXYGEN SATURATION: 98 % | TEMPERATURE: 97.1 F

## 2022-10-10 DIAGNOSIS — R52 PAIN: ICD-10-CM

## 2022-10-10 PROBLEM — S37.10XA LEFT URETERAL INJURY: Status: ACTIVE | Noted: 2022-10-10

## 2022-10-10 PROCEDURE — 2709999900 HC NON-CHARGEABLE SUPPLY: Performed by: UROLOGY

## 2022-10-10 PROCEDURE — 7100000011 HC PHASE II RECOVERY - ADDTL 15 MIN: Performed by: UROLOGY

## 2022-10-10 PROCEDURE — 2580000003 HC RX 258

## 2022-10-10 PROCEDURE — 7100000001 HC PACU RECOVERY - ADDTL 15 MIN: Performed by: UROLOGY

## 2022-10-10 PROCEDURE — 6360000002 HC RX W HCPCS: Performed by: ANESTHESIOLOGY

## 2022-10-10 PROCEDURE — C2625 STENT, NON-COR, TEM W/DEL SY: HCPCS | Performed by: UROLOGY

## 2022-10-10 PROCEDURE — 3700000000 HC ANESTHESIA ATTENDED CARE: Performed by: UROLOGY

## 2022-10-10 PROCEDURE — 2580000003 HC RX 258: Performed by: UROLOGY

## 2022-10-10 PROCEDURE — 3700000001 HC ADD 15 MINUTES (ANESTHESIA): Performed by: UROLOGY

## 2022-10-10 PROCEDURE — 6360000002 HC RX W HCPCS: Performed by: UROLOGY

## 2022-10-10 PROCEDURE — 52332 CYSTOSCOPY AND TREATMENT: CPT | Performed by: UROLOGY

## 2022-10-10 PROCEDURE — 7100000010 HC PHASE II RECOVERY - FIRST 15 MIN: Performed by: UROLOGY

## 2022-10-10 PROCEDURE — 3600000004 HC SURGERY LEVEL 4 BASE: Performed by: UROLOGY

## 2022-10-10 PROCEDURE — C1769 GUIDE WIRE: HCPCS | Performed by: UROLOGY

## 2022-10-10 PROCEDURE — 6360000002 HC RX W HCPCS

## 2022-10-10 PROCEDURE — 6360000004 HC RX CONTRAST MEDICATION: Performed by: UROLOGY

## 2022-10-10 PROCEDURE — 3600000014 HC SURGERY LEVEL 4 ADDTL 15MIN: Performed by: UROLOGY

## 2022-10-10 PROCEDURE — 76000 FLUOROSCOPY <1 HR PHYS/QHP: CPT

## 2022-10-10 PROCEDURE — 7100000000 HC PACU RECOVERY - FIRST 15 MIN: Performed by: UROLOGY

## 2022-10-10 PROCEDURE — 6370000000 HC RX 637 (ALT 250 FOR IP): Performed by: UROLOGY

## 2022-10-10 DEVICE — STENT URET 47FR L26CM DBL PIGTAILS LUBRICIOUS COAT TAPR TIP: Type: IMPLANTABLE DEVICE | Site: URETER | Status: FUNCTIONAL

## 2022-10-10 RX ORDER — SODIUM CHLORIDE 9 MG/ML
25 INJECTION, SOLUTION INTRAVENOUS PRN
Status: DISCONTINUED | OUTPATIENT
Start: 2022-10-10 | End: 2022-10-10 | Stop reason: HOSPADM

## 2022-10-10 RX ORDER — METOCLOPRAMIDE HYDROCHLORIDE 5 MG/ML
10 INJECTION INTRAMUSCULAR; INTRAVENOUS
Status: DISCONTINUED | OUTPATIENT
Start: 2022-10-10 | End: 2022-10-10 | Stop reason: HOSPADM

## 2022-10-10 RX ORDER — MAGNESIUM HYDROXIDE 1200 MG/15ML
LIQUID ORAL PRN
Status: DISCONTINUED | OUTPATIENT
Start: 2022-10-10 | End: 2022-10-10 | Stop reason: ALTCHOICE

## 2022-10-10 RX ORDER — CIPROFLOXACIN 2 MG/ML
400 INJECTION, SOLUTION INTRAVENOUS EVERY 12 HOURS
Status: DISCONTINUED | OUTPATIENT
Start: 2022-10-10 | End: 2022-10-10 | Stop reason: HOSPADM

## 2022-10-10 RX ORDER — DEXAMETHASONE SODIUM PHOSPHATE 4 MG/ML
INJECTION, SOLUTION INTRA-ARTICULAR; INTRALESIONAL; INTRAMUSCULAR; INTRAVENOUS; SOFT TISSUE PRN
Status: DISCONTINUED | OUTPATIENT
Start: 2022-10-10 | End: 2022-10-10 | Stop reason: SDUPTHER

## 2022-10-10 RX ORDER — OXYCODONE HYDROCHLORIDE 5 MG/1
10 TABLET ORAL PRN
Status: DISCONTINUED | OUTPATIENT
Start: 2022-10-10 | End: 2022-10-10 | Stop reason: HOSPADM

## 2022-10-10 RX ORDER — SODIUM CHLORIDE 0.9 % (FLUSH) 0.9 %
5-40 SYRINGE (ML) INJECTION EVERY 12 HOURS SCHEDULED
Status: DISCONTINUED | OUTPATIENT
Start: 2022-10-10 | End: 2022-10-10 | Stop reason: HOSPADM

## 2022-10-10 RX ORDER — SODIUM CHLORIDE 9 MG/ML
INJECTION, SOLUTION INTRAVENOUS PRN
Status: DISCONTINUED | OUTPATIENT
Start: 2022-10-10 | End: 2022-10-10 | Stop reason: HOSPADM

## 2022-10-10 RX ORDER — DIPHENHYDRAMINE HYDROCHLORIDE 50 MG/ML
12.5 INJECTION INTRAMUSCULAR; INTRAVENOUS
Status: DISCONTINUED | OUTPATIENT
Start: 2022-10-10 | End: 2022-10-10 | Stop reason: HOSPADM

## 2022-10-10 RX ORDER — ONDANSETRON 2 MG/ML
4 INJECTION INTRAMUSCULAR; INTRAVENOUS
Status: DISCONTINUED | OUTPATIENT
Start: 2022-10-10 | End: 2022-10-10 | Stop reason: HOSPADM

## 2022-10-10 RX ORDER — MEPERIDINE HYDROCHLORIDE 25 MG/ML
12.5 INJECTION INTRAMUSCULAR; INTRAVENOUS; SUBCUTANEOUS
Status: DISCONTINUED | OUTPATIENT
Start: 2022-10-10 | End: 2022-10-10 | Stop reason: HOSPADM

## 2022-10-10 RX ORDER — PROPOFOL 10 MG/ML
INJECTION, EMULSION INTRAVENOUS PRN
Status: DISCONTINUED | OUTPATIENT
Start: 2022-10-10 | End: 2022-10-10 | Stop reason: SDUPTHER

## 2022-10-10 RX ORDER — SODIUM CHLORIDE, SODIUM LACTATE, POTASSIUM CHLORIDE, CALCIUM CHLORIDE 600; 310; 30; 20 MG/100ML; MG/100ML; MG/100ML; MG/100ML
INJECTION, SOLUTION INTRAVENOUS CONTINUOUS
Status: DISCONTINUED | OUTPATIENT
Start: 2022-10-10 | End: 2022-10-10 | Stop reason: HOSPADM

## 2022-10-10 RX ORDER — OXYCODONE HYDROCHLORIDE 5 MG/1
5 TABLET ORAL PRN
Status: DISCONTINUED | OUTPATIENT
Start: 2022-10-10 | End: 2022-10-10 | Stop reason: HOSPADM

## 2022-10-10 RX ORDER — KETOROLAC TROMETHAMINE 30 MG/ML
30 INJECTION, SOLUTION INTRAMUSCULAR; INTRAVENOUS ONCE
Status: COMPLETED | OUTPATIENT
Start: 2022-10-10 | End: 2022-10-10

## 2022-10-10 RX ORDER — LIDOCAINE HYDROCHLORIDE 10 MG/ML
1 INJECTION, SOLUTION EPIDURAL; INFILTRATION; INTRACAUDAL; PERINEURAL
Status: DISCONTINUED | OUTPATIENT
Start: 2022-10-10 | End: 2022-10-10 | Stop reason: HOSPADM

## 2022-10-10 RX ORDER — LIDOCAINE HYDROCHLORIDE 20 MG/ML
JELLY TOPICAL PRN
Status: DISCONTINUED | OUTPATIENT
Start: 2022-10-10 | End: 2022-10-10 | Stop reason: ALTCHOICE

## 2022-10-10 RX ORDER — SODIUM CHLORIDE 0.9 % (FLUSH) 0.9 %
5-40 SYRINGE (ML) INJECTION PRN
Status: DISCONTINUED | OUTPATIENT
Start: 2022-10-10 | End: 2022-10-10 | Stop reason: HOSPADM

## 2022-10-10 RX ORDER — ONDANSETRON 2 MG/ML
INJECTION INTRAMUSCULAR; INTRAVENOUS PRN
Status: DISCONTINUED | OUTPATIENT
Start: 2022-10-10 | End: 2022-10-10 | Stop reason: SDUPTHER

## 2022-10-10 RX ORDER — FENTANYL CITRATE 50 UG/ML
50 INJECTION, SOLUTION INTRAMUSCULAR; INTRAVENOUS EVERY 10 MIN PRN
Status: DISCONTINUED | OUTPATIENT
Start: 2022-10-10 | End: 2022-10-10 | Stop reason: HOSPADM

## 2022-10-10 RX ADMIN — DEXAMETHASONE SODIUM PHOSPHATE 10 MG: 4 INJECTION, SOLUTION INTRAMUSCULAR; INTRAVENOUS at 08:33

## 2022-10-10 RX ADMIN — CIPROFLOXACIN 400 MG: 2 INJECTION, SOLUTION INTRAVENOUS at 08:54

## 2022-10-10 RX ADMIN — ONDANSETRON 4 MG: 2 INJECTION INTRAMUSCULAR; INTRAVENOUS at 08:33

## 2022-10-10 RX ADMIN — SODIUM CHLORIDE, POTASSIUM CHLORIDE, SODIUM LACTATE AND CALCIUM CHLORIDE: 600; 310; 30; 20 INJECTION, SOLUTION INTRAVENOUS at 08:09

## 2022-10-10 RX ADMIN — PROPOFOL 200 MG: 10 INJECTION, EMULSION INTRAVENOUS at 08:33

## 2022-10-10 RX ADMIN — KETOROLAC TROMETHAMINE 30 MG: 30 INJECTION, SOLUTION INTRAMUSCULAR; INTRAVENOUS at 10:02

## 2022-10-10 ASSESSMENT — PAIN SCALES - GENERAL
PAINLEVEL_OUTOF10: 5
PAINLEVEL_OUTOF10: 6
PAINLEVEL_OUTOF10: 8

## 2022-10-10 ASSESSMENT — PAIN DESCRIPTION - ORIENTATION
ORIENTATION: LOWER

## 2022-10-10 ASSESSMENT — PAIN DESCRIPTION - LOCATION
LOCATION: ABDOMEN

## 2022-10-10 ASSESSMENT — PAIN DESCRIPTION - DESCRIPTORS
DESCRIPTORS: SPASM
DESCRIPTORS: SPASM

## 2022-10-10 NOTE — DISCHARGE INSTRUCTIONS
D/C to home per PACU protocol. Drink plenty of liquids and no heavy activity. F/U 11/1/22 in my office at 9 am. Ustell 120 mg po every 8 hrs prn, #42, 2 refills. Stop Ditropan. Take one Cipro tonight at 9 pm and no further needed.

## 2022-10-10 NOTE — ANESTHESIA POSTPROCEDURE EVALUATION
Department of Anesthesiology  Postprocedure Note    Patient: Lori Hernandez  MRN: [id number]  YOB: 1965  Date of evaluation: 10/10/2022      Procedure Summary     Date: 10/10/22 Room / Location: [facility]    Anesthesia Start: 5592 Anesthesia Stop:     Procedure: CYSTOSCOPY WITH LEFT RETROGRADE PYELOGRAM AND POSSIBLE LEFT DOUBLE J STENT REMOVAL AND REPLACEMENT (Left) Diagnosis:       Transection of ureter of left native kidney, initial encounter      (URETERAL TRANSECTION OF LEFT NATIVE KIDNEY)    Surgeons: Pastor Greg MD Responsible Provider: Enrike Hyman MD    Anesthesia Type: general ASA Status: 2          Anesthesia Type: No value filed.     Minal Phase I:      Minal Phase II:        Anesthesia Post Evaluation    Patient location during evaluation: PACU  Patient participation: complete - patient participated  Level of consciousness: awake  Pain score: 0  Airway patency: patent  Nausea & Vomiting: no nausea  Complications: no  Cardiovascular status: hemodynamically stable  Respiratory status: acceptable  Hydration status: euvolemic

## 2022-10-10 NOTE — PROGRESS NOTES
CLINICAL PHARMACY NOTE: MEDS TO BEDS    Total # of Prescriptions Filled: 2   The following medications were delivered to the patient:  Ketorolac 10 mg Tab  Ustelle Cap    Additional Documentation:

## 2022-10-10 NOTE — ANESTHESIA PRE PROCEDURE
Department of Anesthesiology  Preprocedure Note       Name:  Raliegh Brunner   Age:  61 y.o.  :  1965                                          MRN:  [id number]         Date:  10/10/2022      Surgeon: [place]):  Kevin Bell MD    Procedure: Procedure(s):  CYSTOSCOPY WITH LEFT RETROGRADE PYELOGRAM AND POSSIBLE LEFT DOUBLE J STENT REMOVAL OR REPLACEMENT    Medications prior to admission:   Prior to Admission medications    Medication Sig Start Date End Date Taking?  Authorizing Provider   ciprofloxacin (CIPRO) 500 MG tablet Take 1 tablet by mouth 2 times daily 10/7/22   Kevin Bell MD   acetaminophen (TYLENOL) 500 MG tablet Take 500 mg by mouth every 6 hours as needed for Pain    Historical Provider, MD   omeprazole (PRILOSEC) 10 MG delayed release capsule Take 10 mg by mouth daily    Historical Provider, MD   oxybutynin (DITROPAN XL) 10 MG extended release tablet Take 1 tablet by mouth daily 22   Kevin Bell MD   ondansetron (ZOFRAN) 4 MG tablet Take 1 tablet by mouth every 8 hours as needed for Nausea or Vomiting 9/15/22   Kevin Bell MD   Multiple Vitamins-Minerals (THERAPEUTIC MULTIVITAMIN-MINERALS) tablet Take 1 tablet by mouth daily  Patient not taking: Reported on 10/6/2022    Historical Provider, MD       Current medications:    Current Facility-Administered Medications   Medication Dose Route Frequency Provider Last Rate Last Admin    ciprofloxacin (CIPRO) IVPB 400 mg  400 mg IntraVENous Q12H Delia Chandan, APRN - CNP        lidocaine PF 1 % injection 1 mL  1 mL IntraDERmal Once PRN Deliaromario Hawley APRN - CNP        lactated ringers infusion   IntraVENous Continuous Delia Chandan, APRN - CNP        sodium chloride flush 0.9 % injection 5-40 mL  5-40 mL IntraVENous 2 times per day Delia Chandan, APRN - CNP        sodium chloride flush 0.9 % injection 5-40 mL  5-40 mL IntraVENous PRN Delia , APRN - CNP        0.9 % sodium chloride infusion   IntraVENous PRN Delia Hawley, APRN - CNP           Allergies:     Allergies   Allergen Reactions    Bactrim [Sulfamethoxazole-Trimethoprim] Nausea And Vomiting    Ciprofloxacin Nausea And Vomiting    Codeine Nausea And Vomiting    Demerol Hcl [Meperidine] Nausea Only    Dilaudid [Hydromorphone] Nausea Only       Problem List:    Patient Active Problem List   Diagnosis Code    Intestinal obstruction ([facility] [address] K56.609    Diverticular disease K57.90    Ureteral transection of left native kidney, initial encounter S37.10XA       Past Medical History:        Diagnosis Date    Diverticulitis     History of blood transfusion     Aug 25th, 2000 s/p birth of son    PONV (postoperative nausea and vomiting)     Retention of urine        Past Surgical History:        Procedure Laterality Date    COLECTOMY N/A 2022    SIGMOIDECTOMY WITH ABDOMINAL HYSTERECTOMY WITH BSO, END COLOSTOMY, ENTERECTOMY, CYSTOSCOPY WITH URETERAL STENT PLACEMENT performed by Yuli Hogan MD at [address] [place] [address] COLONOSCOPY      ENDOSCOPY, COLON, DIAGNOSTIC      HYSTERECTOMY (CERVIX STATUS UNKNOWN)      SKIN BIOPSY      on back unsure date    TONSILLECTOMY      at age 23       Social History:    Social History     Tobacco Use    Smoking status: Former     Packs/day: 1.50     Years: 42.00     Pack years: 63.00     Types: Cigarettes     Start date: 10/15/1980     Quit date: 2022     Years since quittin.1    Smokeless tobacco: Never   Substance Use Topics    Alcohol use: Not Currently     Comment: rarely                                Counseling given: Not Answered      Vital Signs (Current):   Vitals:    10/10/22 0730   BP: 111/64   Pulse: 55   Resp: 16   Temp: 97.8 °F (36.6 °C)   TempSrc: Temporal   SpO2: 98%                                              BP Readings from Last 3 Encounters:   10/10/22 111/64   10/06/22 105/65   22 117/72       NPO Status: Time of last liquid consumption:                         Time of last solid consumption: 1830                        Date of last liquid consumption: 10/09/22                        Date of last solid food consumption: 10/09/22    BMI:   Wt Readings from Last 3 Encounters:   10/06/22 136 lb (61.7 kg)   09/23/22 130 lb (59 kg)   09/23/22 130 lb 11.2 oz (59.3 kg)     There is no height or weight on file to calculate BMI.    CBC:   Lab Results   Component Value Date/Time    WBC 7.6 10/06/2022 03:19 PM    RBC 3.77 10/06/2022 03:19 PM    HGB 10.4 10/06/2022 03:19 PM    HCT 32.4 10/06/2022 03:19 PM    MCV 85.8 10/06/2022 03:19 PM    RDW 17.7 10/06/2022 03:19 PM     10/06/2022 03:19 PM       CMP:   Lab Results   Component Value Date/Time     10/06/2022 03:18 PM    K 4.4 10/06/2022 03:18 PM    K 3.6 08/27/2022 04:22 AM     10/06/2022 03:18 PM    CO2 30 10/06/2022 03:18 PM    BUN 16 10/06/2022 03:18 PM    CREATININE 0.57 10/06/2022 03:18 PM    GFRAA >60.0 10/06/2022 03:18 PM    LABGLOM >60.0 10/06/2022 03:18 PM    GLUCOSE 100 10/06/2022 03:18 PM    PROT 7.0 09/23/2022 10:30 AM    CALCIUM 9.7 10/06/2022 03:18 PM    BILITOT 0.3 09/23/2022 10:30 AM    ALKPHOS 236 09/23/2022 10:30 AM    AST 11 09/23/2022 10:30 AM    ALT 17 09/23/2022 10:30 AM       POC Tests: No results for input(s): POCGLU, POCNA, POCK, POCCL, POCBUN, POCHEMO, POCHCT in the last 72 hours.     Coags:   Lab Results   Component Value Date/Time    PROTIME 13.4 10/06/2022 03:15 PM    INR 1.0 10/06/2022 03:15 PM    APTT 32.4 10/06/2022 03:15 PM       HCG (If Applicable):   Lab Results   Component Value Date    PREGTESTUR Negative 11/09/2016        ABGs: No results found for: PHART, PO2ART, TYH1IHX, RFQ2GGF, BEART, X3GBZBPD     Type & Screen (If Applicable):  No results found for: LABABO, LABRH    Drug/Infectious Status (If Applicable):  No results found for: HIV, HEPCAB    COVID-19 Screening (If Applicable): No results found for: COVID19        Anesthesia Evaluation  Patient summary reviewed and Nursing notes reviewed   history of anesthetic complications: PONV. Airway: Mallampati: II  TM distance: >3 FB   Neck ROM: full  Mouth opening: > = 3 FB   Dental: normal exam         Pulmonary:Negative Pulmonary ROS and normal exam                               Cardiovascular:Negative CV ROS                      Neuro/Psych:   Negative Neuro/Psych ROS              GI/Hepatic/Renal: Neg GI/Hepatic/Renal ROS            Endo/Other: Negative Endo/Other ROS                    Abdominal:             Vascular: negative vascular ROS. Other Findings:           Anesthesia Plan      general     ASA 2       Induction: intravenous. MIPS: Prophylactic antiemetics administered. Anesthetic plan and risks discussed with patient.                         Agustín Fernandes MD   10/10/2022

## 2022-10-10 NOTE — BRIEF OP NOTE
Brief Postoperative Note      Patient: Magaly Deras  YOB: 1965  MRN: [id number]    Date of Procedure: 10/10/2022    Pre-Op Diagnosis: URETERAL TRANSECTION OF LEFT NATIVE KIDNEY    Post-Op Diagnosis: Same       Procedure: Cystoscopy with Lt retrograde pyelogram and Lt double J stent replacement. Surgeon(s):  Carmela Montalvo MD    Assistant: Shruthi Borja. Damon    Anesthesia: General/Local    Estimated Blood Loss (mL): Minimal    Complications: None    Specimens: stent removed in toto    Implants:  Implant Name Type Inv. Item Serial No.  Lot No. LRB No. Used Action   STENT URET 47FR L26CM DBL [employer] T[employer] TIP - [id number]  STENT URET 47FR L26CM DBL [employer] TIP  [employer]- [id number] Left 1 Implanted         Drains:   Colostomy LUQ (Active)       Findings: Some bladder inflammation noted due to stent.  Prior stent removed and evidence for small are of contrast extravasation in distal ureter and stent replaced with 4.7 Fr x 26 cm stent    Electronically signed by Carmela Montalvo MD on 10/10/2022 at 8:58 AM

## 2022-10-11 NOTE — OP NOTE
[address]                      [address], [address] [facility]                                OPERATIVE REPORT    PATIENT NAME: Lashonda Chapman                      :        1965  MED REC NO:   [id number]                            ROOM:  ACCOUNT NO:   [de-identified]                           ADMIT DATE: 10/10/2022  PROVIDER:     Kierra Oliver MD    DATE OF PROCEDURE:  10/10/2022    PREOPERATIVE DIAGNOSIS:  Left intraoperative ureteral  injury/transection. POSTOPERATIVE DIAGNOSIS:  Left intraoperative ureteral  injury/transection. PROCEDURE:  Cystoscopy with left double-J stent extraction, left  retrograde pyelogram under fluoroscopic visualization, left 4.7 Serbian x  26 cm double-J stent reinsertion. SURGEON:  Mark Santacruz MD    ANESTHESIA:  General with a 2% lidocaine Uro-Jet per urethra. ESTIMATED BLOOD LOSS:  Minimal.    COMPLICATIONS:  None. CONDITION:  Stable to recovery room. BRIEF CLINICAL NOTE:  This is a 65-year-old female underwent a  rectosigmoid resection with abdominal hysterectomy and during the  procedure by Dr. Agnes Mascorro, a left ureteral transection was identified. She underwent repair of this with cystoscopy and left stent insertion on  2022. She had significant irritative voiding symptoms related to  the stent, had a recent UTI treated and was requesting assessment for  stent removal.  She had a preoperative urine culture which was negative. She had a CT scan showing some left pelvic sidewall inflammation and a  small fluid collection, which had improved since a prior CAT scan. It  was recommended she undergo cystoscopy, left double-J stent extraction,  left retrograde pyelogram, and possible left double-J stent reinsertion.   If there was any evidence for either stricturing or nonhealing of the  repair with extravasation of contrast and she understood the risks and  benefits of the planned procedure and is willing to proceed as planned. She had refrained from taking anticoagulation type medications prior to  the procedure. A time-out was performed at the beginning of the case. A family discussion was held at the end of the case. OPERATIVE PROCEDURE:  The patient was brought to the operating room with  an informed consent signed, IV running. After successful induction of  general anesthesia, placed in dorsal lithotomy position on the operating  table. She was prepped and draped sterilely. 2% lidocaine Uro-Jet was  guided per urethra. A 21-Citizen of [place] cystoscope was then guided  atraumatically through the urethra into the bladder. The double-J stent  was emanating from the left ureteral orifice. It was mildly calcified. At this point, a flexible grasping forceps was used to grasp the end of  the stent and was pulled out with the cystoscope through the meatus. At  this point then a 0.035 Glidewire was advanced through the stent into  the upper pole of the collecting system and coiled there. The  previously placed stent was removed. The cystoscope was back loaded  over the wire. A 10-Citizen of [place] dual-lumen ureteral catheter was advanced  about a centimeter into the ureter and a left retrograde pyelogram was  performed. Initially, there appeared to be a smooth ureter without any  significant irregularity or stricturing. The collecting system showed  some mild dilation related to the stent after waiting for about a  minute. There was noted to be a small amount of contrast emanating from  the left ureter in the distal pelvic region consistent with not complete  healing of the repair at this point and consistent with extravasation of  contrast necessitating stent reinsertion. At this point, the end-hole  catheter was removed and a 4.7 Citizen of [place] x 26 cm double-J stent was  advanced over the Glidewire. Once a good portion noted in the  collecting system of the kidney, the wire was removed.   We placed a  smaller stent because of the patient's significant discomfort and  bothersome symptoms related to the 6-Niuean stent with the hopes that  this would improve her symptomatology moving forward. The bladder  otherwise appeared unremarkable except for some mild inflammatory  changes due to the stent. At the conclusion of this, the bladder was  drained. The cystoscope was removed. The patient was awoken from  anesthesia and taken to the recovery room in stable condition. She was  given a prescription for oral Toradol and Ustell to use p.r.n. three  times a day. She will follow up in my office in three week's time. We  will consider reassessment of the area with retrograde pyelogram and  possible stent removal in 4-6 weeks depending on how the patient is  doing symptomatically with the stent and these recommendations were  discussed with the patient's  and the patient in the  postoperative area.         Krystina Grewal MD    D: 10/11/2022 9:12:42       T: 10/11/2022 9:17:10     [id number]  Job#: [id number]     Doc#: [id number]    CC:  Kimmy Colorado MD

## 2022-10-19 ENCOUNTER — TELEPHONE (OUTPATIENT)
Dept: UROLOGY | Age: 57
End: 2022-10-19

## 2022-10-19 DIAGNOSIS — S37.10XA: Primary | ICD-10-CM

## 2022-10-20 ENCOUNTER — TELEPHONE (OUTPATIENT)
Dept: UROLOGY | Age: 57
End: 2022-10-20

## 2022-10-20 DIAGNOSIS — R30.0 BURNING WITH URINATION: Primary | ICD-10-CM

## 2022-10-20 NOTE — TELEPHONE ENCOUNTER
Patient is calling stating she is still having bright red blood in urine and also having burning with frequency and was wondering if she should drop off a urine sample

## 2022-10-21 DIAGNOSIS — R30.0 BURNING WITH URINATION: ICD-10-CM

## 2022-10-23 LAB — URINE CULTURE, ROUTINE: NORMAL

## 2022-10-24 NOTE — TELEPHONE ENCOUNTER
Patient of Shruti Haynes, Urine Culture:    Component 10/21/22 1519    Urine Culture, Routine Cult,Urine:  NO GROWTH   Performed at [facility], [address] [place] [address]   ([address]           Narrative  Performed by: [address]  ORDER#: [id number]                          ORDERED BY: Lidya Fernández   SOURCE: Urine Clean Catch                  COLLECTED:  10/21/22 15:19   ANTIBIOTICS AT [place]                      RECEIVED :  10/21/22 15:27

## 2022-11-01 ENCOUNTER — OFFICE VISIT (OUTPATIENT)
Dept: UROLOGY | Age: 57
End: 2022-11-01
Payer: COMMERCIAL

## 2022-11-01 ENCOUNTER — HOSPITAL ENCOUNTER (OUTPATIENT)
Dept: GENERAL RADIOLOGY | Age: 57
Discharge: HOME | End: 2022-11-03
Payer: COMMERCIAL

## 2022-11-01 VITALS
BODY MASS INDEX: 23.66 KG/M2 | HEART RATE: 86 BPM | WEIGHT: 142 LBS | SYSTOLIC BLOOD PRESSURE: 118 MMHG | DIASTOLIC BLOOD PRESSURE: 82 MMHG | HEIGHT: 65 IN

## 2022-11-01 DIAGNOSIS — S37.10XA: ICD-10-CM

## 2022-11-01 DIAGNOSIS — S37.10XD LEFT URETERAL INJURY, SUBSEQUENT ENCOUNTER: Primary | ICD-10-CM

## 2022-11-01 PROCEDURE — 74018 RADEX ABDOMEN 1 VIEW: CPT

## 2022-11-01 PROCEDURE — 99214 OFFICE O/P EST MOD 30 MIN: CPT | Performed by: UROLOGY

## 2022-11-01 RX ORDER — METHENAMINE, SODIUM PHOSPHATE, MONOBASIC, MONOHYDRATE, PHENYL SALICYLATE, METHYLENE BLUE, AND HYOSCYAMINE SULFATE 120; 40.8; 36; 10; .12 MG/1; MG/1; MG/1; MG/1; MG/1
1 CAPSULE ORAL 4 TIMES DAILY PRN
Qty: 28 CAPSULE | Refills: 0 | Status: SHIPPED | OUTPATIENT
Start: 2022-11-01

## 2022-11-01 RX ORDER — KETOROLAC TROMETHAMINE 10 MG/1
TABLET, FILM COATED ORAL
COMMUNITY
Start: 2022-10-10

## 2022-11-01 RX ORDER — KETOROLAC TROMETHAMINE 10 MG/1
10 TABLET, FILM COATED ORAL EVERY 6 HOURS PRN
Qty: 12 TABLET | Refills: 0 | Status: SHIPPED | OUTPATIENT
Start: 2022-11-01 | End: 2023-11-01

## 2022-11-01 RX ORDER — METHENAMINE, SODIUM PHOSPHATE, MONOBASIC, MONOHYDRATE, PHENYL SALICYLATE, METHYLENE BLUE, AND HYOSCYAMINE SULFATE 120; 40.8; 36; 10; .12 MG/1; MG/1; MG/1; MG/1; MG/1
CAPSULE ORAL
COMMUNITY
Start: 2022-10-10

## 2022-11-01 ASSESSMENT — ENCOUNTER SYMPTOMS: ABDOMINAL DISTENTION: 0

## 2022-11-01 NOTE — PROGRESS NOTES
Subjective:      Patient ID: Humberto Rolle is a 61 y.o. female    HPI  This is a 60 y/o s/p Rectosigmoid resection and Lt ureteral injury repair with cystoscopy and stent on 22 and then had attempted stent removal on 10/10/22 but was found to have a small area of contrast extravasation noted on retrograde and a 4.7 Fr x 26 cm stent was replaced for more comfort as she was very bothered by the prior stent. She dd well for the first week after the stent and then the IVS and bladder spasms with dysuria and occasional hematuria returned and remains bothersome but more tolerable with use of Toradol prn and Ustelle prn. The prior incontinence has improved with the Ustelle. I reviewed the KUB and the stent is in good position.      Past Medical History:   Diagnosis Date    Diverticulitis     History of blood transfusion     Aug 25th, 2000 s/p birth of son    PONV (postoperative nausea and vomiting)     Retention of urine      Past Surgical History:   Procedure Laterality Date    BLADDER SURGERY Left 10/10/2022    CYSTOSCOPY WITH LEFT RETROGRADE PYELOGRAM AND POSSIBLE LEFT DOUBLE J STENT REMOVAL AND REPLACEMENT performed by Robbie Reyes MD at [address] [place] [address] N/A 2022    SIGMOIDECTOMY WITH ABDOMINAL HYSTERECTOMY WITH BSO, END COLOSTOMY, ENTERECTOMY, CYSTOSCOPY WITH URETERAL STENT PLACEMENT performed by Chantell Lutz MD at [address] [place] [address], [place], DIAGNOSTIC      HYSTERECTOMY (CERVIX STATUS UNKNOWN)      SKIN BIOPSY      on back unsure date    TONSILLECTOMY      at age 23     Social History     Socioeconomic History    Marital status:      Spouse name: None    Number of children: None    Years of education: None    Highest education level: None   Tobacco Use    Smoking status: Former     Packs/day: 1.50     Years: 42.00     Pack years: 63.00     Types: Cigarettes     Start date: 10/15/1980     Quit date: 2022     Years since quittin.2    Smokeless tobacco: Never   Vaping Use    Vaping Use: Never used   Substance and Sexual Activity    Alcohol use: Not Currently     Comment: rarely    Drug use: Never    Sexual activity: Not Currently     Family History   Problem Relation Age of Onset    High Blood Pressure Mother     Cancer Father         lung cancer    Heart Disease Father     Diabetes Father     Diabetes Brother     Cancer Brother         lung cancer    High Blood Pressure Brother     High Blood Pressure Brother     No Known Problems Son     No Known Problems Daughter      Current Outpatient Medications   Medication Sig Dispense Refill    acetaminophen (TYLENOL) 500 MG tablet Take 500 mg by mouth every 6 hours as needed for Pain      omeprazole (PRILOSEC) 10 MG delayed release capsule Take 10 mg by mouth daily      oxybutynin (DITROPAN XL) 10 MG extended release tablet Take 1 tablet by mouth daily 30 tablet 0    ondansetron (ZOFRAN) 4 MG tablet Take 1 tablet by mouth every 8 hours as needed for Nausea or Vomiting 10 tablet 0    Multiple Vitamins-Minerals (THERAPEUTIC MULTIVITAMIN-MINERALS) tablet Take 1 tablet by mouth daily (Patient not taking: No sig reported)       No current facility-administered medications for this visit. Bactrim [sulfamethoxazole-trimethoprim], Ciprofloxacin, Codeine, Demerol hcl [meperidine], and Dilaudid [hydromorphone]  reviewed      Review of Systems   Constitutional:  Negative for fever. Gastrointestinal:  Negative for abdominal distention. Genitourinary:  Positive for dysuria, frequency and urgency. Negative for flank pain. Objective:   Physical Exam  Abdominal:      General: There is no distension. Neurological:      Mental Status: She is alert.    Psychiatric:         Mood and Affect: Mood normal.        Culture, Urine  Order: [id number]  Status: Final result    Visible to patient: Yes (not seen)    Next appt: 11/29/2022 at 09:45 AM in Colon and Rectal Surgery (Raisa Hernandez MD)    Dx: Burning with urination Specimen Information: Urine, clean catch   0 Result Notes  Component 10/21/22 1519    Urine Culture, Routine Cult,Urine:  NO GROWTH   Performed at [facility], [address] [place] [address]   ([address]           Narrative  Performed by: [address]  ORDER#: [id number]                          ORDERED BY: Isac Canseco   SOURCE: Urine Clean Catch                  COLLECTED:  10/21/22 15:19   ANTIBIOTICS AT [place]                      RECEIVED :  10/21/22 15:27      Specimen Collected: 10/21/22 15:19 EDT Last Resulted: 10/23/22 14:11 EDT        Lab Flowsheet     Order Details     View Encounter     Lab and Collection Details     Routing     Result History     View Encounter Conversation           Result Care Coordination          Assessment: This is a 60 y/o s/p Rectosigmoid resection and Lt ureteral injury repair with cystoscopy and stent on 8/25/22 (4 weeks post-op) and had attempted stent removal on 10/10/22 which showed a small area of persistent contrast extravasation and smaller stent was replaced. I recommend attempt at stent removal about 6 weeks from time of replacement. The risks and benefits of cysto and stent removal, Lt retrograde and possible lt stent replacement were reviewed were discussed including but not limited to, infection/sepsis, pain, bleeding/transfusion, lack of efficacy and need for multiple or alternate treatments such as stent replacement or Lt percutaneous nephrostomy, urethral/bladder/ureteral injury, renal injury or loss, DVT/PE, death and pt wants to proceed as planned.         Plan:      Cystoscopy with Lt double-J stent removal and Lt retrograde pyelogram and possible Lt double-J stent replacement, GA on 11/21/22   Rocephin 1 gram IV pre-op   No ASA, NSAIDs 2 weeks prior  Urine for C/S with PAT  Orders Placed This Encounter   Medications    ketorolac (TORADOL) 10 MG tablet     Sig: Take 1 tablet by mouth every 6 hours as needed for Pain     Dispense:  12 tablet     Refill:  0    Meth-Hyo-M Bl-Na Phos-Ph Sal (USTELL) 120 MG CAPS     Sig: Take 1 tablet by mouth 4 times daily as needed (prn bladder spasms)     Dispense:  28 capsule     Refill:  0           Daija Lunsford MD

## 2022-11-14 ENCOUNTER — TELEPHONE (OUTPATIENT)
Dept: FAMILY MEDICINE CLINIC | Age: 57
End: 2022-11-14

## 2022-11-17 ENCOUNTER — OFFICE VISIT (OUTPATIENT)
Dept: FAMILY MEDICINE CLINIC | Age: 57
End: 2022-11-17
Payer: COMMERCIAL

## 2022-11-17 ENCOUNTER — HOSPITAL ENCOUNTER (OUTPATIENT)
Dept: LAB | Age: 57
Discharge: HOME | End: 2022-11-17
Payer: COMMERCIAL

## 2022-11-17 VITALS
BODY MASS INDEX: 23.79 KG/M2 | WEIGHT: 142.8 LBS | DIASTOLIC BLOOD PRESSURE: 74 MMHG | HEART RATE: 88 BPM | TEMPERATURE: 98 F | OXYGEN SATURATION: 96 % | HEIGHT: 65 IN | SYSTOLIC BLOOD PRESSURE: 118 MMHG

## 2022-11-17 DIAGNOSIS — Z01.818 PRE-OP EXAM: ICD-10-CM

## 2022-11-17 DIAGNOSIS — Z01.818 PRE-OP EXAM: Primary | ICD-10-CM

## 2022-11-17 LAB
BILIRUBIN, POC: ABNORMAL
BLOOD URINE, POC: ABNORMAL
CLARITY, POC: ABNORMAL
COLOR, POC: ABNORMAL
GLUCOSE URINE, POC: ABNORMAL
HCT VFR BLD CALC: 36.8 % (ref 37–47)
HEMOGLOBIN: 11.7 G/DL (ref 12–16)
KETONES, POC: ABNORMAL
LEUKOCYTE EST, POC: ABNORMAL
MCH RBC QN AUTO: 27.4 PG (ref 27–31.3)
MCHC RBC AUTO-ENTMCNC: 31.8 % (ref 33–37)
MCV RBC AUTO: 86.2 FL (ref 79.4–94.8)
NITRITE, POC: ABNORMAL
PDW BLD-RTO: 17.4 % (ref 11.5–14.5)
PH, POC: 6
PLATELET # BLD: 316 K/UL (ref 130–400)
PROTEIN, POC: ABNORMAL
RBC # BLD: 4.27 M/UL (ref 4.2–5.4)
SPECIFIC GRAVITY, POC: 1.03
UROBILINOGEN, POC: ABNORMAL
WBC # BLD: 6.9 K/UL (ref 4.8–10.8)

## 2022-11-17 PROCEDURE — 85027 COMPLETE CBC AUTOMATED: CPT

## 2022-11-17 PROCEDURE — 81003 URINALYSIS AUTO W/O SCOPE: CPT

## 2022-11-17 PROCEDURE — 36415 COLL VENOUS BLD VENIPUNCTURE: CPT

## 2022-11-17 PROCEDURE — 99213 OFFICE O/P EST LOW 20 MIN: CPT

## 2022-11-17 PROCEDURE — 87086 URINE CULTURE/COLONY COUNT: CPT

## 2022-11-17 SDOH — ECONOMIC STABILITY: FOOD INSECURITY: WITHIN THE PAST 12 MONTHS, YOU WORRIED THAT YOUR FOOD WOULD RUN OUT BEFORE YOU GOT MONEY TO BUY MORE.: NEVER TRUE

## 2022-11-17 SDOH — ECONOMIC STABILITY: FOOD INSECURITY: WITHIN THE PAST 12 MONTHS, THE FOOD YOU BOUGHT JUST DIDN'T LAST AND YOU DIDN'T HAVE MONEY TO GET MORE.: NEVER TRUE

## 2022-11-17 ASSESSMENT — ENCOUNTER SYMPTOMS
NAUSEA: 0
VOMITING: 0
COUGH: 0
COLOR CHANGE: 0
CHEST TIGHTNESS: 0
SINUS PRESSURE: 1
CONSTIPATION: 0
SORE THROAT: 0
SHORTNESS OF BREATH: 0
RESPIRATORY NEGATIVE: 1
DIARRHEA: 0
ABDOMINAL PAIN: 0

## 2022-11-17 ASSESSMENT — SOCIAL DETERMINANTS OF HEALTH (SDOH): HOW HARD IS IT FOR YOU TO PAY FOR THE VERY BASICS LIKE FOOD, HOUSING, MEDICAL CARE, AND HEATING?: NOT HARD AT ALL

## 2022-11-17 ASSESSMENT — PATIENT HEALTH QUESTIONNAIRE - PHQ9
3. TROUBLE FALLING OR STAYING ASLEEP: 0
8. MOVING OR SPEAKING SO SLOWLY THAT OTHER PEOPLE COULD HAVE NOTICED. OR THE OPPOSITE, BEING SO FIGETY OR RESTLESS THAT YOU HAVE BEEN MOVING AROUND A LOT MORE THAN USUAL: 0
1. LITTLE INTEREST OR PLEASURE IN DOING THINGS: 0
7. TROUBLE CONCENTRATING ON THINGS, SUCH AS READING THE NEWSPAPER OR WATCHING TELEVISION: 0
SUM OF ALL RESPONSES TO PHQ QUESTIONS 1-9: 1
4. FEELING TIRED OR HAVING LITTLE ENERGY: 0
5. POOR APPETITE OR OVEREATING: 0
6. FEELING BAD ABOUT YOURSELF - OR THAT YOU ARE A FAILURE OR HAVE LET YOURSELF OR YOUR FAMILY DOWN: 0
2. FEELING DOWN, DEPRESSED OR HOPELESS: 1
9. THOUGHTS THAT YOU WOULD BE BETTER OFF DEAD, OR OF HURTING YOURSELF: 0
SUM OF ALL RESPONSES TO PHQ QUESTIONS 1-9: 1
10. IF YOU CHECKED OFF ANY PROBLEMS, HOW DIFFICULT HAVE THESE PROBLEMS MADE IT FOR YOU TO DO YOUR WORK, TAKE CARE OF THINGS AT HOME, OR GET ALONG WITH OTHER PEOPLE: 0
SUM OF ALL RESPONSES TO PHQ QUESTIONS 1-9: 1
SUM OF ALL RESPONSES TO PHQ9 QUESTIONS 1 & 2: 1
SUM OF ALL RESPONSES TO PHQ QUESTIONS 1-9: 1

## 2022-11-17 NOTE — PROGRESS NOTES
Pre Operative  Encounter  CHIEF COMPLAINT     Mauricio Solitario is a 61 y.o. female who presents with:  Chief Complaint   Patient presents with    Pre-op Exam       [facility]     Belinda Haq presents to the office today for a preoperative consultation at the request of surgeon, Dr. Bill Hanson, who plans on performing CYSTOSCOPY WITH LEFT DOUBLE J STENT REMOVAL AND LEFT RETROGRADE PYELOGRAM, POSSIBLE LEFT DOUBLE J STENT REPLACEMENT on November 21 at [facility]. The current problem began 1 month ago, and symptoms have been worsening with time. Conservative therapy: No.         Planned anesthesia: General  Known anesthesia problems: No  Bleeding risk: Toradol, pt instructed to DC per pre-op orders unless DR Bill Hanson has instructed otherwise  Personal or FH of DVT/PE: No    Patient objection to receiving blood Products : No Objection  Previous Vaccination for COVID 19: Yes    REVIEW OF SYSTEMS     Review of Systems   Constitutional:  Negative for chills, fatigue and fever. HENT:  Positive for sinus pressure. Negative for congestion, ear pain and sore throat. Respiratory: Negative. Negative for cough, chest tightness and shortness of breath. Cardiovascular: Negative. Negative for chest pain, palpitations and leg swelling. Gastrointestinal:  Negative for abdominal pain, constipation, diarrhea, nausea and vomiting. Endocrine: Negative. Genitourinary:  Positive for frequency and pelvic pain. Negative for dysuria and urgency. Musculoskeletal: Negative. Negative for arthralgias, gait problem and myalgias. Skin: Negative. Negative for color change, rash and wound. Neurological:  Positive for headaches. Negative for dizziness and numbness. Hematological: Negative. Does not bruise/bleed easily. Psychiatric/Behavioral: Negative. Negative for confusion.     PAST MEDICAL HISTORY         Diagnosis Date    Diverticulitis     History of blood transfusion     Aug 25th, Jan 2000 s/p birth of son    BOONE (postoperative nausea and vomiting)     Retention of urine      SURGICAL HISTORY     Patient  has a past surgical history that includes colectomy (N/A, 08/25/2022); Colonoscopy; Endoscopy, colon, diagnostic; Hysterectomy; skin biopsy; Tonsillectomy; and Bladder surgery (Left, 10/10/2022). CURRENT MEDICATIONS       Previous Medications    ACETAMINOPHEN (TYLENOL) 500 MG TABLET    Take 500 mg by mouth every 6 hours as needed for Pain    KETOROLAC (TORADOL) 10 MG TABLET        KETOROLAC (TORADOL) 10 MG TABLET    Take 1 tablet by mouth every 6 hours as needed for Pain    METH-HYO-M BL-NA PHOS-PH SAL (USTELL) 120 MG CAPS        METH-HYO-M BL-NA PHOS-PH SAL (USTELL) 120 MG CAPS    Take 1 tablet by mouth 4 times daily as needed (prn bladder spasms)    MULTIPLE VITAMINS-MINERALS (THERAPEUTIC MULTIVITAMIN-MINERALS) TABLET    Take 1 tablet by mouth daily    OMEPRAZOLE (PRILOSEC) 10 MG DELAYED RELEASE CAPSULE    Take 10 mg by mouth daily    ONDANSETRON (ZOFRAN) 4 MG TABLET    Take 1 tablet by mouth every 8 hours as needed for Nausea or Vomiting    OXYBUTYNIN (DITROPAN XL) 10 MG EXTENDED RELEASE TABLET    Take 1 tablet by mouth daily     ALLERGIES     Patient is is allergic to bactrim [sulfamethoxazole-trimethoprim], ciprofloxacin, codeine, demerol hcl [meperidine], and dilaudid [hydromorphone]. FAMILY HISTORY     Patient'sfamily history includes Cancer in her brother and father; Diabetes in her brother and father; Heart Disease in her father; High Blood Pressure in her brother, brother, and mother; No Known Problems in her daughter and son. HISTORY     Patient  reports that she quit smoking about 3 months ago. Her smoking use included cigarettes. She started smoking about 42 years ago. She has a 63.00 pack-year smoking history. She has never used smokeless tobacco. She reports that she does not currently use alcohol. She reports that she does not use drugs.   PHYSICAL EXAM     VITALS  BP: 118/74, Temp: 98 °F (36.7 °C), Heart Rate: 88,  , SpO2: 96 %  Physical Exam  Constitutional:       General: She is not in acute distress. Appearance: Normal appearance. She is not ill-appearing. HENT:      Head: Normocephalic. Right Ear: Tympanic membrane normal. There is no impacted cerumen. Left Ear: Tympanic membrane normal. There is no impacted cerumen. Nose: Nose normal. No congestion. Mouth/Throat:      Mouth: Mucous membranes are moist.      Pharynx: No oropharyngeal exudate or posterior oropharyngeal erythema. Tonsils: No tonsillar exudate or tonsillar abscesses. 0 on the right. 0 on the left. Eyes:      General:         Right eye: No discharge. Left eye: No discharge. Conjunctiva/sclera: Conjunctivae normal.      Pupils: Pupils are equal, round, and reactive to light. Cardiovascular:      Rate and Rhythm: Normal rate. Pulses: Normal pulses. Heart sounds: Normal heart sounds. No murmur heard. No friction rub. No gallop. Pulmonary:      Effort: Pulmonary effort is normal. No respiratory distress. Breath sounds: Normal breath sounds. No wheezing or rhonchi. Chest:      Chest wall: No tenderness. Abdominal:      General: Bowel sounds are normal. There is no distension. Tenderness: There is abdominal tenderness. There is right CVA tenderness and left CVA tenderness. There is no guarding. Musculoskeletal:         General: No swelling, tenderness, deformity or signs of injury. Normal range of motion. Cervical back: Normal range of motion and neck supple. No tenderness. Lymphadenopathy:      Cervical: No cervical adenopathy. Skin:     General: Skin is warm and dry. Capillary Refill: Capillary refill takes less than 2 seconds. Coloration: Skin is not pale. Findings: No bruising. Neurological:      General: No focal deficit present. Mental Status: She is alert and oriented to person, place, and time. Mental status is at baseline.       Motor: No weakness. Coordination: Coordination normal.      Gait: Gait normal.   Psychiatric:         Mood and Affect: Mood normal.         Behavior: Behavior normal.       Predictors of intubation difficulty:   Morbid obesity? no    Neck range of motion: normal    Known risk factors for perioperative complications: None          Cardiographics  ECG: rhythm: normal sinus rhythm, rate=94 bpm, performed August 17, 2022            READY CARE COURSE     Orders Placed This Encounter   Procedures    Culture, Urine     Standing Status:   Future     Standing Expiration Date:   11/17/2023     Order Specific Question:   Specify (ex-cath, midstream, cysto, etc)? Answer:   midstream    CBC     Standing Status:   Future     Standing Expiration Date:   11/17/2023    POCT Urinalysis No Micro (Auto)        Labs:  No results found for this visit on 11/17/22. IMAGING:  No orders to display     Scheduled Meds:  Continuous Infusions:  PRN Meds:. PROCEDURES:  FINAL IMPRESSION      1. Pre-op exam        DISPOSITION/PLAN     Change in medication regimen before surgery: discontinue all meds the day of surgery  Use of Shabana-Hex to shower 2 days prior to surgery Not Indicated   Patient was informed that she is unable to eat or drink anything the midnight before her surgery. Informed to shower the night before or morning of surgery and not to apply any lotions or powders after showering  Patient verbally, acknowledges understanding. PATIENT REFERRED TO:  No follow-ups on file. DISCHARGE MEDICATIONS:  New Prescriptions    No medications on file     Cannot display discharge medications since this is not an admission.        Sara Bryant, APRN - CNP

## 2022-11-19 LAB — URINE CULTURE, ROUTINE: NORMAL

## 2022-11-20 ENCOUNTER — ANESTHESIA EVENT (OUTPATIENT)
Dept: OPERATING ROOM | Age: 57
End: 2022-11-20
Payer: COMMERCIAL

## 2022-11-20 NOTE — ANESTHESIA PRE PROCEDURE
Department of Anesthesiology  Preprocedure Note       Name:  Heidy Ledezma   Age:  61 y.o.  :  1965                                          MRN:  [id number]         Date:  2022      Surgeon: Juan Koo):  Frannie Castaneda MD    Procedure: Procedure(s):  CYSTOSCOPY WITH LEFT DOUBLE J STENT REMOVAL AND LEFT RETROGRADE PYELOGRAM POSSIBLE LEFT DOUBLE J STENT PLACEMENT, PAT [facility]. AVAILABLE TO MOVE UP. Medications prior to admission:   Prior to Admission medications    Medication Sig Start Date End Date Taking?  Authorizing Provider   Phenazopyridine HCl (AZO TABS PO) Take by mouth as needed   Yes Historical Provider, MD   Meth-Hyo-M Bl-Na Phos-Ph Sal (USTELL) 120 MG CAPS  10/10/22   Historical Provider, MD   ketorolac (TORADOL) 10 MG tablet  10/10/22   Historical Provider, MD   ketorolac (TORADOL) 10 MG tablet Take 1 tablet by mouth every 6 hours as needed for Pain 22  MD Luis Burkettinique Pickup Phos-Ph Jeremi (USTELL) 120 MG CAPS Take 1 tablet by mouth 4 times daily as needed (prn bladder spasms) 22   Frannie Castaneda MD   acetaminophen (TYLENOL) 500 MG tablet Take 500 mg by mouth every 6 hours as needed for Pain    Historical Provider, MD   omeprazole (PRILOSEC) 10 MG delayed release capsule Take 10 mg by mouth daily    Historical Provider, MD   oxybutynin (DITROPAN XL) 10 MG extended release tablet Take 1 tablet by mouth daily  Patient not taking: Reported on 2022   Frannie Castaneda MD   ondansetron (ZOFRAN) 4 MG tablet Take 1 tablet by mouth every 8 hours as needed for Nausea or Vomiting 9/15/22   Frannie Castaneda MD   Multiple Vitamins-Minerals (THERAPEUTIC MULTIVITAMIN-MINERALS) tablet Take 1 tablet by mouth daily  Patient not taking: Reported on 2022    Historical Provider, MD       Current medications:    Current Facility-Administered Medications   Medication Dose Route Frequency Provider Last Rate Last Admin    cefTRIAXone (ROCEPHIN) 1,000 mg in dextrose 5 % 50 mL IVPB mini-bag  1,000 mg IntraVENous On Call to [address], MD        lactated ringers infusion   IntraVENous Continuous Shen Melissa Fuentes  mL/hr at 22 0659 New Bag at 22 0659    scopolamine (TRANSDERM-SCOP) transdermal patch 1 patch  1 patch TransDERmal Q72H Kian Fuentes, DO           Allergies:     Allergies   Allergen Reactions    Bactrim [Sulfamethoxazole-Trimethoprim] Nausea And Vomiting    Ciprofloxacin Nausea And Vomiting    Codeine Nausea And Vomiting    Demerol Hcl [Meperidine] Nausea Only    Dilaudid [Hydromorphone] Nausea Only       Problem List:    Patient Active Problem List   Diagnosis Code    Intestinal obstruction ([facility] [address] K56.609    Diverticular disease K57.90    Ureteral transection of left native kidney, initial encounter S37.10XA    Ureter injury S37.10XA       Past Medical History:        Diagnosis Date    Diverticulitis     History of blood transfusion     Aug 25th, 2000 s/p birth of son    PONV (postoperative nausea and vomiting)     Retention of urine        Past Surgical History:        Procedure Laterality Date    BLADDER SURGERY Left 10/10/2022    CYSTOSCOPY WITH LEFT RETROGRADE PYELOGRAM AND POSSIBLE LEFT DOUBLE J STENT REMOVAL AND REPLACEMENT performed by Jairo Garcia MD at [address] [facility] N/A 2022    SIGMOIDECTOMY WITH ABDOMINAL HYSTERECTOMY WITH BSO, END COLOSTOMY, ENTERECTOMY, CYSTOSCOPY WITH URETERAL STENT PLACEMENT performed by Jacob Saldana MD at [address] [place] [address], DIAGNOSTIC      HYSTERECTOMY (CERVIX STATUS UNKNOWN)      SKIN BIOPSY      on back unsure date    TONSILLECTOMY      at age 23       Social History:    Social History     Tobacco Use    Smoking status: Former     Packs/day: 1.50     Years: 42.00     Pack years: 63.00     Types: Cigarettes     Start date: 10/15/1980     Quit date: 2022     Years since quittin.2  Smokeless tobacco: Never   Substance Use Topics    Alcohol use: Not Currently     Comment: rarely                                Counseling given: Not Answered      Vital Signs (Current):   Vitals:    11/21/22 0645   BP: (!) 108/54   Pulse: 80   Resp: 16   Temp: 97.3 °F (36.3 °C)   TempSrc: Temporal   SpO2: 94%   Weight: 142 lb (64.4 kg)   Height: 5' 5\" (1.651 m)                                              BP Readings from Last 3 Encounters:   11/21/22 (!) 108/54   11/17/22 118/74   11/01/22 118/82       NPO Status: Time of last liquid consumption: 2220                        Time of last solid consumption: 2100                        Date of last liquid consumption: 11/20/22                        Date of last solid food consumption: 11/20/22    BMI:   Wt Readings from Last 3 Encounters:   11/21/22 142 lb (64.4 kg)   11/17/22 142 lb 12.8 oz (64.8 kg)   11/01/22 142 lb (64.4 kg)     Body mass index is 23.63 kg/m². CBC:   Lab Results   Component Value Date/Time    WBC 6.9 11/17/2022 11:18 AM    RBC 4.27 11/17/2022 11:18 AM    HGB 11.7 11/17/2022 11:18 AM    HCT 36.8 11/17/2022 11:18 AM    MCV 86.2 11/17/2022 11:18 AM    RDW 17.4 11/17/2022 11:18 AM     11/17/2022 11:18 AM       CMP:   Lab Results   Component Value Date/Time     10/06/2022 03:18 PM    K 4.4 10/06/2022 03:18 PM    K 3.6 08/27/2022 04:22 AM     10/06/2022 03:18 PM    CO2 30 10/06/2022 03:18 PM    BUN 16 10/06/2022 03:18 PM    CREATININE 0.57 10/06/2022 03:18 PM    GFRAA >60.0 10/06/2022 03:18 PM    LABGLOM >60.0 10/06/2022 03:18 PM    GLUCOSE 100 10/06/2022 03:18 PM    PROT 7.0 09/23/2022 10:30 AM    CALCIUM 9.7 10/06/2022 03:18 PM    BILITOT 0.3 09/23/2022 10:30 AM    ALKPHOS 236 09/23/2022 10:30 AM    AST 11 09/23/2022 10:30 AM    ALT 17 09/23/2022 10:30 AM       POC Tests: No results for input(s): POCGLU, POCNA, POCK, POCCL, POCBUN, POCHEMO, POCHCT in the last 72 hours.     Coags:   Lab Results   Component Value Date/Time PROTIME 13.4 10/06/2022 03:15 PM    INR 1.0 10/06/2022 03:15 PM    APTT 32.4 10/06/2022 03:15 PM       HCG (If Applicable):   Lab Results   Component Value Date    PREGTESTUR Negative 11/09/2016        ABGs: No results found for: PHART, PO2ART, RZK6PYS, TBO4BDP, BEART, L9QDHXEG     Type & Screen (If Applicable):  No results found for: LABABO, LABRH    Drug/Infectious Status (If Applicable):  No results found for: HIV, HEPCAB    COVID-19 Screening (If Applicable): No results found for: COVID19        Anesthesia Evaluation  Patient summary reviewed and Nursing notes reviewed history of anesthetic complications:   Airway: Mallampati: II  TM distance: >3 FB   Neck ROM: full  Mouth opening: > = 3 FB   Dental: normal exam         Pulmonary:Negative Pulmonary ROS and normal exam                               Cardiovascular:Negative CV ROS  Exercise tolerance: good (>4 METS),         ECG reviewed               Beta Blocker:  Not on Beta Blocker      ROS comment: Normal sinus rhythm  Normal ECG  No previous ECGs available     Neuro/Psych:   Negative Neuro/Psych ROS              GI/Hepatic/Renal:   (+) GERD:,           Endo/Other: Negative Endo/Other ROS             Pt had PAT visit. Abdominal:             Vascular: negative vascular ROS. Other Findings:           Anesthesia Plan      general     ASA 2     (LMA)  Induction: intravenous. MIPS: Postoperative opioids intended and Prophylactic antiemetics administered. Anesthetic plan and risks discussed with patient. Plan discussed with CRNA.     Attending anesthesiologist reviewed and agrees with Domenica Moore DO   11/21/2022

## 2022-11-21 ENCOUNTER — APPOINTMENT (OUTPATIENT)
Dept: GENERAL RADIOLOGY | Age: 57
End: 2022-11-21
Attending: UROLOGY
Payer: COMMERCIAL

## 2022-11-21 ENCOUNTER — HOSPITAL ENCOUNTER (OUTPATIENT)
Age: 57
Setting detail: OUTPATIENT SURGERY
Discharge: HOME | End: 2022-11-21
Attending: UROLOGY | Admitting: UROLOGY
Payer: COMMERCIAL

## 2022-11-21 ENCOUNTER — ANESTHESIA (OUTPATIENT)
Dept: OPERATING ROOM | Age: 57
End: 2022-11-21
Payer: COMMERCIAL

## 2022-11-21 VITALS
BODY MASS INDEX: 23.66 KG/M2 | WEIGHT: 142 LBS | HEIGHT: 65 IN | HEART RATE: 72 BPM | TEMPERATURE: 96.5 F | SYSTOLIC BLOOD PRESSURE: 112 MMHG | DIASTOLIC BLOOD PRESSURE: 68 MMHG | RESPIRATION RATE: 16 BRPM | OXYGEN SATURATION: 94 %

## 2022-11-21 PROCEDURE — C1769 GUIDE WIRE: HCPCS | Performed by: UROLOGY

## 2022-11-21 PROCEDURE — 3600000014 HC SURGERY LEVEL 4 ADDTL 15MIN: Performed by: UROLOGY

## 2022-11-21 PROCEDURE — 7100000011 HC PHASE II RECOVERY - ADDTL 15 MIN: Performed by: UROLOGY

## 2022-11-21 PROCEDURE — 7100000010 HC PHASE II RECOVERY - FIRST 15 MIN: Performed by: UROLOGY

## 2022-11-21 PROCEDURE — 2709999900 HC NON-CHARGEABLE SUPPLY: Performed by: UROLOGY

## 2022-11-21 PROCEDURE — 6360000002 HC RX W HCPCS: Performed by: NURSE ANESTHETIST, CERTIFIED REGISTERED

## 2022-11-21 PROCEDURE — 3700000001 HC ADD 15 MINUTES (ANESTHESIA): Performed by: UROLOGY

## 2022-11-21 PROCEDURE — 2500000003 HC RX 250 WO HCPCS: Performed by: UROLOGY

## 2022-11-21 PROCEDURE — 2580000003 HC RX 258: Performed by: NURSE ANESTHETIST, CERTIFIED REGISTERED

## 2022-11-21 PROCEDURE — 76000 FLUOROSCOPY <1 HR PHYS/QHP: CPT

## 2022-11-21 PROCEDURE — 6360000002 HC RX W HCPCS: Performed by: UROLOGY

## 2022-11-21 PROCEDURE — 2580000003 HC RX 258: Performed by: UROLOGY

## 2022-11-21 PROCEDURE — 52005 CYSTO W/URTRL CATHJ: CPT | Performed by: UROLOGY

## 2022-11-21 PROCEDURE — 6370000000 HC RX 637 (ALT 250 FOR IP): Performed by: UROLOGY

## 2022-11-21 PROCEDURE — 2580000003 HC RX 258: Performed by: STUDENT IN AN ORGANIZED HEALTH CARE EDUCATION/TRAINING PROGRAM

## 2022-11-21 PROCEDURE — 3700000000 HC ANESTHESIA ATTENDED CARE: Performed by: UROLOGY

## 2022-11-21 PROCEDURE — 3600000004 HC SURGERY LEVEL 4 BASE: Performed by: UROLOGY

## 2022-11-21 RX ORDER — METOCLOPRAMIDE HYDROCHLORIDE 5 MG/ML
10 INJECTION INTRAMUSCULAR; INTRAVENOUS
Status: DISCONTINUED | OUTPATIENT
Start: 2022-11-21 | End: 2022-11-21 | Stop reason: HOSPADM

## 2022-11-21 RX ORDER — FENTANYL CITRATE 50 UG/ML
INJECTION, SOLUTION INTRAMUSCULAR; INTRAVENOUS PRN
Status: DISCONTINUED | OUTPATIENT
Start: 2022-11-21 | End: 2022-11-21 | Stop reason: SDUPTHER

## 2022-11-21 RX ORDER — LIDOCAINE HYDROCHLORIDE 10 MG/ML
1 INJECTION, SOLUTION EPIDURAL; INFILTRATION; INTRACAUDAL; PERINEURAL
Status: DISCONTINUED | OUTPATIENT
Start: 2022-11-21 | End: 2022-11-21 | Stop reason: HOSPADM

## 2022-11-21 RX ORDER — SCOLOPAMINE TRANSDERMAL SYSTEM 1 MG/1
1 PATCH, EXTENDED RELEASE TRANSDERMAL
Status: DISCONTINUED | OUTPATIENT
Start: 2022-11-21 | End: 2022-11-21

## 2022-11-21 RX ORDER — LIDOCAINE HYDROCHLORIDE 20 MG/ML
JELLY TOPICAL PRN
Status: DISCONTINUED | OUTPATIENT
Start: 2022-11-21 | End: 2022-11-21 | Stop reason: ALTCHOICE

## 2022-11-21 RX ORDER — SODIUM CHLORIDE 0.9 % (FLUSH) 0.9 %
5-40 SYRINGE (ML) INJECTION EVERY 12 HOURS SCHEDULED
Status: CANCELLED | OUTPATIENT
Start: 2022-11-21

## 2022-11-21 RX ORDER — OXYCODONE HYDROCHLORIDE 5 MG/1
5 TABLET ORAL PRN
Status: DISCONTINUED | OUTPATIENT
Start: 2022-11-21 | End: 2022-11-21 | Stop reason: HOSPADM

## 2022-11-21 RX ORDER — ONDANSETRON 2 MG/ML
4 INJECTION INTRAMUSCULAR; INTRAVENOUS
Status: DISCONTINUED | OUTPATIENT
Start: 2022-11-21 | End: 2022-11-21 | Stop reason: HOSPADM

## 2022-11-21 RX ORDER — DIPHENHYDRAMINE HYDROCHLORIDE 50 MG/ML
12.5 INJECTION INTRAMUSCULAR; INTRAVENOUS
Status: DISCONTINUED | OUTPATIENT
Start: 2022-11-21 | End: 2022-11-21 | Stop reason: HOSPADM

## 2022-11-21 RX ORDER — SODIUM CHLORIDE 0.9 % (FLUSH) 0.9 %
5-40 SYRINGE (ML) INJECTION PRN
Status: CANCELLED | OUTPATIENT
Start: 2022-11-21

## 2022-11-21 RX ORDER — MIDAZOLAM HYDROCHLORIDE 1 MG/ML
INJECTION INTRAMUSCULAR; INTRAVENOUS PRN
Status: DISCONTINUED | OUTPATIENT
Start: 2022-11-21 | End: 2022-11-21 | Stop reason: SDUPTHER

## 2022-11-21 RX ORDER — ONDANSETRON 2 MG/ML
INJECTION INTRAMUSCULAR; INTRAVENOUS PRN
Status: DISCONTINUED | OUTPATIENT
Start: 2022-11-21 | End: 2022-11-21 | Stop reason: SDUPTHER

## 2022-11-21 RX ORDER — PROPOFOL 10 MG/ML
INJECTION, EMULSION INTRAVENOUS PRN
Status: DISCONTINUED | OUTPATIENT
Start: 2022-11-21 | End: 2022-11-21 | Stop reason: SDUPTHER

## 2022-11-21 RX ORDER — SODIUM CHLORIDE, SODIUM LACTATE, POTASSIUM CHLORIDE, CALCIUM CHLORIDE 600; 310; 30; 20 MG/100ML; MG/100ML; MG/100ML; MG/100ML
INJECTION, SOLUTION INTRAVENOUS CONTINUOUS
Status: DISCONTINUED | OUTPATIENT
Start: 2022-11-21 | End: 2022-11-21 | Stop reason: HOSPADM

## 2022-11-21 RX ORDER — SODIUM CHLORIDE 9 MG/ML
INJECTION, SOLUTION INTRAVENOUS PRN
Status: CANCELLED | OUTPATIENT
Start: 2022-11-21

## 2022-11-21 RX ORDER — OXYCODONE HYDROCHLORIDE 5 MG/1
10 TABLET ORAL PRN
Status: DISCONTINUED | OUTPATIENT
Start: 2022-11-21 | End: 2022-11-21 | Stop reason: HOSPADM

## 2022-11-21 RX ORDER — MEPERIDINE HYDROCHLORIDE 25 MG/ML
12.5 INJECTION INTRAMUSCULAR; INTRAVENOUS; SUBCUTANEOUS
Status: DISCONTINUED | OUTPATIENT
Start: 2022-11-21 | End: 2022-11-21 | Stop reason: HOSPADM

## 2022-11-21 RX ORDER — KETOROLAC TROMETHAMINE 30 MG/ML
INJECTION, SOLUTION INTRAMUSCULAR; INTRAVENOUS PRN
Status: DISCONTINUED | OUTPATIENT
Start: 2022-11-21 | End: 2022-11-21 | Stop reason: SDUPTHER

## 2022-11-21 RX ORDER — FENTANYL CITRATE 50 UG/ML
50 INJECTION, SOLUTION INTRAMUSCULAR; INTRAVENOUS EVERY 10 MIN PRN
Status: DISCONTINUED | OUTPATIENT
Start: 2022-11-21 | End: 2022-11-21 | Stop reason: HOSPADM

## 2022-11-21 RX ORDER — SODIUM CHLORIDE, SODIUM LACTATE, POTASSIUM CHLORIDE, CALCIUM CHLORIDE 600; 310; 30; 20 MG/100ML; MG/100ML; MG/100ML; MG/100ML
INJECTION, SOLUTION INTRAVENOUS CONTINUOUS PRN
Status: DISCONTINUED | OUTPATIENT
Start: 2022-11-21 | End: 2022-11-21 | Stop reason: SDUPTHER

## 2022-11-21 RX ADMIN — PROPOFOL 70 MG: 10 INJECTION, EMULSION INTRAVENOUS at 08:10

## 2022-11-21 RX ADMIN — KETOROLAC TROMETHAMINE 30 MG: 30 INJECTION, SOLUTION INTRAMUSCULAR; INTRAVENOUS at 08:32

## 2022-11-21 RX ADMIN — PROPOFOL 20 MG: 10 INJECTION, EMULSION INTRAVENOUS at 08:14

## 2022-11-21 RX ADMIN — ONDANSETRON 4 MG: 2 INJECTION INTRAMUSCULAR; INTRAVENOUS at 08:10

## 2022-11-21 RX ADMIN — MIDAZOLAM HYDROCHLORIDE 2 MG: 1 INJECTION, SOLUTION INTRAMUSCULAR; INTRAVENOUS at 08:06

## 2022-11-21 RX ADMIN — SODIUM CHLORIDE, POTASSIUM CHLORIDE, SODIUM LACTATE AND CALCIUM CHLORIDE: 600; 310; 30; 20 INJECTION, SOLUTION INTRAVENOUS at 06:59

## 2022-11-21 RX ADMIN — CEFTRIAXONE SODIUM 1000 MG: 1 INJECTION, POWDER, FOR SOLUTION INTRAMUSCULAR; INTRAVENOUS at 08:08

## 2022-11-21 RX ADMIN — FENTANYL CITRATE 50 MCG: 50 INJECTION, SOLUTION INTRAMUSCULAR; INTRAVENOUS at 08:12

## 2022-11-21 RX ADMIN — FENTANYL CITRATE 25 MCG: 50 INJECTION, SOLUTION INTRAMUSCULAR; INTRAVENOUS at 08:21

## 2022-11-21 RX ADMIN — FENTANYL CITRATE 25 MCG: 50 INJECTION, SOLUTION INTRAMUSCULAR; INTRAVENOUS at 08:17

## 2022-11-21 RX ADMIN — SODIUM CHLORIDE, POTASSIUM CHLORIDE, SODIUM LACTATE AND CALCIUM CHLORIDE: 600; 310; 30; 20 INJECTION, SOLUTION INTRAVENOUS at 07:58

## 2022-11-21 RX ADMIN — PROPOFOL 20 MG: 10 INJECTION, EMULSION INTRAVENOUS at 08:24

## 2022-11-21 RX ADMIN — PROPOFOL 30 MG: 10 INJECTION, EMULSION INTRAVENOUS at 08:17

## 2022-11-21 ASSESSMENT — PAIN SCALES - GENERAL: PAINLEVEL_OUTOF10: 0

## 2022-11-21 NOTE — PROGRESS NOTES
Spoke with Dr Gildardo Emerson and after he spoke with pt and pt stated her N/V after surgery is better no need for scope patch, dc'd

## 2022-11-21 NOTE — BRIEF OP NOTE
Brief Postoperative Note      Patient: Martín Buckner  YOB: 1965  MRN: [id number]    Date of Procedure: 11/21/2022    Pre-Op Diagnosis: LEFT URETERAL INJURY    Post-Op Diagnosis: Same       Procedure: Cystoscopy with Lt retrograde pyelogram and Lt double-J sent removal    Surgeon(s):  Shamar Bell MD    Assistant: Sharron Damon    Anesthesia: General    Estimated Blood Loss (mL): Minimal    Complications: None    Specimens: stent removed in toto    Implants:  none      Drains:   Colostomy LUQ (Active)       Findings: Normal bladder, no evidence for extravasation on retrograde and stent removed.     Electronically signed by Shamar Bell MD on 11/21/2022 at 8:07 AM

## 2022-11-21 NOTE — PROGRESS NOTES
Discharge instructions reviewed with patient and . Both verbalized understanding of instructions with no questions.

## 2022-11-21 NOTE — ANESTHESIA POSTPROCEDURE EVALUATION
Department of Anesthesiology  Postprocedure Note    Patient: Ryley Portillo  MRN: [id number]  YOB: 1965  Date of evaluation: 11/21/2022      Procedure Summary     Date: 11/21/22 Room / Location: [facility] OR [facility]    Anesthesia Start: 3198 Anesthesia Stop: 1231    Procedure: CYSTOSCOPY WITH LEFT DOUBLE J STENT REMOVAL AND LEFT RETROGRADE PYELOGRAM. (Left: Ureter) Diagnosis:       Left ureteral injury, initial encounter      (LEFT URETERAL INJURY)    Surgeons: Carol Sheffield MD Responsible Provider: Madai Dumont DO    Anesthesia Type: general ASA Status: 2          Anesthesia Type: No value filed.     Minal Phase I:      Minal Phase II:        Anesthesia Post Evaluation    Patient location during evaluation: bedside  Patient participation: complete - patient participated  Level of consciousness: awake  Airway patency: patent  Nausea & Vomiting: no nausea and no vomiting  Complications: no  Cardiovascular status: blood pressure returned to baseline  Respiratory status: acceptable  Hydration status: euvolemic

## 2022-11-22 ENCOUNTER — TELEPHONE (OUTPATIENT)
Dept: UROLOGY | Age: 57
End: 2022-11-22

## 2022-11-22 DIAGNOSIS — S37.10XD LEFT URETERAL INJURY, SUBSEQUENT ENCOUNTER: Primary | ICD-10-CM

## 2022-11-29 ENCOUNTER — OFFICE VISIT (OUTPATIENT)
Dept: SURGERY | Age: 57
End: 2022-11-29
Payer: COMMERCIAL

## 2022-11-29 VITALS
HEIGHT: 65 IN | HEART RATE: 83 BPM | BODY MASS INDEX: 23.66 KG/M2 | TEMPERATURE: 97.7 F | OXYGEN SATURATION: 95 % | WEIGHT: 142 LBS

## 2022-11-29 DIAGNOSIS — K56.699 DIVERTICULAR STRICTURE (HCC): Primary | ICD-10-CM

## 2022-11-29 PROCEDURE — 99213 OFFICE O/P EST LOW 20 MIN: CPT | Performed by: COLON & RECTAL SURGERY

## 2022-11-29 ASSESSMENT — ENCOUNTER SYMPTOMS
ABDOMINAL PAIN: 0
CHEST TIGHTNESS: 0
SHORTNESS OF BREATH: 0
COLOR CHANGE: 0
DIARRHEA: 0
CONSTIPATION: 0

## 2022-11-29 NOTE — PROGRESS NOTES
Subjective:      Patient ID: Ryley Portillo is a 61 y.o. female who presents for:  Chief Complaint   Patient presents with    Follow-up       She returns to the office following a Foreman's procedure for a severe diverticular stricture. Earlier she has had her ureteral stent removed. She is feeling much better and having no problems. Her colostomy is working well. She is eating well.       Past Medical History:   Diagnosis Date    Diverticulitis     History of blood transfusion     Aug 25th, 2000 s/p birth of son    PONV (postoperative nausea and vomiting)     Retention of urine      Past Surgical History:   Procedure Laterality Date    BLADDER SURGERY Left 10/10/2022    CYSTOSCOPY WITH LEFT RETROGRADE PYELOGRAM AND POSSIBLE LEFT DOUBLE J STENT REMOVAL AND REPLACEMENT performed by Carol Sheffield MD at [place] [address] Left 2022    CYSTOSCOPY WITH LEFT DOUBLE J STENT REMOVAL AND LEFT RETROGRADE PYELOGRAM. performed by Carol Sheffield MD at [address] [place] [address] N/A 2022    SIGMOIDECTOMY WITH ABDOMINAL HYSTERECTOMY WITH BSO, END COLOSTOMY, ENTERECTOMY, CYSTOSCOPY WITH URETERAL STENT PLACEMENT performed by Birgit Manuel MD at [address] [place] [address], DIAGNOSTIC      HYSTERECTOMY (CERVIX STATUS UNKNOWN)      SKIN BIOPSY      on back unsure date    TONSILLECTOMY      at age 23     Social History     Socioeconomic History    Marital status:      Spouse name: Not on file    Number of children: Not on file    Years of education: Not on file    Highest education level: Not on file   Occupational History    Not on file   Tobacco Use    Smoking status: Former     Packs/day: 1.50     Years: 42.00     Pack years: 63.00     Types: Cigarettes     Start date: 10/15/1980     Quit date: 2022     Years since quittin.3    Smokeless tobacco: Never   Vaping Use    Vaping Use: Never used   Substance and Sexual Activity    Alcohol use: Not Currently Comment: rarely    Drug use: Never    Sexual activity: Not Currently   Other Topics Concern    Not on file   Social History Narrative    Not on file     Social Determinants of Health     Financial Resource Strain: Low Risk     Difficulty of Paying Living Expenses: Not hard at all   Food Insecurity: No Food Insecurity    Worried About Running Out of Food in the Last Year: Never true    Ran Out of Food in the Last Year: Never true   Transportation Needs: Not on file   Physical Activity: Not on file   Stress: Not on file   Social Connections: Not on file   Intimate Partner Violence: Not on file   Housing Stability: Not on file     Family History   Problem Relation Age of Onset    High Blood Pressure Mother     Cancer Father         lung cancer    Heart Disease Father     Diabetes Father     Diabetes Brother     Cancer Brother         lung cancer    High Blood Pressure Brother     High Blood Pressure Brother     No Known Problems Son     No Known Problems Daughter      Allergies:  Bactrim [sulfamethoxazole-trimethoprim], Ciprofloxacin, Codeine, Demerol hcl [meperidine], and Dilaudid [hydromorphone]    Review of Systems   Constitutional:  Negative for activity change, appetite change and unexpected weight change. HENT:  Negative for congestion. Respiratory:  Negative for chest tightness and shortness of breath. Cardiovascular:  Negative for chest pain. Gastrointestinal:  Negative for abdominal pain, constipation and diarrhea. Genitourinary:  Negative for difficulty urinating. Musculoskeletal:  Negative for arthralgias. Skin:  Negative for color change. Neurological:  Negative for dizziness and headaches. Hematological:  Does not bruise/bleed easily. Psychiatric/Behavioral:  Negative for agitation.       Objective:    Pulse 83   Temp 97.7 °F (36.5 °C) (Temporal)   Ht 5' 5\" (1.651 m)   Wt 142 lb (64.4 kg)   LMP  (LMP Unknown)   SpO2 95%   BMI 23.63 kg/m²     Physical Exam  Constitutional: Appearance: She is well-developed. HENT:      Head: Normocephalic and atraumatic. Eyes:      Pupils: Pupils are equal, round, and reactive to light. Cardiovascular:      Rate and Rhythm: Normal rate and regular rhythm. Heart sounds: Normal heart sounds. Pulmonary:      Effort: Pulmonary effort is normal. No respiratory distress. Breath sounds: Normal breath sounds. No wheezing. Abdominal:      General: There is no distension. Palpations: There is no mass. Tenderness: There is no abdominal tenderness. There is no guarding or rebound. Hernia: No hernia is present. Comments: Colostomy left lower quadrant looks fine. Working well. Abdomen soft and nondistended. Musculoskeletal:         General: Normal range of motion. Cervical back: Normal range of motion and neck supple. Skin:     General: Skin is warm and dry. Coloration: Skin is not pale. Findings: No erythema or rash. Neurological:      Mental Status: She is alert and oriented to person, place, and time. Psychiatric:         Behavior: Behavior normal.         Judgment: Judgment normal.            Assessment/Plan:          Diagnosis Orders   1. [facility]          I discussed the risks and benefits of colonoscopy for screening purposes and also evaluation of colostomy reversal.    Bowel prep instructions were given. Risks and benefits discussed. Consent obtained. Please note this report has beenpartially produced using speech recognition software and may cause contain errors related to that system including grammar, punctuation and spelling as well as words and phrases that may seem inappropriate.  If there arequestions or concerns please feel free to contact me to clarify

## 2023-01-05 ENCOUNTER — HOSPITAL ENCOUNTER (OUTPATIENT)
Dept: ULTRASOUND IMAGING | Age: 58
Discharge: HOME | End: 2023-01-07
Payer: COMMERCIAL

## 2023-01-05 DIAGNOSIS — S37.10XD LEFT URETERAL INJURY, SUBSEQUENT ENCOUNTER: ICD-10-CM

## 2023-01-05 PROCEDURE — 76770 US EXAM ABDO BACK WALL COMP: CPT

## 2023-01-09 ENCOUNTER — ANESTHESIA EVENT (OUTPATIENT)
Dept: OPERATING ROOM | Age: 58
End: 2023-01-09
Payer: COMMERCIAL

## 2023-01-09 ENCOUNTER — ANESTHESIA (OUTPATIENT)
Dept: OPERATING ROOM | Age: 58
End: 2023-01-09
Payer: COMMERCIAL

## 2023-01-09 ENCOUNTER — HOSPITAL ENCOUNTER (OUTPATIENT)
Age: 58
Setting detail: OUTPATIENT SURGERY
Discharge: HOME | End: 2023-01-09
Attending: COLON & RECTAL SURGERY | Admitting: COLON & RECTAL SURGERY
Payer: COMMERCIAL

## 2023-01-09 VITALS
RESPIRATION RATE: 16 BRPM | SYSTOLIC BLOOD PRESSURE: 107 MMHG | OXYGEN SATURATION: 100 % | HEART RATE: 72 BPM | BODY MASS INDEX: 23.66 KG/M2 | TEMPERATURE: 97.3 F | HEIGHT: 65 IN | WEIGHT: 142 LBS | DIASTOLIC BLOOD PRESSURE: 67 MMHG

## 2023-01-09 DIAGNOSIS — Z12.11 COLON CANCER SCREENING: ICD-10-CM

## 2023-01-09 PROCEDURE — 2500000003 HC RX 250 WO HCPCS: Performed by: ANESTHESIOLOGY

## 2023-01-09 PROCEDURE — 6360000002 HC RX W HCPCS

## 2023-01-09 PROCEDURE — 88305 TISSUE EXAM BY PATHOLOGIST: CPT

## 2023-01-09 PROCEDURE — 2580000003 HC RX 258: Performed by: COLON & RECTAL SURGERY

## 2023-01-09 PROCEDURE — 3700000000 HC ANESTHESIA ATTENDED CARE: Performed by: COLON & RECTAL SURGERY

## 2023-01-09 PROCEDURE — 3609027000 HC COLONOSCOPY: Performed by: COLON & RECTAL SURGERY

## 2023-01-09 PROCEDURE — 7100000010 HC PHASE II RECOVERY - FIRST 15 MIN: Performed by: COLON & RECTAL SURGERY

## 2023-01-09 PROCEDURE — 2580000003 HC RX 258: Performed by: ANESTHESIOLOGY

## 2023-01-09 PROCEDURE — 3700000001 HC ADD 15 MINUTES (ANESTHESIA): Performed by: COLON & RECTAL SURGERY

## 2023-01-09 PROCEDURE — 2709999900 HC NON-CHARGEABLE SUPPLY: Performed by: COLON & RECTAL SURGERY

## 2023-01-09 PROCEDURE — 7100000011 HC PHASE II RECOVERY - ADDTL 15 MIN: Performed by: COLON & RECTAL SURGERY

## 2023-01-09 PROCEDURE — 2500000003 HC RX 250 WO HCPCS

## 2023-01-09 RX ORDER — LABETALOL HYDROCHLORIDE 5 MG/ML
10 INJECTION, SOLUTION INTRAVENOUS
Status: DISCONTINUED | OUTPATIENT
Start: 2023-01-09 | End: 2023-01-09 | Stop reason: HOSPADM

## 2023-01-09 RX ORDER — DEXTROSE MONOHYDRATE 100 MG/ML
INJECTION, SOLUTION INTRAVENOUS CONTINUOUS PRN
Status: DISCONTINUED | OUTPATIENT
Start: 2023-01-09 | End: 2023-01-09 | Stop reason: HOSPADM

## 2023-01-09 RX ORDER — PROPOFOL 10 MG/ML
INJECTION, EMULSION INTRAVENOUS CONTINUOUS PRN
Status: DISCONTINUED | OUTPATIENT
Start: 2023-01-09 | End: 2023-01-09

## 2023-01-09 RX ORDER — LIDOCAINE HYDROCHLORIDE 10 MG/ML
1 INJECTION, SOLUTION EPIDURAL; INFILTRATION; INTRACAUDAL; PERINEURAL ONCE
Status: COMPLETED | OUTPATIENT
Start: 2023-01-09 | End: 2023-01-09

## 2023-01-09 RX ORDER — PROPOFOL 10 MG/ML
INJECTION, EMULSION INTRAVENOUS PRN
Status: DISCONTINUED | OUTPATIENT
Start: 2023-01-09 | End: 2023-01-09 | Stop reason: SDUPTHER

## 2023-01-09 RX ORDER — MAGNESIUM HYDROXIDE 1200 MG/15ML
LIQUID ORAL PRN
Status: DISCONTINUED | OUTPATIENT
Start: 2023-01-09 | End: 2023-01-09 | Stop reason: ALTCHOICE

## 2023-01-09 RX ORDER — SODIUM CHLORIDE 9 MG/ML
25 INJECTION, SOLUTION INTRAVENOUS PRN
Status: DISCONTINUED | OUTPATIENT
Start: 2023-01-09 | End: 2023-01-09 | Stop reason: HOSPADM

## 2023-01-09 RX ORDER — SODIUM CHLORIDE 0.9 % (FLUSH) 0.9 %
5-40 SYRINGE (ML) INJECTION EVERY 12 HOURS SCHEDULED
Status: DISCONTINUED | OUTPATIENT
Start: 2023-01-09 | End: 2023-01-09 | Stop reason: HOSPADM

## 2023-01-09 RX ORDER — SODIUM CHLORIDE 0.9 % (FLUSH) 0.9 %
5-40 SYRINGE (ML) INJECTION PRN
Status: DISCONTINUED | OUTPATIENT
Start: 2023-01-09 | End: 2023-01-09 | Stop reason: HOSPADM

## 2023-01-09 RX ORDER — METOCLOPRAMIDE HYDROCHLORIDE 5 MG/ML
10 INJECTION INTRAMUSCULAR; INTRAVENOUS
Status: DISCONTINUED | OUTPATIENT
Start: 2023-01-09 | End: 2023-01-09 | Stop reason: HOSPADM

## 2023-01-09 RX ORDER — LIDOCAINE HYDROCHLORIDE 20 MG/ML
INJECTION, SOLUTION EPIDURAL; INFILTRATION; INTRACAUDAL; PERINEURAL PRN
Status: DISCONTINUED | OUTPATIENT
Start: 2023-01-09 | End: 2023-01-09 | Stop reason: SDUPTHER

## 2023-01-09 RX ORDER — IPRATROPIUM BROMIDE AND ALBUTEROL SULFATE 2.5; .5 MG/3ML; MG/3ML
1 SOLUTION RESPIRATORY (INHALATION)
Status: DISCONTINUED | OUTPATIENT
Start: 2023-01-09 | End: 2023-01-09 | Stop reason: HOSPADM

## 2023-01-09 RX ORDER — HYDRALAZINE HYDROCHLORIDE 20 MG/ML
10 INJECTION INTRAMUSCULAR; INTRAVENOUS
Status: DISCONTINUED | OUTPATIENT
Start: 2023-01-09 | End: 2023-01-09 | Stop reason: HOSPADM

## 2023-01-09 RX ORDER — ONDANSETRON 2 MG/ML
4 INJECTION INTRAMUSCULAR; INTRAVENOUS
Status: DISCONTINUED | OUTPATIENT
Start: 2023-01-09 | End: 2023-01-09 | Stop reason: HOSPADM

## 2023-01-09 RX ORDER — SODIUM CHLORIDE, SODIUM LACTATE, POTASSIUM CHLORIDE, CALCIUM CHLORIDE 600; 310; 30; 20 MG/100ML; MG/100ML; MG/100ML; MG/100ML
INJECTION, SOLUTION INTRAVENOUS CONTINUOUS
Status: DISCONTINUED | OUTPATIENT
Start: 2023-01-09 | End: 2023-01-09 | Stop reason: HOSPADM

## 2023-01-09 RX ORDER — GLUCAGON 1 MG/ML
1 KIT INJECTION PRN
Status: DISCONTINUED | OUTPATIENT
Start: 2023-01-09 | End: 2023-01-09 | Stop reason: HOSPADM

## 2023-01-09 RX ADMIN — LIDOCAINE HYDROCHLORIDE 1 ML: 10 INJECTION, SOLUTION EPIDURAL; INFILTRATION; INTRACAUDAL; PERINEURAL at 06:49

## 2023-01-09 RX ADMIN — Medication 0.1 MG: at 07:59

## 2023-01-09 RX ADMIN — PROPOFOL 310 MG: 10 INJECTION, EMULSION INTRAVENOUS at 07:34

## 2023-01-09 RX ADMIN — SODIUM CHLORIDE, POTASSIUM CHLORIDE, SODIUM LACTATE AND CALCIUM CHLORIDE: 600; 310; 30; 20 INJECTION, SOLUTION INTRAVENOUS at 06:48

## 2023-01-09 RX ADMIN — Medication 0.1 MG: at 07:38

## 2023-01-09 RX ADMIN — Medication 0.1 MG: at 07:41

## 2023-01-09 RX ADMIN — LIDOCAINE HYDROCHLORIDE 40 MG: 20 INJECTION, SOLUTION EPIDURAL; INFILTRATION; INTRACAUDAL; PERINEURAL at 07:34

## 2023-01-09 RX ADMIN — Medication 0.1 MG: at 07:46

## 2023-01-09 NOTE — ANESTHESIA PRE PROCEDURE
Department of Anesthesiology  Preprocedure Note       Name:  Yvonne Ochoa   Age:  61 y.o.  :  1965                                          MRN:  [id number]         Date:  2023      Surgeon: Patricia March):  Kane Mosher MD    Procedure: Procedure(s):  COLONOSCOPY  (PAT ON ADMIT)    Medications prior to admission:   Prior to Admission medications    Medication Sig Start Date End Date Taking? Authorizing Provider   Phenazopyridine HCl (AZO TABS PO) Take by mouth as needed  Patient not taking: No sig reported    Historical Provider, MD   Meth-Hyo-M Bl-Na Phos-Ph Sal (USTELL) 120 MG CAPS  10/10/22   Historical Provider, MD   ketorolac (TORADOL) 10 MG tablet  10/10/22   Historical Provider, MD   ketorolac (TORADOL) 10 MG tablet Take 1 tablet by mouth every 6 hours as needed for Pain  Patient not taking: No sig reported 22  MD Luis Ospina Stann Poles Phos-Ph Sal (USTELL) 120 MG CAPS Take 1 tablet by mouth 4 times daily as needed (prn bladder spasms)  Patient not taking: No sig reported 22   Neris Daugherty MD   acetaminophen (TYLENOL) 500 MG tablet Take 500 mg by mouth every 6 hours as needed for Pain    Historical Provider, MD   omeprazole (PRILOSEC) 10 MG delayed release capsule Take 10 mg by mouth daily    Historical Provider, MD   ondansetron (ZOFRAN) 4 MG tablet Take 1 tablet by mouth every 8 hours as needed for Nausea or Vomiting  Patient not taking: No sig reported 9/15/22   Neris Daugherty MD       Current medications:    Current Facility-Administered Medications   Medication Dose Route Frequency Provider Last Rate Last Admin    ceFAZolin (ANCEF) 2000 mg in dextrose 5 % 100 mL IVPB  2,000 mg IntraVENous On Call to Fidelia Jean MD        lactated ringers infusion   IntraVENous Continuous Brightcash Castro MD        lidocaine PF 1 % injection 1 mL  1 mL IntraDERmal Once Brightcash Castro MD           Allergies:     Allergies   Allergen Reactions    Bactrim [Sulfamethoxazole-Trimethoprim] Nausea And Vomiting    Ciprofloxacin Nausea And Vomiting    Codeine Nausea And Vomiting    Demerol Hcl [Meperidine] Nausea Only    Dilaudid [Hydromorphone] Nausea Only       Problem List:    Patient Active Problem List   Diagnosis Code    Intestinal obstruction ([facility] [address] K56.609    Diverticular disease K57.90    Ureteral transection of left native kidney, initial encounter S37.10XA    Left ureteral injury S37.10XA       Past Medical History:        Diagnosis Date    Diverticulitis     History of blood transfusion     Aug 25th, 2000 s/p birth of son    PONV (postoperative nausea and vomiting)     Retention of urine        Past Surgical History:        Procedure Laterality Date    BLADDER SURGERY Left 10/10/2022    CYSTOSCOPY WITH LEFT RETROGRADE PYELOGRAM AND POSSIBLE LEFT DOUBLE J STENT REMOVAL AND REPLACEMENT performed by Leo Ruggiero MD at [address] [facility] Left 2022    CYSTOSCOPY WITH LEFT DOUBLE J STENT REMOVAL AND LEFT RETROGRADE PYELOGRAM. performed by Leo Ruggiero MD at [address] [facility] N/A 2022    SIGMOIDECTOMY WITH ABDOMINAL HYSTERECTOMY WITH BSO, END COLOSTOMY, ENTERECTOMY, CYSTOSCOPY WITH URETERAL STENT PLACEMENT performed by Padmini Arroyo MD at [address] [place] [address], DIAGNOSTIC      HYSTERECTOMY (CERVIX STATUS UNKNOWN)      SKIN BIOPSY      on back unsure date    TONSILLECTOMY      at age 23       Social History:    Social History     Tobacco Use    Smoking status: Former     Packs/day: 1.50     Years: 42.00     Pack years: 63.00     Types: Cigarettes     Start date: 10/15/1980     Quit date: 2022     Years since quittin.4    Smokeless tobacco: Never   Substance Use Topics    Alcohol use: Not Currently     Comment: rarely                                Counseling given: Not Answered      Vital Signs (Current):   Vitals:    23 0630   BP: 104/65   Pulse: 82 Resp: 18   Temp: 98 °F (36.7 °C)   TempSrc: Temporal   SpO2: 98%   Weight: 142 lb (64.4 kg)   Height: 5' 5\" (1.651 m)                                              BP Readings from Last 3 Encounters:   01/09/23 104/65   11/21/22 112/68   11/17/22 118/74       NPO Status: Time of last liquid consumption: 2100                        Time of last solid consumption: 1800                        Date of last liquid consumption: 01/08/23                        Date of last solid food consumption: 01/08/23    BMI:   Wt Readings from Last 3 Encounters:   01/09/23 142 lb (64.4 kg)   11/29/22 142 lb (64.4 kg)   11/21/22 142 lb (64.4 kg)     Body mass index is 23.63 kg/m². CBC:   Lab Results   Component Value Date/Time    WBC 6.9 11/17/2022 11:18 AM    RBC 4.27 11/17/2022 11:18 AM    HGB 11.7 11/17/2022 11:18 AM    HCT 36.8 11/17/2022 11:18 AM    MCV 86.2 11/17/2022 11:18 AM    RDW 17.4 11/17/2022 11:18 AM     11/17/2022 11:18 AM       CMP:   Lab Results   Component Value Date/Time     10/06/2022 03:18 PM    K 4.4 10/06/2022 03:18 PM    K 3.6 08/27/2022 04:22 AM     10/06/2022 03:18 PM    CO2 30 10/06/2022 03:18 PM    BUN 16 10/06/2022 03:18 PM    CREATININE 0.57 10/06/2022 03:18 PM    GFRAA >60.0 10/06/2022 03:18 PM    LABGLOM >60.0 10/06/2022 03:18 PM    GLUCOSE 100 10/06/2022 03:18 PM    PROT 7.0 09/23/2022 10:30 AM    CALCIUM 9.7 10/06/2022 03:18 PM    BILITOT 0.3 09/23/2022 10:30 AM    ALKPHOS 236 09/23/2022 10:30 AM    AST 11 09/23/2022 10:30 AM    ALT 17 09/23/2022 10:30 AM       POC Tests: No results for input(s): POCGLU, POCNA, POCK, POCCL, POCBUN, POCHEMO, POCHCT in the last 72 hours.     Coags:   Lab Results   Component Value Date/Time    PROTIME 13.4 10/06/2022 03:15 PM    INR 1.0 10/06/2022 03:15 PM    APTT 32.4 10/06/2022 03:15 PM       HCG (If Applicable):   Lab Results   Component Value Date    PREGTESTUR Negative 11/09/2016        ABGs: No results found for: PHART, PO2ART, EYY5ONA, ZXC3RTE, BEART, I0PKRCSQ     Type & Screen (If Applicable):  No results found for: LABABO, LABRH    Drug/Infectious Status (If Applicable):  No results found for: HIV, HEPCAB    COVID-19 Screening (If Applicable): No results found for: COVID19        Anesthesia Evaluation  Patient summary reviewed and Nursing notes reviewed   history of anesthetic complications: PONV. Airway: Mallampati: II  TM distance: >3 FB   Neck ROM: full  Mouth opening: > = 3 FB   Dental: normal exam         Pulmonary:Negative Pulmonary ROS and normal exam                               Cardiovascular:Negative CV ROS  Exercise tolerance: good (>4 METS),         ECG reviewed               Beta Blocker:  Not on Beta Blocker         Neuro/Psych:   Negative Neuro/Psych ROS              GI/Hepatic/Renal: Neg GI/Hepatic/Renal ROS            Endo/Other: Negative Endo/Other ROS             Pt had PAT visit. Abdominal:             Vascular: negative vascular ROS. Other Findings:           Anesthesia Plan      MAC     ASA 2       Induction: intravenous. MIPS: Prophylactic antiemetics administered. Anesthetic plan and risks discussed with patient. Plan discussed with CRNA.     Attending anesthesiologist reviewed and agrees with Pre Eval content                Yun Hahn MD   1/9/2023

## 2023-01-09 NOTE — ANESTHESIA POSTPROCEDURE EVALUATION
Department of Anesthesiology  Postprocedure Note    Patient: Davis Moore  MRN: [id number]  YOB: 1965  Date of evaluation: 1/9/2023      Procedure Summary     Date: 01/09/23 Room / Location: [address]    Anesthesia Start: 0730 Anesthesia Stop: 8604    Procedure: COLONOSCOPY Diagnosis:       Colon cancer screening      (COLON CANCER SCREENING)    Surgeons: Nikole Jensen MD Responsible Provider: Petra Wise MD    Anesthesia Type: MAC ASA Status: 2          Anesthesia Type: No value filed.     Minal Phase I:      Minal Phase II:        Anesthesia Post Evaluation    Patient location during evaluation: bedside  Patient participation: complete - patient participated  Level of consciousness: awake and alert  Airway patency: patent  Nausea & Vomiting: no nausea and no vomiting  Complications: no  Cardiovascular status: hemodynamically stable  Respiratory status: acceptable  Hydration status: euvolemic

## 2023-01-09 NOTE — H&P
Subjective:      Patient ID: Gisele Haney is a 61 y.o. female who presents for:      Chief Complaint   Patient presents with    Follow-up         She returns to the office following a Foreman's procedure for a severe diverticular stricture. Earlier she has had her ureteral stent removed. She is feeling much better and having no problems. Her colostomy is working well. She is eating well. Past medical history reviewed and unchanged from this visit.         Past Medical History        Past Medical History:   Diagnosis Date    Diverticulitis      History of blood transfusion       Aug 25th, Jan 2000 s/p birth of son    PONV (postoperative nausea and vomiting)      Retention of urine           Past Surgical History         Past Surgical History:   Procedure Laterality Date    BLADDER SURGERY Left 10/10/2022     CYSTOSCOPY WITH LEFT RETROGRADE PYELOGRAM AND POSSIBLE LEFT DOUBLE J STENT REMOVAL AND REPLACEMENT performed by Billie Lucero MD at [place] [address] Left 11/21/2022     CYSTOSCOPY WITH LEFT DOUBLE J STENT REMOVAL AND LEFT RETROGRADE PYELOGRAM. performed by Billie Lucero MD at [address] [place] [address] N/A 08/25/2022     SIGMOIDECTOMY WITH ABDOMINAL HYSTERECTOMY WITH BSO, END COLOSTOMY, ENTERECTOMY, CYSTOSCOPY WITH URETERAL STENT PLACEMENT performed by Kieran Balderas MD at [place] [address], COLON, DIAGNOSTIC        HYSTERECTOMY (CERVIX STATUS UNKNOWN)        SKIN BIOPSY         on back unsure date    TONSILLECTOMY         at age 23         Social History               Socioeconomic History    Marital status:        Spouse name: Not on file    Number of children: Not on file    Years of education: Not on file    Highest education level: Not on file   Occupational History    Not on file   Tobacco Use    Smoking status: Former       Packs/day: 1.50       Years: 42.00       Pack years: 63.00       Types: Cigarettes       Start date: 10/15/1980 Quit date: 2022       Years since quittin.3    Smokeless tobacco: Never   Vaping Use    Vaping Use: Never used   Substance and Sexual Activity    Alcohol use: Not Currently       Comment: rarely    Drug use: Never    Sexual activity: Not Currently   Other Topics Concern    Not on file   Social History Narrative    Not on file      Social Determinants of Health          Financial Resource Strain: Low Risk     Difficulty of Paying Living Expenses: Not hard at all   Food Insecurity: No Food Insecurity    Worried About Running Out of Food in the Last Year: Never true    Ran Out of Food in the Last Year: Never true   Transportation Needs: Not on file   Physical Activity: Not on file   Stress: Not on file   Social Connections: Not on file   Intimate Partner Violence: Not on file   Housing Stability: Not on file         Family History         Family History   Problem Relation Age of Onset    High Blood Pressure Mother      Cancer Father           lung cancer    Heart Disease Father      Diabetes Father      Diabetes Brother      Cancer Brother           lung cancer    High Blood Pressure Brother      High Blood Pressure Brother      No Known Problems Son      No Known Problems Daughter           Allergies:  Bactrim [sulfamethoxazole-trimethoprim], Ciprofloxacin, Codeine, Demerol hcl [meperidine], and Dilaudid [hydromorphone]     Review of Systems   Constitutional:  Negative for activity change, appetite change and unexpected weight change. HENT:  Negative for congestion. Respiratory:  Negative for chest tightness and shortness of breath. Cardiovascular:  Negative for chest pain. Gastrointestinal:  Negative for abdominal pain, constipation and diarrhea. Genitourinary:  Negative for difficulty urinating. Musculoskeletal:  Negative for arthralgias. Skin:  Negative for color change. Neurological:  Negative for dizziness and headaches. Hematological:  Does not bruise/bleed easily. Psychiatric/Behavioral:  Negative for agitation. Objective:    Pulse 83   Temp 97.7 °F (36.5 °C) (Temporal)   Ht 5' 5\" (1.651 m)   Wt 142 lb (64.4 kg)   LMP  (LMP Unknown)   SpO2 95%   BMI 23.63 kg/m²      Physical Exam  Constitutional:       Appearance: She is well-developed. HENT:      Head: Normocephalic and atraumatic. Eyes:      Pupils: Pupils are equal, round, and reactive to light. Cardiovascular:      Rate and Rhythm: Normal rate and regular rhythm. Heart sounds: Normal heart sounds. Pulmonary:      Effort: Pulmonary effort is normal. No respiratory distress. Breath sounds: Normal breath sounds. No wheezing. Abdominal:      General: There is no distension. Palpations: There is no mass. Tenderness: There is no abdominal tenderness. There is no guarding or rebound. Hernia: No hernia is present. Comments: Colostomy left lower quadrant looks fine. Working well. Abdomen soft and nondistended. Musculoskeletal:         General: Normal range of motion. Cervical back: Normal range of motion and neck supple. Skin:     General: Skin is warm and dry. Coloration: Skin is not pale. Findings: No erythema or rash. Neurological:      Mental Status: She is alert and oriented to person, place, and time. Psychiatric:         Behavior: Behavior normal.         Judgment: Judgment normal.      Physical exam reviewed and unchanged from this visit. Assessment/Plan:           Diagnosis Orders   1. Diverticular stricture ([facility] [address]             I discussed the risks and benefits of colonoscopy for screening purposes and also evaluation of colostomy reversal.    Bowel prep instructions were given. Risks and benefits discussed. Consent obtained. Discussed again the benefits and the risks of colonoscopy. Bowel prep went well. All questions answered. Consent again obtained.                  Please note this report has beenpartially produced using speech recognition software and may cause contain errors related to that system including grammar, punctuation and spelling as well as words and phrases that may seem inappropriate.  If there arequestions or concerns please feel free to contact me to clarify

## 2023-01-09 NOTE — DISCHARGE INSTRUCTIONS
Normal post colonoscopy instructions    No driving today    Office in the next 2 weeks to discuss colostomy reversal

## 2023-01-13 ENCOUNTER — OFFICE VISIT (OUTPATIENT)
Dept: UROLOGY | Age: 58
End: 2023-01-13
Payer: COMMERCIAL

## 2023-01-13 VITALS
BODY MASS INDEX: 23.99 KG/M2 | SYSTOLIC BLOOD PRESSURE: 110 MMHG | WEIGHT: 144 LBS | HEART RATE: 70 BPM | HEIGHT: 65 IN | DIASTOLIC BLOOD PRESSURE: 80 MMHG

## 2023-01-13 DIAGNOSIS — S37.10XD LEFT URETERAL INJURY, SUBSEQUENT ENCOUNTER: Primary | ICD-10-CM

## 2023-01-13 PROCEDURE — 99214 OFFICE O/P EST MOD 30 MIN: CPT | Performed by: UROLOGY

## 2023-01-13 ASSESSMENT — ENCOUNTER SYMPTOMS: ABDOMINAL PAIN: 0

## 2023-01-13 NOTE — PROGRESS NOTES
Subjective:      Patient ID: Saul Katz is a 61 y.o. female    HPI  This is a 62 y/o s/p Rectosigmoid resection and Lt ureteral injury repair with cystoscopy and stent on 8/25/22 and then had attempted stent removal on 10/10/22 but was found to have a small area of contrast extravasation noted on retrograde and a 4.7 Fr x 26 cm stent was replaced for more comfort as she was very bothered by the prior stent. She then had the stent removed after Lt retrograde showed no extravasation or stricturing on 11/21/22. Since then, she has no further IVS, no flank pain or hematuria. She feels back to normal. I reviewed the interval renal U/S report.        Past Medical History:   Diagnosis Date    Diverticulitis     History of blood transfusion     Aug 25th, Jan 2000 s/p birth of son    PONV (postoperative nausea and vomiting)     Retention of urine      Past Surgical History:   Procedure Laterality Date    BLADDER SURGERY Left 10/10/2022    CYSTOSCOPY WITH LEFT RETROGRADE PYELOGRAM AND POSSIBLE LEFT DOUBLE J STENT REMOVAL AND REPLACEMENT performed by Vinay Leon MD at [place] [address] Left 11/21/2022    CYSTOSCOPY WITH LEFT DOUBLE J STENT REMOVAL AND LEFT RETROGRADE PYELOGRAM. performed by Vinay Leon MD at [address] [facility] N/A 08/25/2022    SIGMOIDECTOMY WITH ABDOMINAL HYSTERECTOMY WITH BSO, END COLOSTOMY, ENTERECTOMY, CYSTOSCOPY WITH URETERAL STENT PLACEMENT performed by Darci Kelley MD at [address] [facility] N/A 1/9/2023    COLONOSCOPY performed by Darci Kelley MD at [address] [place] [address], COLON, DIAGNOSTIC      HYSTERECTOMY (CERVIX STATUS UNKNOWN)      SKIN BIOPSY      on back unsure date    TONSILLECTOMY      at age 23     Social History     Socioeconomic History    Marital status:      Spouse name: None    Number of children: None    Years of education: None    Highest education level: None   Tobacco Use    Smoking status: Former     Packs/day: 1.50     Years: 42.00     Pack years: 63.00     Types: Cigarettes     Start date: 10/15/1980     Quit date: 2022     Years since quittin.4    Smokeless tobacco: Never   Vaping Use    Vaping Use: Never used   Substance and Sexual Activity    Alcohol use: Not Currently     Comment: rarely    Drug use: Never    Sexual activity: Not Currently     Social Determinants of Health     Financial Resource Strain: Low Risk     Difficulty of Paying Living Expenses: Not hard at all   Food Insecurity: No Food Insecurity    Worried About Running Out of Food in the Last Year: Never true    Ran Out of Food in the Last Year: Never true     Family History   Problem Relation Age of Onset    High Blood Pressure Mother     Cancer Father         lung cancer    Heart Disease Father     Diabetes Father     Diabetes Brother     Cancer Brother         lung cancer    High Blood Pressure Brother     High Blood Pressure Brother     No Known Problems Son     No Known Problems Daughter      Current Outpatient Medications   Medication Sig Dispense Refill    acetaminophen (TYLENOL) 500 MG tablet Take 500 mg by mouth every 6 hours as needed for Pain      omeprazole (PRILOSEC) 10 MG delayed release capsule Take 10 mg by mouth daily       No current facility-administered medications for this visit. Bactrim [sulfamethoxazole-trimethoprim], Ciprofloxacin, Codeine, Demerol hcl [meperidine], and Dilaudid [hydromorphone]  reviewed      Review of Systems   Constitutional:  Negative for fever. Gastrointestinal:  Negative for abdominal pain. Genitourinary:  Negative for difficulty urinating, dysuria, flank pain and hematuria. Objective:   Physical Exam  Constitutional:       Appearance: Normal appearance. Abdominal:      General: There is no distension. Neurological:      Mental Status: She is alert.    Psychiatric:         Mood and Affect: Mood normal.          US RETROPERITONEAL COMPLETE [[id number]]  Status: Final result     Order Providers    Authorizing Encounter MD Jose G Jaffe 3 2 Nathen Manzano MD            Signed by    Signed Date/Time Phone Pager   Ceferino Hooks 1/07/2023  7:32 [phone]      Reading Providers    Read Date Phone Pager   Lilly Weiss Jan 7, 2023  7:32 [phone]        All Reviewers List    Go Santillan MD on 1/8/2023 08:44             US RETROPERITONEAL COMPLETE: Patient Communication     Add Comments   Seen         Routing History    Priority Sent On From To Message Type    1/7/2023  7:34 AM Jordan, Chpo Incoming Radiant Results From Sherwin Payne MD Results               Radiation Dose Estimates    No radiation information found for this patient            Narrative   EXAMINATION:   RETROPERITONEAL ULTRASOUND OF THE KIDNEYS AND URINARY BLADDER       1/5/2023       COMPARISON:   None       HISTORY:   ORDERING SYSTEM PROVIDED HISTORY: Left ureteral injury, subsequent encounter   TECHNOLOGIST PROVIDED HISTORY:   This procedure can be scheduled via irisnote. Access your irisnote account by   visiting [employer]. Reason for exam:->Post Op Renal US   What reading provider will be dictating this exam?->CRC       FINDINGS:       Kidneys: The right kidney measures 11.3 cm in length and the left kidney measures 11.1   cm in length. Tiny cyst in the right kidney measuring 9 mm. No obstruction   identified of either kidney. No cortical regularity. No stones or solid   mass. Bladder:       Unremarkable appearance of the bladder. No the underlying mass or lesion. No evidence of postvoid residual.  Bilateral ureteral jets are present. Prevoid volume is 304 mL with postvoid volume 14 mL. No wall thickening. Impression   Tiny cyst in the right kidney otherwise unremarkable ultrasound of the   kidneys and urinary bladder. Assessment:       This is a 60 y/o s/p Rectosigmoid resection and Lt ureteral injury repair with cystoscopy and stent on 8/25/22 and then stent removal after healing confirmed on retrograde and she has no current symptoms and U/S shows no hydronephrosis. I reassured her of these findings and discussed symptoms of concern in the future that she should call us for. All questions were answered.        Plan:      F/U prn or if gets hematuria or flank pain        Piedad Olivas MD

## 2023-02-08 PROBLEM — Z12.11 COLON CANCER SCREENING: Status: RESOLVED | Noted: 2023-01-09 | Resolved: 2023-02-08

## 2023-03-07 ENCOUNTER — OFFICE VISIT (OUTPATIENT)
Dept: SURGERY | Age: 58
End: 2023-03-07
Payer: COMMERCIAL

## 2023-03-07 VITALS
OXYGEN SATURATION: 98 % | BODY MASS INDEX: 23.99 KG/M2 | TEMPERATURE: 97.5 F | HEART RATE: 87 BPM | WEIGHT: 144 LBS | HEIGHT: 65 IN

## 2023-03-07 DIAGNOSIS — R10.30 LOWER ABDOMINAL PAIN: Primary | ICD-10-CM

## 2023-03-07 DIAGNOSIS — K43.2 INCISIONAL HERNIA, WITHOUT OBSTRUCTION OR GANGRENE: ICD-10-CM

## 2023-03-07 PROCEDURE — 99213 OFFICE O/P EST LOW 20 MIN: CPT | Performed by: COLON & RECTAL SURGERY

## 2023-03-07 ASSESSMENT — ENCOUNTER SYMPTOMS
SHORTNESS OF BREATH: 0
CONSTIPATION: 0
COLOR CHANGE: 0
CHEST TIGHTNESS: 0
DIARRHEA: 0
ABDOMINAL PAIN: 1
VOMITING: 0

## 2023-03-07 NOTE — PROGRESS NOTES
Subjective:      Patient ID: Kranthi Roberto is a 61 y.o. female who presents for:  Chief Complaint   Patient presents with    Follow-up       She returns to the office with concern regarding hernia from laparotomy incision. She complains of a bulge. She denies nausea or vomiting. Colostomy working well. The remainder of her past medical and surgical history was reviewed and unchanged.       Past Medical History:   Diagnosis Date    Diverticulitis     History of blood transfusion     Aug 25th, 2000 s/p birth of son    PONV (postoperative nausea and vomiting)     Retention of urine      Past Surgical History:   Procedure Laterality Date    BLADDER SURGERY Left 10/10/2022    CYSTOSCOPY WITH LEFT RETROGRADE PYELOGRAM AND POSSIBLE LEFT DOUBLE J STENT REMOVAL AND REPLACEMENT performed by Willa Matos MD at [place] [address] Left 2022    CYSTOSCOPY WITH LEFT DOUBLE J STENT REMOVAL AND LEFT RETROGRADE PYELOGRAM. performed by Willa Matos MD at [address] [facility] N/A 2022    SIGMOIDECTOMY WITH ABDOMINAL HYSTERECTOMY WITH BSO, END COLOSTOMY, ENTERECTOMY, CYSTOSCOPY WITH URETERAL STENT PLACEMENT performed by Carlene Vasquez MD at [facility] N/A 2023    COLONOSCOPY performed by Carlene Vasquez MD at [facility], COLON, DIAGNOSTIC      HYSTERECTOMY (CERVIX STATUS UNKNOWN)      SKIN BIOPSY      on back unsure date    TONSILLECTOMY      at age 23     Social History     Socioeconomic History    Marital status:      Spouse name: Not on file    Number of children: Not on file    Years of education: Not on file    Highest education level: Not on file   Occupational History    Not on file   Tobacco Use    Smoking status: Former     Packs/day: 1.50     Years: 42.00     Pack years: 63.00     Types: Cigarettes     Start date: 10/15/1980     Quit date: 2022     Years since quittin.5    Smokeless tobacco: Never   Vaping Use Vaping Use: Never used   Substance and Sexual Activity    Alcohol use: Not Currently     Comment: rarely    Drug use: Never    Sexual activity: Not Currently   Other Topics Concern    Not on file   Social History Narrative    Not on file     Social Determinants of Health     Financial Resource Strain: Low Risk     Difficulty of Paying Living Expenses: Not hard at all   Food Insecurity: No Food Insecurity    Worried About Running Out of Food in the Last Year: Never true    Ran Out of Food in the Last Year: Never true   Transportation Needs: Not on file   Physical Activity: Not on file   Stress: Not on file   Social Connections: Not on file   Intimate Partner Violence: Not on file   Housing Stability: Not on file     Family History   Problem Relation Age of Onset    High Blood Pressure Mother     Cancer Father         lung cancer    Heart Disease Father     Diabetes Father     Diabetes Brother     Cancer Brother         lung cancer    High Blood Pressure Brother     High Blood Pressure Brother     No Known Problems Son     No Known Problems Daughter      Allergies:  Bactrim [sulfamethoxazole-trimethoprim], Ciprofloxacin, Codeine, Demerol hcl [meperidine], and Dilaudid [hydromorphone]    Review of Systems   Constitutional:  Negative for activity change, appetite change and unexpected weight change.   HENT:  Negative for congestion.    Respiratory:  Negative for chest tightness and shortness of breath.    Cardiovascular:  Negative for chest pain and palpitations.   Gastrointestinal:  Positive for abdominal pain. Negative for constipation, diarrhea and vomiting.   Genitourinary:  Negative for difficulty urinating.   Musculoskeletal:  Negative for arthralgias.   Skin:  Negative for color change.   Neurological:  Negative for dizziness and headaches.   Hematological:  Does not bruise/bleed easily.   Psychiatric/Behavioral:  Negative for agitation.      Objective:    Pulse 87   Temp 97.5 °F (36.4 °C) (Temporal)   Ht 5' 5\"  (1.651 m)   Wt 144 lb (65.3 kg)   LMP  (LMP Unknown)   SpO2 98%   BMI 23.96 kg/m²     Physical Exam  Constitutional:       Appearance: She is well-developed. HENT:      Head: Normocephalic and atraumatic. Eyes:      Pupils: Pupils are equal, round, and reactive to light. Cardiovascular:      Rate and Rhythm: Normal rate and regular rhythm. Heart sounds: Normal heart sounds. Pulmonary:      Effort: Pulmonary effort is normal. No respiratory distress. Breath sounds: Normal breath sounds. No wheezing. Abdominal:      General: There is no distension. Palpations: There is no mass. Tenderness: There is no abdominal tenderness. There is no guarding or rebound. Hernia: A hernia is present. Comments: Reducible incisional hernia without skin changes. Colostomy looks fine. Difficult to assess hernia size on exam.   Musculoskeletal:         General: Normal range of motion. Cervical back: Normal range of motion and neck supple. Skin:     General: Skin is warm and dry. Coloration: Skin is not pale. Findings: No erythema or rash. Neurological:      Mental Status: She is alert and oriented to person, place, and time. Psychiatric:         Behavior: Behavior normal.         Judgment: Judgment normal.            Assessment/Plan:          Diagnosis Orders   1. Lower abdominal pain  CT ABDOMEN PELVIS W IV CONTRAST Additional Contrast? Radiologist Recommendation      2. Incisional hernia, without obstruction or gangrene          The difficult rounding hernia repair in the setting of a colostomy reversal wounds the contraindication for mesh placement. The incisional hernia can be repaired with mesh and then colostomy reversal can be done later.   I am unsure of whether a primary repair can be performed and will require a CT scan of the abdomen to evaluate the fascial disruption prior to making a decision regarding isolated incisional hernia repair versus concomitant colostomy reversal.    I will review that CAT scan to evaluate the abdominal wall prior to making decisions regarding further surgical intervention. Please note this report has beenpartially produced using speech recognition software and may cause contain errors related to that system including grammar, punctuation and spelling as well as words and phrases that may seem inappropriate.  If there arequestions or concerns please feel free to contact me to clarify

## 2023-03-16 ENCOUNTER — HOSPITAL ENCOUNTER (OUTPATIENT)
Dept: CT IMAGING | Age: 58
Discharge: HOME | End: 2023-03-18
Payer: COMMERCIAL

## 2023-03-16 DIAGNOSIS — R10.30 LOWER ABDOMINAL PAIN: ICD-10-CM

## 2023-03-16 PROCEDURE — 6360000004 HC RX CONTRAST MEDICATION: Performed by: COLON & RECTAL SURGERY

## 2023-03-16 PROCEDURE — 74177 CT ABD & PELVIS W/CONTRAST: CPT

## 2023-03-16 RX ADMIN — IOPAMIDOL 50 ML: 612 INJECTION, SOLUTION INTRAVENOUS at 10:53

## 2023-03-16 RX ADMIN — IOPAMIDOL 20 ML: 612 INJECTION, SOLUTION INTRAVENOUS at 10:55

## 2023-03-21 ENCOUNTER — OFFICE VISIT (OUTPATIENT)
Dept: SURGERY | Age: 58
End: 2023-03-21
Payer: COMMERCIAL

## 2023-03-21 VITALS
BODY MASS INDEX: 23.99 KG/M2 | OXYGEN SATURATION: 98 % | HEART RATE: 85 BPM | TEMPERATURE: 97.5 F | WEIGHT: 144 LBS | HEIGHT: 65 IN

## 2023-03-21 DIAGNOSIS — K43.2 INCISIONAL HERNIA, WITHOUT OBSTRUCTION OR GANGRENE: Primary | ICD-10-CM

## 2023-03-21 PROCEDURE — 99213 OFFICE O/P EST LOW 20 MIN: CPT | Performed by: COLON & RECTAL SURGERY

## 2023-03-21 RX ORDER — SODIUM CHLORIDE 0.9 % (FLUSH) 0.9 %
5-40 SYRINGE (ML) INJECTION EVERY 12 HOURS SCHEDULED
OUTPATIENT
Start: 2023-03-21

## 2023-03-21 RX ORDER — SODIUM CHLORIDE 0.9 % (FLUSH) 0.9 %
5-40 SYRINGE (ML) INJECTION PRN
OUTPATIENT
Start: 2023-03-21

## 2023-03-21 RX ORDER — SODIUM CHLORIDE 9 MG/ML
INJECTION, SOLUTION INTRAVENOUS PRN
OUTPATIENT
Start: 2023-03-21

## 2023-03-21 ASSESSMENT — ENCOUNTER SYMPTOMS
CHEST TIGHTNESS: 0
COLOR CHANGE: 0
SHORTNESS OF BREATH: 0
DIARRHEA: 0
ABDOMINAL PAIN: 1
VOMITING: 0
CONSTIPATION: 0

## 2023-03-21 NOTE — PROGRESS NOTES
change. Neurological:  Negative for dizziness and headaches. Hematological:  Does not bruise/bleed easily. Psychiatric/Behavioral:  Negative for agitation. Objective:    Pulse 85   Temp 97.5 °F (36.4 °C) (Temporal)   Ht 5' 5\" (1.651 m)   Wt 144 lb (65.3 kg)   LMP  (LMP Unknown)   SpO2 98%   BMI 23.96 kg/m²     Physical Exam  Constitutional:       Appearance: She is well-developed. HENT:      Head: Normocephalic and atraumatic. Eyes:      Pupils: Pupils are equal, round, and reactive to light. Cardiovascular:      Rate and Rhythm: Normal rate and regular rhythm. Heart sounds: Normal heart sounds. Pulmonary:      Effort: Pulmonary effort is normal. No respiratory distress. Breath sounds: Normal breath sounds. No wheezing. Abdominal:      General: There is no distension. Palpations: There is no mass. Tenderness: There is no abdominal tenderness. There is no guarding or rebound. Hernia: A hernia is present. Comments: Appreciated 8 x 10 cm reducible incisional hernia present on patient's relaxation. Colostomy looks fine and is functioning well. Musculoskeletal:         General: Normal range of motion. Cervical back: Normal range of motion and neck supple. Skin:     General: Skin is warm and dry. Coloration: Skin is not pale. Findings: No erythema or rash. Neurological:      Mental Status: She is alert and oriented to person, place, and time. Psychiatric:         Behavior: Behavior normal.         Judgment: Judgment normal.            Assessment/Plan:          Diagnosis Orders   1. Incisional hernia, without obstruction or gangrene          Patient is in agreement to proceed with incisional hernia repair with mesh. She has no urgency if ever to have colostomy reversed and she is doing so well. She wishes to have the hernia repaired. Risks of infection bleeding recurrence and postoperative pain discussed.     Nonoperative alternatives

## 2023-03-30 ENCOUNTER — HOSPITAL ENCOUNTER (OUTPATIENT)
Age: 58
Setting detail: OUTPATIENT SURGERY
Discharge: HOME | End: 2023-03-30
Attending: COLON & RECTAL SURGERY | Admitting: COLON & RECTAL SURGERY
Payer: COMMERCIAL

## 2023-03-30 ENCOUNTER — ANESTHESIA (OUTPATIENT)
Dept: OPERATING ROOM | Age: 58
End: 2023-03-30
Payer: COMMERCIAL

## 2023-03-30 ENCOUNTER — ANESTHESIA EVENT (OUTPATIENT)
Dept: OPERATING ROOM | Age: 58
End: 2023-03-30
Payer: COMMERCIAL

## 2023-03-30 VITALS
OXYGEN SATURATION: 94 % | WEIGHT: 150 LBS | TEMPERATURE: 97.3 F | SYSTOLIC BLOOD PRESSURE: 141 MMHG | RESPIRATION RATE: 16 BRPM | DIASTOLIC BLOOD PRESSURE: 76 MMHG | HEART RATE: 60 BPM | HEIGHT: 65 IN | BODY MASS INDEX: 24.99 KG/M2

## 2023-03-30 DIAGNOSIS — K43.2 INCISIONAL HERNIA WITHOUT OBSTRUCTION OR GANGRENE: ICD-10-CM

## 2023-03-30 PROCEDURE — 6370000000 HC RX 637 (ALT 250 FOR IP): Performed by: STUDENT IN AN ORGANIZED HEALTH CARE EDUCATION/TRAINING PROGRAM

## 2023-03-30 PROCEDURE — 2500000003 HC RX 250 WO HCPCS: Performed by: STUDENT IN AN ORGANIZED HEALTH CARE EDUCATION/TRAINING PROGRAM

## 2023-03-30 PROCEDURE — 6360000002 HC RX W HCPCS: Performed by: COLON & RECTAL SURGERY

## 2023-03-30 PROCEDURE — 3600000014 HC SURGERY LEVEL 4 ADDTL 15MIN: Performed by: COLON & RECTAL SURGERY

## 2023-03-30 PROCEDURE — 3700000001 HC ADD 15 MINUTES (ANESTHESIA): Performed by: COLON & RECTAL SURGERY

## 2023-03-30 PROCEDURE — 3700000000 HC ANESTHESIA ATTENDED CARE: Performed by: COLON & RECTAL SURGERY

## 2023-03-30 PROCEDURE — C1781 MESH (IMPLANTABLE): HCPCS | Performed by: COLON & RECTAL SURGERY

## 2023-03-30 PROCEDURE — 7100000010 HC PHASE II RECOVERY - FIRST 15 MIN: Performed by: COLON & RECTAL SURGERY

## 2023-03-30 PROCEDURE — A4217 STERILE WATER/SALINE, 500 ML: HCPCS | Performed by: COLON & RECTAL SURGERY

## 2023-03-30 PROCEDURE — 3600000004 HC SURGERY LEVEL 4 BASE: Performed by: COLON & RECTAL SURGERY

## 2023-03-30 PROCEDURE — 7100000001 HC PACU RECOVERY - ADDTL 15 MIN: Performed by: COLON & RECTAL SURGERY

## 2023-03-30 PROCEDURE — 7100000011 HC PHASE II RECOVERY - ADDTL 15 MIN: Performed by: COLON & RECTAL SURGERY

## 2023-03-30 PROCEDURE — 2580000003 HC RX 258: Performed by: COLON & RECTAL SURGERY

## 2023-03-30 PROCEDURE — 2580000003 HC RX 258: Performed by: STUDENT IN AN ORGANIZED HEALTH CARE EDUCATION/TRAINING PROGRAM

## 2023-03-30 PROCEDURE — 7100000000 HC PACU RECOVERY - FIRST 15 MIN: Performed by: COLON & RECTAL SURGERY

## 2023-03-30 PROCEDURE — 6370000000 HC RX 637 (ALT 250 FOR IP)

## 2023-03-30 PROCEDURE — 6360000002 HC RX W HCPCS: Performed by: STUDENT IN AN ORGANIZED HEALTH CARE EDUCATION/TRAINING PROGRAM

## 2023-03-30 PROCEDURE — 2709999900 HC NON-CHARGEABLE SUPPLY: Performed by: COLON & RECTAL SURGERY

## 2023-03-30 PROCEDURE — 64488 TAP BLOCK BI INJECTION: CPT | Performed by: STUDENT IN AN ORGANIZED HEALTH CARE EDUCATION/TRAINING PROGRAM

## 2023-03-30 DEVICE — PATCH HERN L W5.4XL7IN UNCOATED MFIL PROPYLENE OVL ABSRB: Type: IMPLANTABLE DEVICE | Site: ABDOMEN | Status: FUNCTIONAL

## 2023-03-30 RX ORDER — LABETALOL HYDROCHLORIDE 5 MG/ML
10 INJECTION, SOLUTION INTRAVENOUS
Status: DISCONTINUED | OUTPATIENT
Start: 2023-03-30 | End: 2023-03-30 | Stop reason: HOSPADM

## 2023-03-30 RX ORDER — SODIUM CHLORIDE 0.9 % (FLUSH) 0.9 %
5-40 SYRINGE (ML) INJECTION EVERY 12 HOURS SCHEDULED
Status: DISCONTINUED | OUTPATIENT
Start: 2023-03-30 | End: 2023-03-30 | Stop reason: HOSPADM

## 2023-03-30 RX ORDER — PROPOFOL 10 MG/ML
INJECTION, EMULSION INTRAVENOUS PRN
Status: DISCONTINUED | OUTPATIENT
Start: 2023-03-30 | End: 2023-03-30 | Stop reason: SDUPTHER

## 2023-03-30 RX ORDER — PROCHLORPERAZINE EDISYLATE 5 MG/ML
5 INJECTION INTRAMUSCULAR; INTRAVENOUS
Status: DISCONTINUED | OUTPATIENT
Start: 2023-03-30 | End: 2023-03-30 | Stop reason: HOSPADM

## 2023-03-30 RX ORDER — HYDRALAZINE HYDROCHLORIDE 20 MG/ML
10 INJECTION INTRAMUSCULAR; INTRAVENOUS
Status: DISCONTINUED | OUTPATIENT
Start: 2023-03-30 | End: 2023-03-30 | Stop reason: HOSPADM

## 2023-03-30 RX ORDER — SODIUM CHLORIDE 0.9 % (FLUSH) 0.9 %
5-40 SYRINGE (ML) INJECTION PRN
Status: DISCONTINUED | OUTPATIENT
Start: 2023-03-30 | End: 2023-03-30 | Stop reason: HOSPADM

## 2023-03-30 RX ORDER — ONDANSETRON 2 MG/ML
4 INJECTION INTRAMUSCULAR; INTRAVENOUS
Status: COMPLETED | OUTPATIENT
Start: 2023-03-30 | End: 2023-03-30

## 2023-03-30 RX ORDER — FENTANYL CITRATE 50 UG/ML
INJECTION, SOLUTION INTRAMUSCULAR; INTRAVENOUS PRN
Status: DISCONTINUED | OUTPATIENT
Start: 2023-03-30 | End: 2023-03-30 | Stop reason: SDUPTHER

## 2023-03-30 RX ORDER — DEXAMETHASONE SODIUM PHOSPHATE 4 MG/ML
INJECTION, SOLUTION INTRA-ARTICULAR; INTRALESIONAL; INTRAMUSCULAR; INTRAVENOUS; SOFT TISSUE PRN
Status: DISCONTINUED | OUTPATIENT
Start: 2023-03-30 | End: 2023-03-30 | Stop reason: SDUPTHER

## 2023-03-30 RX ORDER — MIDAZOLAM HYDROCHLORIDE 1 MG/ML
INJECTION INTRAMUSCULAR; INTRAVENOUS PRN
Status: DISCONTINUED | OUTPATIENT
Start: 2023-03-30 | End: 2023-03-30 | Stop reason: SDUPTHER

## 2023-03-30 RX ORDER — SODIUM CHLORIDE 9 MG/ML
INJECTION, SOLUTION INTRAVENOUS PRN
Status: DISCONTINUED | OUTPATIENT
Start: 2023-03-30 | End: 2023-03-30 | Stop reason: HOSPADM

## 2023-03-30 RX ORDER — MAGNESIUM HYDROXIDE 1200 MG/15ML
LIQUID ORAL CONTINUOUS PRN
Status: DISCONTINUED | OUTPATIENT
Start: 2023-03-30 | End: 2023-03-30 | Stop reason: HOSPADM

## 2023-03-30 RX ORDER — SODIUM CHLORIDE, SODIUM LACTATE, POTASSIUM CHLORIDE, CALCIUM CHLORIDE 600; 310; 30; 20 MG/100ML; MG/100ML; MG/100ML; MG/100ML
INJECTION, SOLUTION INTRAVENOUS CONTINUOUS
Status: DISCONTINUED | OUTPATIENT
Start: 2023-03-30 | End: 2023-03-30 | Stop reason: HOSPADM

## 2023-03-30 RX ORDER — OXYCODONE HYDROCHLORIDE 5 MG/1
5 TABLET ORAL PRN
Status: COMPLETED | OUTPATIENT
Start: 2023-03-30 | End: 2023-03-30

## 2023-03-30 RX ORDER — SODIUM CHLORIDE 9 MG/ML
25 INJECTION, SOLUTION INTRAVENOUS PRN
Status: DISCONTINUED | OUTPATIENT
Start: 2023-03-30 | End: 2023-03-30 | Stop reason: HOSPADM

## 2023-03-30 RX ORDER — OXYCODONE HYDROCHLORIDE AND ACETAMINOPHEN 5; 325 MG/1; MG/1
1 TABLET ORAL EVERY 6 HOURS PRN
Qty: 12 TABLET | Refills: 0 | Status: SHIPPED | OUTPATIENT
Start: 2023-03-30 | End: 2023-04-02

## 2023-03-30 RX ORDER — ONDANSETRON 2 MG/ML
INJECTION INTRAMUSCULAR; INTRAVENOUS PRN
Status: DISCONTINUED | OUTPATIENT
Start: 2023-03-30 | End: 2023-03-30 | Stop reason: SDUPTHER

## 2023-03-30 RX ORDER — DIPHENHYDRAMINE HYDROCHLORIDE 50 MG/ML
12.5 INJECTION INTRAMUSCULAR; INTRAVENOUS
Status: DISCONTINUED | OUTPATIENT
Start: 2023-03-30 | End: 2023-03-30 | Stop reason: HOSPADM

## 2023-03-30 RX ORDER — FENTANYL CITRATE 0.05 MG/ML
25 INJECTION, SOLUTION INTRAMUSCULAR; INTRAVENOUS EVERY 5 MIN PRN
Status: DISCONTINUED | OUTPATIENT
Start: 2023-03-30 | End: 2023-03-30 | Stop reason: HOSPADM

## 2023-03-30 RX ORDER — CEFAZOLIN SODIUM 1 G/3ML
INJECTION, POWDER, FOR SOLUTION INTRAMUSCULAR; INTRAVENOUS PRN
Status: DISCONTINUED | OUTPATIENT
Start: 2023-03-30 | End: 2023-03-30 | Stop reason: HOSPADM

## 2023-03-30 RX ORDER — MEPERIDINE HYDROCHLORIDE 25 MG/ML
12.5 INJECTION INTRAMUSCULAR; INTRAVENOUS; SUBCUTANEOUS EVERY 5 MIN PRN
Status: DISCONTINUED | OUTPATIENT
Start: 2023-03-30 | End: 2023-03-30 | Stop reason: HOSPADM

## 2023-03-30 RX ORDER — SODIUM CHLORIDE, SODIUM LACTATE, POTASSIUM CHLORIDE, CALCIUM CHLORIDE 600; 310; 30; 20 MG/100ML; MG/100ML; MG/100ML; MG/100ML
INJECTION, SOLUTION INTRAVENOUS CONTINUOUS PRN
Status: DISCONTINUED | OUTPATIENT
Start: 2023-03-30 | End: 2023-03-30 | Stop reason: SDUPTHER

## 2023-03-30 RX ORDER — CEFAZOLIN SODIUM IN 0.9 % NACL 2 G/100 ML
2000 PLASTIC BAG, INJECTION (ML) INTRAVENOUS
Status: COMPLETED | OUTPATIENT
Start: 2023-03-30 | End: 2023-03-30

## 2023-03-30 RX ORDER — CETIRIZINE HYDROCHLORIDE 10 MG/1
10 TABLET ORAL DAILY
COMMUNITY

## 2023-03-30 RX ORDER — DIPHENHYDRAMINE HYDROCHLORIDE 50 MG/ML
INJECTION INTRAMUSCULAR; INTRAVENOUS PRN
Status: DISCONTINUED | OUTPATIENT
Start: 2023-03-30 | End: 2023-03-30 | Stop reason: SDUPTHER

## 2023-03-30 RX ORDER — LIDOCAINE HYDROCHLORIDE 10 MG/ML
INJECTION, SOLUTION EPIDURAL; INFILTRATION; INTRACAUDAL; PERINEURAL PRN
Status: DISCONTINUED | OUTPATIENT
Start: 2023-03-30 | End: 2023-03-30 | Stop reason: SDUPTHER

## 2023-03-30 RX ORDER — OXYCODONE HYDROCHLORIDE 5 MG/1
10 TABLET ORAL PRN
Status: COMPLETED | OUTPATIENT
Start: 2023-03-30 | End: 2023-03-30

## 2023-03-30 RX ORDER — FENTANYL CITRATE 0.05 MG/ML
50 INJECTION, SOLUTION INTRAMUSCULAR; INTRAVENOUS EVERY 5 MIN PRN
Status: DISCONTINUED | OUTPATIENT
Start: 2023-03-30 | End: 2023-03-30 | Stop reason: HOSPADM

## 2023-03-30 RX ADMIN — OXYCODONE HYDROCHLORIDE 5 MG: 5 TABLET ORAL at 10:21

## 2023-03-30 RX ADMIN — FENTANYL CITRATE 25 MCG: 50 INJECTION, SOLUTION INTRAMUSCULAR; INTRAVENOUS at 08:10

## 2023-03-30 RX ADMIN — LIDOCAINE HYDROCHLORIDE 50 MG: 10 INJECTION, SOLUTION EPIDURAL; INFILTRATION; INTRACAUDAL; PERINEURAL at 07:35

## 2023-03-30 RX ADMIN — PROPOFOL 160 MG: 10 INJECTION, EMULSION INTRAVENOUS at 07:35

## 2023-03-30 RX ADMIN — FENTANYL CITRATE 50 MCG: 50 INJECTION, SOLUTION INTRAMUSCULAR; INTRAVENOUS at 08:07

## 2023-03-30 RX ADMIN — ONDANSETRON 4 MG: 2 INJECTION INTRAMUSCULAR; INTRAVENOUS at 08:19

## 2023-03-30 RX ADMIN — FENTANYL CITRATE 50 MCG: 0.05 INJECTION, SOLUTION INTRAMUSCULAR; INTRAVENOUS at 09:22

## 2023-03-30 RX ADMIN — FENTANYL CITRATE 25 MCG: 0.05 INJECTION, SOLUTION INTRAMUSCULAR; INTRAVENOUS at 09:33

## 2023-03-30 RX ADMIN — SODIUM CHLORIDE 1000 ML: 9 INJECTION, SOLUTION INTRAVENOUS at 06:44

## 2023-03-30 RX ADMIN — DEXAMETHASONE SODIUM PHOSPHATE 4 MG: 4 INJECTION, SOLUTION INTRAMUSCULAR; INTRAVENOUS at 07:35

## 2023-03-30 RX ADMIN — SODIUM CHLORIDE, POTASSIUM CHLORIDE, SODIUM LACTATE AND CALCIUM CHLORIDE: 600; 310; 30; 20 INJECTION, SOLUTION INTRAVENOUS at 08:22

## 2023-03-30 RX ADMIN — Medication 2000 MG: at 07:44

## 2023-03-30 RX ADMIN — FENTANYL CITRATE 50 MCG: 0.05 INJECTION, SOLUTION INTRAMUSCULAR; INTRAVENOUS at 09:15

## 2023-03-30 RX ADMIN — FENTANYL CITRATE 25 MCG: 50 INJECTION, SOLUTION INTRAMUSCULAR; INTRAVENOUS at 07:35

## 2023-03-30 RX ADMIN — MIDAZOLAM HYDROCHLORIDE 2 MG: 1 INJECTION, SOLUTION INTRAMUSCULAR; INTRAVENOUS at 07:28

## 2023-03-30 RX ADMIN — ONDANSETRON 4 MG: 2 INJECTION INTRAMUSCULAR; INTRAVENOUS at 10:14

## 2023-03-30 RX ADMIN — DIPHENHYDRAMINE HYDROCHLORIDE 50 MG: 50 INJECTION, SOLUTION INTRAMUSCULAR; INTRAVENOUS at 08:20

## 2023-03-30 ASSESSMENT — PAIN SCALES - GENERAL
PAINLEVEL_OUTOF10: 6
PAINLEVEL_OUTOF10: 5
PAINLEVEL_OUTOF10: 7
PAINLEVEL_OUTOF10: 5
PAINLEVEL_OUTOF10: 7
PAINLEVEL_OUTOF10: 7

## 2023-03-30 ASSESSMENT — PAIN DESCRIPTION - DESCRIPTORS
DESCRIPTORS: BURNING
DESCRIPTORS: BURNING
DESCRIPTORS: STABBING
DESCRIPTORS: BURNING
DESCRIPTORS: BURNING

## 2023-03-30 ASSESSMENT — PAIN DESCRIPTION - LOCATION
LOCATION: ABDOMEN

## 2023-03-30 ASSESSMENT — PAIN DESCRIPTION - ORIENTATION: ORIENTATION: MID

## 2023-03-30 ASSESSMENT — PAIN - FUNCTIONAL ASSESSMENT: PAIN_FUNCTIONAL_ASSESSMENT: NONE - DENIES PAIN

## 2023-03-30 NOTE — ANESTHESIA POSTPROCEDURE EVALUATION
Department of Anesthesiology  Postprocedure Note    Patient: Tresa Juarez  MRN: [id number]  YOB: 1965  Date of evaluation: 3/30/2023      Procedure Summary     Date: 03/30/23 Room / Location: [address]    Anesthesia Start: 3728 Anesthesia Stop: 7948    Procedure: Incisional hernia repair with mesh (Abdomen) Diagnosis:       Incisional hernia without obstruction or gangrene      (Incisional hernia without obstruction or gangrene [K43.2])    Surgeons: Denae Sharma MD Responsible Provider: Marcell Allen MD    Anesthesia Type: general ASA Status: 2          Anesthesia Type: No value filed.     Minal Phase I: Minal Score: 10    Minal Phase II:        Anesthesia Post Evaluation    Patient location during evaluation: bedside  Patient participation: complete - patient participated  Level of consciousness: awake and sleepy but conscious  Airway patency: patent  Nausea & Vomiting: no nausea and no vomiting  Complications: no  Cardiovascular status: blood pressure returned to baseline and hemodynamically stable  Respiratory status: acceptable  Hydration status: euvolemic

## 2023-03-30 NOTE — OP NOTE
[address]                      [address], [address] [facility]                                OPERATIVE REPORT    PATIENT NAME: Coty Johnson                      :        1965  MED REC NO:   [id number]                            ROOM:  ACCOUNT NO:   [de-identified]                           ADMIT DATE: 2023  PROVIDER:     Chuy Encinas MD    DATE OF PROCEDURE:  2023    PREOPERATIVE DIAGNOSIS:  Incisional hernia. PREOPERATIVE DIAGNOSIS:  10 x 10 cm incisional hernia. PROCEDURES PERFORMED:  Incisional hernia repair with Ventrio mesh. SURGEON:  Chuy Encinas MD    ASSISTANT:  Ms. Ivy Hartley. ANESTHESIA:  1. General anesthesia. 2.  Bilateral TAP block. ESTIMATED BLOOD LOSS:  50 mL. SPECIMENS:  None. COMPLICATIONS:  None. INDICATIONS:  This is a 13-year-old female with a large incisional  hernia present from previous laparotomy. Risks and benefits of repair  in the office described. Risks of infection, bleeding, postoperative  hematoma, seroma, and recurrence were all addressed. Postoperative pain  discussed. Despite the risks, she wished to proceed. Consent obtained. OPERATIVE PROCEDURE:  She was taken to the operating room, placed in the  supine position. General anesthesia was administered. Rothman catheter  was placed preoperatively. Her colostomy was kept away from the  surgical field with [place]. Her abdomen was prepped and draped with a  ChloraPrep-containing solution prior to [place] being placed. She received Ancef preoperatively. A time-out was taken for appropriate  verification. An incision was made over the palpable defect. A Rothman catheter had  been placed preoperatively. Subcutaneous tissues were incised to the  hernia sac, which was encountered and freed up circumferentially. The  sac was opened. No significant adhesions were present. The sac was  amputated completely.     A 17 x 13 cm piece of

## 2023-03-30 NOTE — PROGRESS NOTES
CLINICAL PHARMACY NOTE: MEDS TO BEDS    Total # of Prescriptions Filled: 1   The following medications were delivered to the patient:  Oxycodone/APAP 5-325 mg tab    Additional Documentation: 

## 2023-03-30 NOTE — ANESTHESIA PRE PROCEDURE
Time of last solid consumption: 1715                        Date of last liquid consumption: 03/29/23                        Date of last solid food consumption: 03/29/23    BMI:   Wt Readings from Last 3 Encounters:   03/30/23 150 lb (68 kg)   03/21/23 144 lb (65.3 kg)   03/07/23 144 lb (65.3 kg)     Body mass index is 24.96 kg/m². CBC:   Lab Results   Component Value Date/Time    WBC 6.9 11/17/2022 11:18 AM    RBC 4.27 11/17/2022 11:18 AM    HGB 11.7 11/17/2022 11:18 AM    HCT 36.8 11/17/2022 11:18 AM    MCV 86.2 11/17/2022 11:18 AM    RDW 17.4 11/17/2022 11:18 AM     11/17/2022 11:18 AM       CMP:   Lab Results   Component Value Date/Time     10/06/2022 03:18 PM    K 4.4 10/06/2022 03:18 PM    K 3.6 08/27/2022 04:22 AM     10/06/2022 03:18 PM    CO2 30 10/06/2022 03:18 PM    BUN 16 10/06/2022 03:18 PM    CREATININE 0.57 10/06/2022 03:18 PM    GFRAA >60.0 10/06/2022 03:18 PM    LABGLOM >60.0 10/06/2022 03:18 PM    GLUCOSE 100 10/06/2022 03:18 PM    PROT 7.0 09/23/2022 10:30 AM    CALCIUM 9.7 10/06/2022 03:18 PM    BILITOT 0.3 09/23/2022 10:30 AM    ALKPHOS 236 09/23/2022 10:30 AM    AST 11 09/23/2022 10:30 AM    ALT 17 09/23/2022 10:30 AM       POC Tests: No results for input(s): POCGLU, POCNA, POCK, POCCL, POCBUN, POCHEMO, POCHCT in the last 72 hours.     Coags:   Lab Results   Component Value Date/Time    PROTIME 13.4 10/06/2022 03:15 PM    INR 1.0 10/06/2022 03:15 PM    APTT 32.4 10/06/2022 03:15 PM       HCG (If Applicable):   Lab Results   Component Value Date    PREGTESTUR Negative 11/09/2016        ABGs: No results found for: PHART, PO2ART, QZL1RJN, CYW8XQD, BEART, T3ADYVOI     Type & Screen (If Applicable):  No results found for: LABABO, LABRH    Drug/Infectious Status (If Applicable):  No results found for: HIV, HEPCAB    COVID-19 Screening (If Applicable): No results found for: COVID19        Anesthesia Evaluation  Patient summary reviewed and Nursing notes reviewed   history of

## 2023-03-30 NOTE — ANESTHESIA PROCEDURE NOTES
Peripheral Block    Patient location during procedure: pre-op  Reason for block: post-op pain management and at surgeon's request  Start time: 3/30/2023 7:38 AM  End time: 3/30/2023 7:43 AM  Staffing  Performed: anesthesiologist   Anesthesiologist: Verónica Lao MD  Preanesthetic Checklist  Completed: patient identified, IV checked, site marked, risks and benefits discussed, surgical/procedural consents, equipment checked, pre-op evaluation, timeout performed, anesthesia consent given, oxygen available and monitors applied/VS acknowledged  Peripheral Block   Patient position: supine  Prep: ChloraPrep  Provider prep: mask and sterile gloves (Sterile probe cover)  Patient monitoring: cardiac monitor, continuous pulse ox, frequent blood pressure checks and IV access  Block type: TAP  Laterality: bilateral  Injection technique: single-shot  Guidance: nerve stimulator and ultrasound guided  Local infiltration: bupivacaine  Infiltration strength: 0.5 %  Local infiltration: bupivacaine  Dose: 30 mL    Needle   Needle type: combined needle/nerve stimulator   Needle gauge: 22 G  Needle localization: anatomical landmarks and ultrasound guidance  Needle length: 10 cm  Assessment   Injection assessment: negative aspiration for heme, no paresthesia on injection and local visualized surrounding nerve on ultrasound  Paresthesia pain: none  Slow fractionated injection: yes  Hemodynamics: stable  Real-time US image taken/store: yes  Outcomes: uncomplicated    Additional Notes  Ultrasound image printed and saved in patient chart.     Sterile probe cover used    15 mls 0.5% bupivacaine mixed with 5 mls NS given per side

## 2023-03-30 NOTE — DISCHARGE INSTRUCTIONS
Dressing x48 hours    May shower 48 hours    No driving while on pain medication    No lifting greater than 10 pounds for the next 2 weeks    May take stairs

## 2023-04-07 ENCOUNTER — OFFICE VISIT (OUTPATIENT)
Dept: SURGERY | Age: 58
End: 2023-04-07

## 2023-04-07 VITALS
OXYGEN SATURATION: 99 % | BODY MASS INDEX: 24.99 KG/M2 | HEIGHT: 65 IN | WEIGHT: 150 LBS | TEMPERATURE: 97.3 F | HEART RATE: 75 BPM

## 2023-04-07 DIAGNOSIS — K43.2 INCISIONAL HERNIA, WITHOUT OBSTRUCTION OR GANGRENE: Primary | ICD-10-CM

## 2023-04-07 PROCEDURE — 99024 POSTOP FOLLOW-UP VISIT: CPT | Performed by: COLON & RECTAL SURGERY

## 2023-04-07 NOTE — PROGRESS NOTES
Subjective:      Patient ID: Martín Buckner is a 61 y.o. female who presents for:  Chief Complaint   Patient presents with    Post-Op Check       She returns to the office 8 days out from an incisional hernia repair with mesh. She is doing well. Her colostomy is functioning. She denies any significant pain. She is eating well.       Past Medical History:   Diagnosis Date    Diverticulitis     History of blood transfusion     Aug 25th, Jan 2000 s/p birth of son    PONV (postoperative nausea and vomiting)     Retention of urine      Past Surgical History:   Procedure Laterality Date    BLADDER SURGERY Left 10/10/2022    CYSTOSCOPY WITH LEFT RETROGRADE PYELOGRAM AND POSSIBLE LEFT DOUBLE J STENT REMOVAL AND REPLACEMENT performed by Shamar Bell MD at [place] [address] Left 11/21/2022    CYSTOSCOPY WITH LEFT DOUBLE J STENT REMOVAL AND LEFT RETROGRADE PYELOGRAM. performed by Shamar Bell MD at [address] [place] [address] N/A 08/25/2022    SIGMOIDECTOMY WITH ABDOMINAL HYSTERECTOMY WITH BSO, END COLOSTOMY, ENTERECTOMY, CYSTOSCOPY WITH URETERAL STENT PLACEMENT performed by Zia Marcelino MD at [address] [facility] N/A 1/9/2023    COLONOSCOPY performed by Zia Marcelino MD at [address] [facility], COLON, DIAGNOSTIC      HYSTERECTOMY (CERVIX STATUS UNKNOWN)      SKIN BIOPSY      on back unsure date    TONSILLECTOMY      at age 23    [facility] N/A 3/30/2023    Incisional hernia repair with mesh performed by Zia Marcelino MD at [address] [place] [address] History     Socioeconomic History    Marital status:      Spouse name: Not on file    Number of children: Not on file    Years of education: Not on file    Highest education level: Not on file   Occupational History    Not on file   Tobacco Use    Smoking status: Former     Packs/day: 1.50     Years: 42.00     Pack years: 63.00     Types: Cigarettes     Start date: 10/15/1980     Quit date: 8/11/2022     Years

## 2023-05-09 NOTE — BRIEF OP NOTE
Brief Postoperative Note      Patient: Irene Vega  YOB: 1965  MRN: [id number]    Date of Procedure: 3/30/2023    Pre-Op Diagnosis: Incisional hernia without obstruction or gangrene [K43.2]    Post-Op Diagnosis:  10 x 10 cm incisional hernia       Procedure(s):  Incisional hernia repair with mesh using 13 x 17 cm Ventrio mesh    Surgeon(s):  Bryan Elizabeth MD    Assistant:  First Assistant: Kanwal Castillo    Anesthesia: General, bilateral tap    Estimated Blood Loss (mL): 50    Complications: None    Specimens:   * No specimens in log *    Implants:  Implant Name Type Inv.  Item Serial No.  Lot No. LRB No. Used Action   PATCH RUI L W5.4XL7IN UNCOATED MFIL PROPYLENE OVL ABSRB - [id number]  PATCH RUI L W5.4XL7IN UNCOATED MFIL PROPYLENE OVL ABSRB  BARD DAVOL-WD [id number] N/A 1 Implanted         Drains:   Colostomy LUQ (Active)       [REMOVED] Urinary Catheter 03/30/23 Rothman-Temperature (Removed)       Findings: Large nonobstructing incisional hernia    Electronically signed by Donnie Ibarra MD on 3/30/2023 at 9:02 AM Stelara Pregnancy And Lactation Text: This medication is Pregnancy Category B and is considered safe during pregnancy. It is unknown if this medication is excreted in breast milk. Metronidazole Pregnancy And Lactation Text: This medication is Pregnancy Category B and considered safe during pregnancy.  It is also excreted in breast milk. Isotretinoin Pregnancy And Lactation Text: This medication is Pregnancy Category X and is considered extremely dangerous during pregnancy. It is unknown if it is excreted in breast milk. Colchicine Counseling:  Patient counseled regarding adverse effects including but not limited to stomach upset (nausea, vomiting, stomach pain, or diarrhea).  Patient instructed to limit alcohol consumption while taking this medication.  Colchicine may reduce blood counts especially with prolonged use.  The patient understands that monitoring of kidney function and blood counts may be required, especially at baseline. The patient verbalized understanding of the proper use and possible adverse effects of colchicine.  All of the patient's questions and concerns were addressed. Hydroxychloroquine Pregnancy And Lactation Text: This medication has been shown to cause fetal harm but it isn't assigned a Pregnancy Risk Category. There are small amounts excreted in breast milk. Tetracycline Counseling: Patient counseled regarding possible photosensitivity and increased risk for sunburn.  Patient instructed to avoid sunlight, if possible.  When exposed to sunlight, patients should wear protective clothing, sunglasses, and sunscreen.  The patient was instructed to call the office immediately if the following severe adverse effects occur:  hearing changes, easy bruising/bleeding, severe headache, or vision changes.  The patient verbalized understanding of the proper use and possible adverse effects of tetracycline.  All of the patient's questions and concerns were addressed. Patient understands to avoid pregnancy while on therapy due to potential birth defects. Protopic Pregnancy And Lactation Text: This medication is Pregnancy Category C. It is unknown if this medication is excreted in breast milk when applied topically. Olanzapine Pregnancy And Lactation Text: This medication is pregnancy category C.   There are no adequate and well controlled trials with olanzapine in pregnant females.  Olanzapine should be used during pregnancy only if the potential benefit justifies the potential risk to the fetus.   In a study in lactating healthy women, olanzapine was excreted in breast milk.  It is recommended that women taking olanzapine should not breast feed. Topical Sulfur Applications Counseling: Topical Sulfur Counseling: Patient counseled that this medication may cause skin irritation or allergic reactions.  In the event of skin irritation, the patient was advised to reduce the amount of the drug applied or use it less frequently.   The patient verbalized understanding of the proper use and possible adverse effects of topical sulfur application.  All of the patient's questions and concerns were addressed. Zoryve Pregnancy And Lactation Text: It is unknown if this medication can cause problems during pregnancy and breastfeeding. Benzoyl Peroxide Pregnancy And Lactation Text: This medication is Pregnancy Category C. It is unknown if benzoyl peroxide is excreted in breast milk. Dupixent Counseling: I discussed with the patient the risks of dupilumab including but not limited to eye infection and irritation, cold sores, injection site reactions, worsening of asthma, allergic reactions and increased risk of parasitic infection.  Live vaccines should be avoided while taking dupilumab. Dupilumab will also interact with certain medications such as warfarin and cyclosporine. The patient understands that monitoring is required and they must alert us or the primary physician if symptoms of infection or other concerning signs are noted. Albendazole Counseling:  I discussed with the patient the risks of albendazole including but not limited to cytopenia, kidney damage, nausea/vomiting and severe allergy.  The patient understands that this medication is being used in an off-label manner. Dupixent Pregnancy And Lactation Text: This medication likely crosses the placenta but the risk for the fetus is uncertain. This medication is excreted in breast milk. Birth Control Pills Pregnancy And Lactation Text: This medication should be avoided if pregnant and for the first 30 days post-partum. Cibinqo Counseling: I discussed with the patient the risks of Cibinqo therapy including but not limited to common cold, nausea, headache, cold sores, increased blood CPK levels, dizziness, UTIs, fatigue, acne, and vomitting. Live vaccines should be avoided.  This medication has been linked to serious infections; higher rate of mortality; malignancy and lymphoproliferative disorders; major adverse cardiovascular events; thrombosis; thrombocytopenia and lymphopenia; lipid elevations; and retinal detachment. Oral Minoxidil Counseling- I discussed with the patient the risks of oral minoxidil including but not limited to shortness of breath, swelling of the feet or ankles, dizziness, lightheadedness, unwanted hair growth and allergic reaction.  The patient verbalized understanding of the proper use and possible adverse effects of oral minoxidil.  All of the patient's questions and concerns were addressed. Sski Pregnancy And Lactation Text: This medication is Pregnancy Category D and isn't considered safe during pregnancy. It is excreted in breast milk. Topical Retinoid Pregnancy And Lactation Text: This medication is Pregnancy Category C. It is unknown if this medication is excreted in breast milk. Zyclara Counseling:  I discussed with the patient the risks of imiquimod including but not limited to erythema, scaling, itching, weeping, crusting, and pain.  Patient understands that the inflammatory response to imiquimod is variable from person to person and was educated regarded proper titration schedule.  If flu-like symptoms develop, patient knows to discontinue the medication and contact us. Cephalexin Counseling: I counseled the patient regarding use of cephalexin as an antibiotic for prophylactic and/or therapeutic purposes. Cephalexin (commonly prescribed under brand name Keflex) is a cephalosporin antibiotic which is active against numerous classes of bacteria, including most skin bacteria. Side effects may include nausea, diarrhea, gastrointestinal upset, rash, hives, yeast infections, and in rare cases, hepatitis, kidney disease, seizures, fever, confusion, neurologic symptoms, and others. Patients with severe allergies to penicillin medications are cautioned that there is about a 10% incidence of cross-reactivity with cephalosporins. When possible, patients with penicillin allergies should use alternatives to cephalosporins for antibiotic therapy. Cellcept Pregnancy And Lactation Text: This medication is Pregnancy Category D and isn't considered safe during pregnancy. It is unknown if this medication is excreted in breast milk. Libtayo Counseling- I discussed with the patient the risks of Libtayo including but not limited to nausea, vomiting, diarrhea, and bone or muscle pain.  The patient verbalized understanding of the proper use and possible adverse effects of Libtayo.  All of the patient's questions and concerns were addressed. Rituxan Counseling:  I discussed with the patient the risks of Rituxan infusions. Side effects can include infusion reactions, severe drug rashes including mucocutaneous reactions, reactivation of latent hepatitis and other infections and rarely progressive multifocal leukoencephalopathy.  All of the patient's questions and concerns were addressed. Ketoconazole Pregnancy And Lactation Text: This medication is Pregnancy Category C and it isn't know if it is safe during pregnancy. It is also excreted in breast milk and breast feeding isn't recommended. Minoxidil Counseling: Minoxidil is a topical medication which can increase blood flow where it is applied. It is uncertain how this medication increases hair growth. Side effects are uncommon and include stinging and allergic reactions. Libtayo Pregnancy And Lactation Text: This medication is contraindicated in pregnancy and when breast feeding. Minocycline Counseling: Patient advised regarding possible photosensitivity and discoloration of the teeth, skin, lips, tongue and gums.  Patient instructed to avoid sunlight, if possible.  When exposed to sunlight, patients should wear protective clothing, sunglasses, and sunscreen.  The patient was instructed to call the office immediately if the following severe adverse effects occur:  hearing changes, easy bruising/bleeding, severe headache, or vision changes.  The patient verbalized understanding of the proper use and possible adverse effects of minocycline.  All of the patient's questions and concerns were addressed. Minoxidil Pregnancy And Lactation Text: This medication has not been assigned a Pregnancy Risk Category but animal studies failed to show danger with the topical medication. It is unknown if the medication is excreted in breast milk. Terbinafine Counseling: Patient counseling regarding adverse effects of terbinafine including but not limited to headache, diarrhea, rash, upset stomach, liver function test abnormalities, itching, taste/smell disturbance, nausea, abdominal pain, and flatulence.  There is a rare possibility of liver failure that can occur when taking terbinafine.  The patient understands that a baseline LFT and kidney function test may be required. The patient verbalized understanding of the proper use and possible adverse effects of terbinafine.  All of the patient's questions and concerns were addressed. Low Dose Naltrexone Counseling- I discussed with the patient the potential risks and side effects of low dose naltrexone including but not limited to: more vivid dreams, headaches, nausea, vomiting, abdominal pain, fatigue, dizziness, and anxiety. High Dose Vitamin A Counseling: Side effects reviewed, pt to contact office should one occur. Topical Sulfur Applications Pregnancy And Lactation Text: This medication is considered safe during pregnancy and breast feeding secondary to limited systemic absorption. Taltz Counseling: I discussed with the patient the risks of ixekizumab including but not limited to immunosuppression, serious infections, worsening of inflammatory bowel disease and drug reactions.  The patient understands that monitoring is required including a PPD at baseline and must alert us or the primary physician if symptoms of infection or other concerning signs are noted. Tetracycline Pregnancy And Lactation Text: This medication is Pregnancy Category D and not consider safe during pregnancy. It is also excreted in breast milk. Carac Counseling:  I discussed with the patient the risks of Carac including but not limited to erythema, scaling, itching, weeping, crusting, and pain. Qbrexza Counseling:  I discussed with the patient the risks of Qbrexza including but not limited to headache, mydriasis, blurred vision, dry eyes, nasal dryness, dry mouth, dry throat, dry skin, urinary hesitation, and constipation.  Local skin reactions including erythema, burning, stinging, and itching can also occur. Carac Pregnancy And Lactation Text: This medication is Pregnancy Category X and contraindicated in pregnancy and in women who may become pregnant. It is unknown if this medication is excreted in breast milk. Low Dose Naltrexone Pregnancy And Lactation Text: Naltrexone is pregnancy category C.  There have been no adequate and well-controlled studies in pregnant women.  It should be used in pregnancy only if the potential benefit justifies the potential risk to the fetus.   Limited data indicates that naltrexone is minimally excreted into breastmilk. Qbrexza Pregnancy And Lactation Text: There is no available data on Qbrexza use in pregnant women.  There is no available data on Qbrexza use in lactation. Oral Minoxidil Pregnancy And Lactation Text: This medication should only be used when clearly needed if you are pregnant, attempting to become pregnant or breast feeding. Albendazole Pregnancy And Lactation Text: This medication is Pregnancy Category C and it isn't known if it is safe during pregnancy. It is also excreted in breast milk. Xeljanz Counseling: I discussed with the patient the risks of Xeljanz therapy including increased risk of infection, liver issues, headache, diarrhea, or cold symptoms. Live vaccines should be avoided. They were instructed to call if they have any problems. Cyclophosphamide Counseling:  I discussed with the patient the risks of cyclophosphamide including but not limited to hair loss, hormonal abnormalities, decreased fertility, abdominal pain, diarrhea, nausea and vomiting, bone marrow suppression and infection. The patient understands that monitoring is required while taking this medication. Tazorac Counseling:  Patient advised that medication is irritating and drying.  Patient may need to apply sparingly and wash off after an hour before eventually leaving it on overnight.  The patient verbalized understanding of the proper use and possible adverse effects of tazorac.  All of the patient's questions and concerns were addressed. Detail Level: Simple Thalidomide Counseling: I discussed with the patient the risks of thalidomide including but not limited to birth defects, anxiety, weakness, chest pain, dizziness, cough and severe allergy. Spironolactone Counseling: Patient advised regarding risks of diarrhea, abdominal pain, hyperkalemia, birth defects (for female patients), liver toxicity and renal toxicity. The patient may need blood work to monitor liver and kidney function and potassium levels while on therapy. The patient verbalized understanding of the proper use and possible adverse effects of spironolactone.  All of the patient's questions and concerns were addressed. Cibinqo Pregnancy And Lactation Text: It is unknown if this medication will adversely affect pregnancy or breast feeding.  You should not take this medication if you are currently pregnant or planning a pregnancy or while breastfeeding. Enbrel Counseling:  I discussed with the patient the risks of etanercept including but not limited to myelosuppression, immunosuppression, autoimmune hepatitis, demyelinating diseases, lymphoma, and infections.  The patient understands that monitoring is required including a PPD at baseline and must alert us or the primary physician if symptoms of infection or other concerning signs are noted. Cephalexin Pregnancy And Lactation Text: This medication is Pregnancy Category B and considered safe during pregnancy.  It is also excreted in breast milk but can be used safely for shorter doses. Colchicine Pregnancy And Lactation Text: This medication is Pregnancy Category C and isn't considered safe during pregnancy. It is excreted in breast milk. Eucrisa Counseling: Patient may experience a mild burning sensation during topical application. Eucrisa is not approved in children less than 3 months of age. Rituxan Pregnancy And Lactation Text: This medication is Pregnancy Category C and it isn't know if it is safe during pregnancy. It is unknown if this medication is excreted in breast milk but similar antibodies are known to be excreted. Siliq Counseling:  I discussed with the patient the risks of Siliq including but not limited to new or worsening depression, suicidal thoughts and behavior, immunosuppression, malignancy, posterior leukoencephalopathy syndrome, and serious infections.  The patient understands that monitoring is required including a PPD at baseline and must alert us or the primary physician if symptoms of infection or other concerning signs are noted. There is also a special program designed to monitor depression which is required with Siliq. Cyclophosphamide Pregnancy And Lactation Text: This medication is Pregnancy Category D and it isn't considered safe during pregnancy. This medication is excreted in breast milk. Thalidomide Pregnancy And Lactation Text: This medication is Pregnancy Category X and is absolutely contraindicated during pregnancy. It is unknown if it is excreted in breast milk. Clindamycin Counseling: I counseled the patient regarding use of clindamycin as an antibiotic for prophylactic and/or therapeutic purposes. Clindamycin is active against numerous classes of bacteria, including skin bacteria. Side effects may include nausea, diarrhea, gastrointestinal upset, rash, hives, yeast infections, and in rare cases, colitis. Fluconazole Counseling:  Patient counseled regarding adverse effects of fluconazole including but not limited to headache, diarrhea, nausea, upset stomach, liver function test abnormalities, taste disturbance, and stomach pain.  There is a rare possibility of liver failure that can occur when taking fluconazole.  The patient understands that monitoring of LFTs and kidney function test may be required, especially at baseline. The patient verbalized understanding of the proper use and possible adverse effects of fluconazole.  All of the patient's questions and concerns were addressed. Odomzo Counseling- I discussed with the patient the risks of Odomzo including but not limited to nausea, vomiting, diarrhea, constipation, weight loss, changes in the sense of taste, decreased appetite, muscle spasms, and hair loss.  The patient verbalized understanding of the proper use and possible adverse effects of Odomzo.  All of the patient's questions and concerns were addressed. Dapsone Counseling: I discussed with the patient the risks of dapsone including but not limited to hemolytic anemia, agranulocytosis, rashes, methemoglobinemia, kidney failure, peripheral neuropathy, headaches, GI upset, and liver toxicity.  Patients who start dapsone require monitoring including baseline LFTs and weekly CBCs for the first month, then every month thereafter.  The patient verbalized understanding of the proper use and possible adverse effects of dapsone.  All of the patient's questions and concerns were addressed. Mirvaso Counseling: Mirvaso is a topical medication which can decrease superficial blood flow where applied. Side effects are uncommon and include stinging, redness and allergic reactions. Terbinafine Pregnancy And Lactation Text: This medication is Pregnancy Category B and is considered safe during pregnancy. It is also excreted in breast milk and breast feeding isn't recommended. Taltz Pregnancy And Lactation Text: The risk during pregnancy and breastfeeding is uncertain with this medication. Wartpeel Counseling:  I discussed with the patient the risks of Wartpeel including but not limited to erythema, scaling, itching, weeping, crusting, and pain. High Dose Vitamin A Pregnancy And Lactation Text: High dose vitamin A therapy is contraindicated during pregnancy and breast feeding. Opioid Counseling: I discussed with the patient the potential side effects of opioids including but not limited to addiction, altered mental status, and depression. I stressed avoiding alcohol, benzodiazepines, muscle relaxants and sleep aids unless specifically okayed by a physician. The patient verbalized understanding of the proper use and possible adverse effects of opioids. All of the patient's questions and concerns were addressed. They were instructed to flush the remaining pills down the toilet if they did not need them for pain. Otezla Counseling: The side effects of Otezla were discussed with the patient, including but not limited to worsening or new depression, weight loss, diarrhea, nausea, upper respiratory tract infection, and headache. Patient instructed to call the office should any adverse effect occur.  The patient verbalized understanding of the proper use and possible adverse effects of Otezla.  All the patient's questions and concerns were addressed. Rhofade Counseling: Rhofade is a topical medication which can decrease superficial blood flow where applied. Side effects are uncommon and include stinging, redness and allergic reactions. Ivermectin Counseling:  Patient instructed to take medication on an empty stomach with a full glass of water.  Patient informed of potential adverse effects including but not limited to nausea, diarrhea, dizziness, itching, and swelling of the extremities or lymph nodes.  The patient verbalized understanding of the proper use and possible adverse effects of ivermectin.  All of the patient's questions and concerns were addressed. Niacinamide Counseling: I recommended taking niacin or niacinamide, also know as vitamin B3, twice daily. Recent evidence suggests that taking vitamin B3 (500 mg twice daily) can reduce the risk of actinic keratoses and non-melanoma skin cancers. Side effects of vitamin B3 include flushing and headache. Xelfabz Pregnancy And Lactation Text: This medication is Pregnancy Category D and is not considered safe during pregnancy.  The risk during breast feeding is also uncertain. Calcipotriene Counseling:  I discussed with the patient the risks of calcipotriene including but not limited to erythema, scaling, itching, and irritation. Olumiant Counseling: I discussed with the patient the risks of Olumiant therapy including but not limited to upper respiratory tract infections, shingles, cold sores, and nausea. Live vaccines should be avoided.  This medication has been linked to serious infections; higher rate of mortality; malignancy and lymphoproliferative disorders; major adverse cardiovascular events; thrombosis; gastrointestinal perforations; neutropenia; lymphopenia; anemia; liver enzyme elevations; and lipid elevations. Include Pregnancy/Lactation Warning?: No Tazorac Pregnancy And Lactation Text: This medication is not safe during pregnancy. It is unknown if this medication is excreted in breast milk. Hydroquinone Counseling:  Patient advised that medication may result in skin irritation, lightening (hypopigmentation), dryness, and burning.  In the event of skin irritation, the patient was advised to reduce the amount of the drug applied or use it less frequently.  Rarely, spots that are treated with hydroquinone can become darker (pseudoochronosis).  Should this occur, patient instructed to stop medication and call the office. The patient verbalized understanding of the proper use and possible adverse effects of hydroquinone.  All of the patient's questions and concerns were addressed. Cimetidine Counseling:  I discussed with the patient the risks of Cimetidine including but not limited to gynecomastia, headache, diarrhea, nausea, drowsiness, arrhythmias, pancreatitis, skin rashes, psychosis, bone marrow suppression and kidney toxicity. Cyclosporine Counseling:  I discussed with the patient the risks of cyclosporine including but not limited to hypertension, gingival hyperplasia,myelosuppression, immunosuppression, liver damage, kidney damage, neurotoxicity, lymphoma, and serious infections. The patient understands that monitoring is required including baseline blood pressure, CBC, CMP, lipid panel and uric acid, and then 1-2 times monthly CMP and blood pressure. Topical Clindamycin Counseling: Patient counseled that this medication may cause skin irritation or allergic reactions.  In the event of skin irritation, the patient was advised to reduce the amount of the drug applied or use it less frequently.   The patient verbalized understanding of the proper use and possible adverse effects of clindamycin.  All of the patient's questions and concerns were addressed. Tranexamic Acid Counseling:  Patient advised of the small risk of bleeding problems with tranexamic acid. They were also instructed to call if they developed any nausea, vomiting or diarrhea. All of the patient's questions and concerns were addressed. Fluconazole Pregnancy And Lactation Text: This medication is Pregnancy Category C and it isn't know if it is safe during pregnancy. It is also excreted in breast milk. Dapsone Pregnancy And Lactation Text: This medication is Pregnancy Category C and is not considered safe during pregnancy or breast feeding. Clindamycin Pregnancy And Lactation Text: This medication can be used in pregnancy if certain situations. Clindamycin is also present in breast milk. Quinolones Counseling:  I discussed with the patient the risks of fluoroquinolones including but not limited to GI upset, allergic reaction, drug rash, diarrhea, dizziness, photosensitivity, yeast infections, liver function test abnormalities, tendonitis/tendon rupture. Calcipotriene Pregnancy And Lactation Text: The use of this medication during pregnancy or lactation is not recommended as there is insufficient data. Tremfya Counseling: I discussed with the patient the risks of guselkumab including but not limited to immunosuppression, serious infections, and drug reactions.  The patient understands that monitoring is required including a PPD at baseline and must alert us or the primary physician if symptoms of infection or other concerning signs are noted. Mirvaso Pregnancy And Lactation Text: This medication has not been assigned a Pregnancy Risk Category. It is unknown if the medication is excreted in breast milk. Cantharidin Pregnancy And Lactation Text: This medication has not been proven safe during pregnancy. It is unknown if this medication is excreted in breast milk. Opioid Pregnancy And Lactation Text: These medications can lead to premature delivery and should be avoided during pregnancy. These medications are also present in breast milk in small amounts. Aklief counseling:  Patient advised to apply a pea-sized amount only at bedtime and wait 30 minutes after washing their face before applying.  If too drying, patient may add a non-comedogenic moisturizer.  The most commonly reported side effects including irritation, redness, scaling, dryness, stinging, burning, itching, and increased risk of sunburn.  The patient verbalized understanding of the proper use and possible adverse effects of retinoids.  All of the patient's questions and concerns were addressed. Gabapentin Counseling: I discussed with the patient the risks of gabapentin including but not limited to dizziness, somnolence, fatigue and ataxia. Opzelura Counseling:  I discussed with the patient the risks of Opzelura including but not limited to nasopharngitis, bronchitis, ear infection, eosinophila, hives, diarrhea, folliculitis, tonsillitis, and rhinorrhea.  Taken orally, this medication has been linked to serious infections; higher rate of mortality; malignancy and lymphoproliferative disorders; major adverse cardiovascular events; thrombosis; thrombocytopenia, anemia, and neutropenia; and lipid elevations. Winlevi Counseling:  I discussed with the patient the risks of topical clascoterone including but not limited to erythema, scaling, itching, and stinging. Patient voiced their understanding. Otezla Pregnancy And Lactation Text: This medication is Pregnancy Category C and it isn't known if it is safe during pregnancy. It is unknown if it is excreted in breast milk. Humira Counseling:  I discussed with the patient the risks of adalimumab including but not limited to myelosuppression, immunosuppression, autoimmune hepatitis, demyelinating diseases, lymphoma, and serious infections.  The patient understands that monitoring is required including a PPD at baseline and must alert us or the primary physician if symptoms of infection or other concerning signs are noted. Cantharidin Counseling:  I discussed with the patient the risks of Cantharidin including but not limited to pain, redness, burning, itching, and blistering. Olumiant Pregnancy And Lactation Text: Based on animal studies, Olumiant may cause embryo-fetal harm when administered to pregnant women.  The medication should not be used in pregnancy.  Breastfeeding is not recommended during treatment. Rinvoq Counseling: I discussed with the patient the risks of Rinvoq therapy including but not limited to upper respiratory tract infections, shingles, cold sores, bronchitis, nausea, cough, fever, acne, and headache. Live vaccines should be avoided.  This medication has been linked to serious infections; higher rate of mortality; malignancy and lymphoproliferative disorders; major adverse cardiovascular events; thrombosis; thrombocytopenia, anemia, and neutropenia; lipid elevations; liver enzyme elevations; and gastrointestinal perforations. Oxybutynin Counseling:  I discussed with the patient the risks of oxybutynin including but not limited to skin rash, drowsiness, dry mouth, difficulty urinating, and blurred vision. 5-Fu Counseling: 5-Fluorouracil Counseling:  I discussed with the patient the risks of 5-fluorouracil including but not limited to erythema, scaling, itching, weeping, crusting, and pain. Doxycycline Counseling:  Patient counseled regarding possible photosensitivity and increased risk for sunburn.  Patient instructed to avoid sunlight, if possible.  When exposed to sunlight, patients should wear protective clothing, sunglasses, and sunscreen.  The patient was instructed to call the office immediately if the following severe adverse effects occur:  hearing changes, easy bruising/bleeding, severe headache, or vision changes.  The patient verbalized understanding of the proper use and possible adverse effects of doxycycline.  All of the patient's questions and concerns were addressed. Cyclosporine Pregnancy And Lactation Text: This medication is Pregnancy Category C and it isn't know if it is safe during pregnancy. This medication is excreted in breast milk. Topical Clindamycin Pregnancy And Lactation Text: This medication is Pregnancy Category B and is considered safe during pregnancy. It is unknown if it is excreted in breast milk. Griseofulvin Counseling:  I discussed with the patient the risks of griseofulvin including but not limited to photosensitivity, cytopenia, liver damage, nausea/vomiting and severe allergy.  The patient understands that this medication is best absorbed when taken with a fatty meal (e.g., ice cream or french fries). Simponi Counseling:  I discussed with the patient the risks of golimumab including but not limited to myelosuppression, immunosuppression, autoimmune hepatitis, demyelinating diseases, lymphoma, and serious infections.  The patient understands that monitoring is required including a PPD at baseline and must alert us or the primary physician if symptoms of infection or other concerning signs are noted. Tranexamic Acid Pregnancy And Lactation Text: It is unknown if this medication is safe during pregnancy or breast feeding. Acitretin Counseling:  I discussed with the patient the risks of acitretin including but not limited to hair loss, dry lips/skin/eyes, liver damage, hyperlipidemia, depression/suicidal ideation, photosensitivity.  Serious rare side effects can include but are not limited to pancreatitis, pseudotumor cerebri, bony changes, clot formation/stroke/heart attack.  Patient understands that alcohol is contraindicated since it can result in liver toxicity and significantly prolong the elimination of the drug by many years. Griseofulvin Pregnancy And Lactation Text: This medication is Pregnancy Category X and is known to cause serious birth defects. It is unknown if this medication is excreted in breast milk but breast feeding should be avoided. Spironolactone Pregnancy And Lactation Text: This medication can cause feminization of the male fetus and should be avoided during pregnancy. The active metabolite is also found in breast milk. Methotrexate Counseling:  Patient counseled regarding adverse effects of methotrexate including but not limited to nausea, vomiting, abnormalities in liver function tests. Patients may develop mouth sores, rash, diarrhea, and abnormalities in blood counts. The patient understands that monitoring is required including LFT's and blood counts.  There is a rare possibility of scarring of the liver and lung problems that can occur when taking methotrexate. Persistent nausea, loss of appetite, pale stools, dark urine, cough, and shortness of breath should be reported immediately. Patient advised to discontinue methotrexate treatment at least three months before attempting to become pregnant.  I discussed the need for folate supplements while taking methotrexate.  These supplements can decrease side effects during methotrexate treatment. The patient verbalized understanding of the proper use and possible adverse effects of methotrexate.  All of the patient's questions and concerns were addressed. Adbry Counseling: I discussed with the patient the risks of tralokinumab including but not limited to eye infection and irritation, cold sores, injection site reactions, worsening of asthma, allergic reactions and increased risk of parasitic infection.  Live vaccines should be avoided while taking tralokinumab. The patient understands that monitoring is required and they must alert us or the primary physician if symptoms of infection or other concerning signs are noted. Acitretin Pregnancy And Lactation Text: This medication is Pregnancy Category X and should not be given to women who are pregnant or may become pregnant in the future. This medication is excreted in breast milk. Niacinamide Pregnancy And Lactation Text: These medications are considered safe during pregnancy. Aklief Pregnancy And Lactation Text: It is unknown if this medication is safe to use during pregnancy.  It is unknown if this medication is excreted in breast milk.  Breastfeeding women should use the topical cream on the smallest area of the skin for the shortest time needed while breastfeeding.  Do not apply to nipple and areola. Rifampin Counseling: I discussed with the patient the risks of rifampin including but not limited to liver damage, kidney damage, red-orange body fluids, nausea/vomiting and severe allergy. Xolair Counseling:  Patient informed of potential adverse effects including but not limited to fever, muscle aches, rash and allergic reactions.  The patient verbalized understanding of the proper use and possible adverse effects of Xolair.  All of the patient's questions and concerns were addressed. Opzelura Pregnancy And Lactation Text: There is insufficient data to evaluate drug-associated risk for major birth defects, miscarriage, or other adverse maternal or fetal outcomes.  There is a pregnancy registry that monitors pregnancy outcomes in pregnant persons exposed to the medication during pregnancy.  It is unknown if this medication is excreted in breast milk.  Do not breastfeed during treatment and for about 4 weeks after the last dose. Winlevi Pregnancy And Lactation Text: This medication is considered safe during pregnancy and breastfeeding. Dutasteride Counseling: Dustasteride Counseling:  I discussed with the patient the risks of use of dutasteride including but not limited to decreased libido, decreased ejaculate volume, and gynecomastia. Women who can become pregnant should not handle medication.  All of the patient's questions and concerns were addressed. Solaraze Counseling:  I discussed with the patient the risks of Solaraze including but not limited to erythema, scaling, itching, weeping, crusting, and pain. Dutasteride Pregnancy And Lactation Text: This medication is absolutely contraindicated in women, especially during pregnancy and breast feeding. Feminization of male fetuses is possible if taking while pregnant. Solaraze Pregnancy And Lactation Text: This medication is Pregnancy Category B and is considered safe. There is some data to suggest avoiding during the third trimester. It is unknown if this medication is excreted in breast milk. VTAMA Counseling: I discussed with the patient that VTAMA is not for use in the eyes, mouth or mouth. They should call the office if they develop any signs of allergic reactions to VTAMA. The patient verbalized understanding of the proper use and possible adverse effects of VTAMA.  All of the patient's questions and concerns were addressed. Arava Counseling:  Patient counseled regarding adverse effects of Arava including but not limited to nausea, vomiting, abnormalities in liver function tests. Patients may develop mouth sores, rash, diarrhea, and abnormalities in blood counts. The patient understands that monitoring is required including LFTs and blood counts.  There is a rare possibility of scarring of the liver and lung problems that can occur when taking methotrexate. Persistent nausea, loss of appetite, pale stools, dark urine, cough, and shortness of breath should be reported immediately. Patient advised to discontinue Arava treatment and consult with a physician prior to attempting conception. The patient will have to undergo a treatment to eliminate Arava from the body prior to conception. Azithromycin Counseling:  I discussed with the patient the risks of azithromycin including but not limited to GI upset, allergic reaction, drug rash, diarrhea, and yeast infections. Valtrex Counseling: I discussed with the patient the risks of valacyclovir including but not limited to kidney damage, nausea, vomiting and severe allergy.  The patient understands that if the infection seems to be worsening or is not improving, they are to call. Rinvoq Pregnancy And Lactation Text: Based on animal studies, Rinvoq may cause embryo-fetal harm when administered to pregnant women.  The medication should not be used in pregnancy.  Breastfeeding is not recommended during treatment and for 6 days after the last dose. Ilumya Counseling: I discussed with the patient the risks of tildrakizumab including but not limited to immunosuppression, malignancy, posterior leukoencephalopathy syndrome, and serious infections.  The patient understands that monitoring is required including a PPD at baseline and must alert us or the primary physician if symptoms of infection or other concerning signs are noted. Doxycycline Pregnancy And Lactation Text: This medication is Pregnancy Category D and not consider safe during pregnancy. It is also excreted in breast milk but is considered safe for shorter treatment courses. Doxepin Counseling:  Patient advised that the medication is sedating and not to drive a car after taking this medication. Patient informed of potential adverse effects including but not limited to dry mouth, urinary retention, and blurry vision.  The patient verbalized understanding of the proper use and possible adverse effects of doxepin.  All of the patient's questions and concerns were addressed. Topical Ketoconazole Counseling: Patient counseled that this medication may cause skin irritation or allergic reactions.  In the event of skin irritation, the patient was advised to reduce the amount of the drug applied or use it less frequently.   The patient verbalized understanding of the proper use and possible adverse effects of ketoconazole.  All of the patient's questions and concerns were addressed. Imiquimod Counseling:  I discussed with the patient the risks of imiquimod including but not limited to erythema, scaling, itching, weeping, crusting, and pain.  Patient understands that the inflammatory response to imiquimod is variable from person to person and was educated regarded proper titration schedule.  If flu-like symptoms develop, patient knows to discontinue the medication and contact us. Skyrizi Counseling: I discussed with the patient the risks of risankizumab-rzaa including but not limited to immunosuppression, and serious infections.  The patient understands that monitoring is required including a PPD at baseline and must alert us or the primary physician if symptoms of infection or other concerning signs are noted. Methotrexate Pregnancy And Lactation Text: This medication is Pregnancy Category X and is known to cause fetal harm. This medication is excreted in breast milk. Valtrex Pregnancy And Lactation Text: this medication is Pregnancy Category B and is considered safe during pregnancy. This medication is not directly found in breast milk but it's metabolite acyclovir is present. Erythromycin Counseling:  I discussed with the patient the risks of erythromycin including but not limited to GI upset, allergic reaction, drug rash, diarrhea, increase in liver enzymes, and yeast infections. Itraconazole Counseling:  I discussed with the patient the risks of itraconazole including but not limited to liver damage, nausea/vomiting, neuropathy, and severe allergy.  The patient understands that this medication is best absorbed when taken with acidic beverages such as non-diet cola or ginger ale.  The patient understands that monitoring is required including baseline LFTs and repeat LFTs at intervals.  The patient understands that they are to contact us or the primary physician if concerning signs are noted. Bexarotene Counseling:  I discussed with the patient the risks of bexarotene including but not limited to hair loss, dry lips/skin/eyes, liver abnormalities, hyperlipidemia, pancreatitis, depression/suicidal ideation, photosensitivity, drug rash/allergic reactions, hypothyroidism, anemia, leukopenia, infection, cataracts, and teratogenicity.  Patient understands that they will need regular blood tests to check lipid profile, liver function tests, white blood cell count, thyroid function tests and pregnancy test if applicable. Adbry Pregnancy And Lactation Text: It is unknown if this medication will adversely affect pregnancy or breast feeding. Picato Counseling:  I discussed with the patient the risks of Picato including but not limited to erythema, scaling, itching, weeping, crusting, and pain. Glycopyrrolate Counseling:  I discussed with the patient the risks of glycopyrrolate including but not limited to skin rash, drowsiness, dry mouth, difficulty urinating, and blurred vision. Nsaids Counseling: NSAID Counseling: I discussed with the patient that NSAIDs should be taken with food. Prolonged use of NSAIDs can result in the development of stomach ulcers.  Patient advised to stop taking NSAIDs if abdominal pain occurs.  The patient verbalized understanding of the proper use and possible adverse effects of NSAIDs.  All of the patient's questions and concerns were addressed. Xolair Pregnancy And Lactation Text: This medication is Pregnancy Category B and is considered safe during pregnancy. This medication is excreted in breast milk. Rifampin Pregnancy And Lactation Text: This medication is Pregnancy Category C and it isn't know if it is safe during pregnancy. It is also excreted in breast milk and should not be used if you are breast feeding. Azelaic Acid Counseling: Patient counseled that medicine may cause skin irritation and to avoid applying near the eyes.  In the event of skin irritation, the patient was advised to reduce the amount of the drug applied or use it less frequently.   The patient verbalized understanding of the proper use and possible adverse effects of azelaic acid.  All of the patient's questions and concerns were addressed. Cosentyx Counseling:  I discussed with the patient the risks of Cosentyx including but not limited to worsening of Crohn's disease, immunosuppression, allergic reactions and infections.  The patient understands that monitoring is required including a PPD at baseline and must alert us or the primary physician if symptoms of infection or other concerning signs are noted. Nsaids Pregnancy And Lactation Text: These medications are considered safe up to 30 weeks gestation. It is excreted in breast milk. Azelaic Acid Pregnancy And Lactation Text: This medication is considered safe during pregnancy and breast feeding. Drysol Counseling:  I discussed with the patient the risks of drysol/aluminum chloride including but not limited to skin rash, itching, irritation, burning. Azathioprine Counseling:  I discussed with the patient the risks of azathioprine including but not limited to myelosuppression, immunosuppression, hepatotoxicity, lymphoma, and infections.  The patient understands that monitoring is required including baseline LFTs, Creatinine, possible TPMP genotyping and weekly CBCs for the first month and then every 2 weeks thereafter.  The patient verbalized understanding of the proper use and possible adverse effects of azathioprine.  All of the patient's questions and concerns were addressed. Finasteride Counseling:  I discussed with the patient the risks of use of finasteride including but not limited to decreased libido, decreased ejaculate volume, gynecomastia, and depression. Women should not handle medication.  All of the patient's questions and concerns were addressed. Soolantra Counseling: I discussed with the patients the risks of topial Soolantra. This is a medicine which decreases the number of mites and inflammation in the skin. You experience burning, stinging, eye irritation or allergic reactions.  Please call our office if you develop any problems from using this medication. Azithromycin Pregnancy And Lactation Text: This medication is considered safe during pregnancy and is also secreted in breast milk. Doxepin Pregnancy And Lactation Text: This medication is Pregnancy Category C and it isn't known if it is safe during pregnancy. It is also excreted in breast milk and breast feeding isn't recommended. Propranolol Counseling:  I discussed with the patient the risks of propranolol including but not limited to low heart rate, low blood pressure, low blood sugar, restlessness and increased cold sensitivity. They should call the office if they experience any of these side effects. Sotyktu Counseling:  I discussed the most common side effects of Sotyktu including: common cold, sore throat, sinus infections, cold sores, canker sores, folliculitis, and acne.? I also discussed more serious side effects of Sotyktu including but not limited to: serious allergic reactions; increased risk for infections such as TB; cancers such as lymphomas; rhabdomyolysis and elevated CPK; and elevated triglycerides and liver enzymes.? Infliximab Counseling:  I discussed with the patient the risks of infliximab including but not limited to myelosuppression, immunosuppression, autoimmune hepatitis, demyelinating diseases, lymphoma, and serious infections.  The patient understands that monitoring is required including a PPD at baseline and must alert us or the primary physician if symptoms of infection or other concerning signs are noted. Sotyktu Pregnancy And Lactation Text: There is insufficient data to evaluate whether or not Sotyktu is safe to use during pregnancy.? ?It is not known if Sotyktu passes into breast milk and whether or not it is safe to use when breastfeeding.?? Hydroxyzine Counseling: Patient advised that the medication is sedating and not to drive a car after taking this medication.  Patient informed of potential adverse effects including but not limited to dry mouth, urinary retention, and blurry vision.  The patient verbalized understanding of the proper use and possible adverse effects of hydroxyzine.  All of the patient's questions and concerns were addressed. Bactrim Counseling:  I discussed with the patient the risks of sulfa antibiotics including but not limited to GI upset, allergic reaction, drug rash, diarrhea, dizziness, photosensitivity, and yeast infections.  Rarely, more serious reactions can occur including but not limited to aplastic anemia, agranulocytosis, methemoglobinemia, blood dyscrasias, liver or kidney failure, lung infiltrates or desquamative/blistering drug rashes. Erivedge Counseling- I discussed with the patient the risks of Erivedge including but not limited to nausea, vomiting, diarrhea, constipation, weight loss, changes in the sense of taste, decreased appetite, muscle spasms, and hair loss.  The patient verbalized understanding of the proper use and possible adverse effects of Erivedge.  All of the patient's questions and concerns were addressed. Propranolol Pregnancy And Lactation Text: This medication is Pregnancy Category C and it isn't known if it is safe during pregnancy. It is excreted in breast milk. Topical Metronidazole Counseling: Metronidazole is a topical antibiotic medication. You may experience burning, stinging, redness, or allergic reactions.  Please call our office if you develop any problems from using this medication. Clofazimine Counseling:  I discussed with the patient the risks of clofazimine including but not limited to skin and eye pigmentation, liver damage, nausea/vomiting, gastrointestinal bleeding and allergy. Prednisone Counseling:  I discussed with the patient the risks of prolonged use of prednisone including but not limited to weight gain, insomnia, osteoporosis, mood changes, diabetes, susceptibility to infection, glaucoma and high blood pressure.  In cases where prednisone use is prolonged, patients should be monitored with blood pressure checks, serum glucose levels and an eye exam.  Additionally, the patient may need to be placed on GI prophylaxis, PCP prophylaxis, and calcium and vitamin D supplementation and/or a bisphosphonate.  The patient verbalized understanding of the proper use and the possible adverse effects of prednisone.  All of the patient's questions and concerns were addressed. Klisyri Counseling:  I discussed with the patient the risks of Klisyri including but not limited to erythema, scaling, itching, weeping, crusting, and pain. Glycopyrrolate Pregnancy And Lactation Text: This medication is Pregnancy Category B and is considered safe during pregnancy. It is unknown if it is excreted breast milk. Erythromycin Pregnancy And Lactation Text: This medication is Pregnancy Category B and is considered safe during pregnancy. It is also excreted in breast milk. Sarecycline Counseling: Patient advised regarding possible photosensitivity and discoloration of the teeth, skin, lips, tongue and gums.  Patient instructed to avoid sunlight, if possible.  When exposed to sunlight, patients should wear protective clothing, sunglasses, and sunscreen.  The patient was instructed to call the office immediately if the following severe adverse effects occur:  hearing changes, easy bruising/bleeding, severe headache, or vision changes.  The patient verbalized understanding of the proper use and possible adverse effects of sarecycline.  All of the patient's questions and concerns were addressed. Topical Steroids Counseling: I discussed with the patient that prolonged use of topical steroids can result in the increased appearance of superficial blood vessels (telangiectasias), lightening (hypopigmentation) and thinning of the skin (atrophy).  Patient understands to avoid using high potency steroids in skin folds, the groin or the face.  The patient verbalized understanding of the proper use and possible adverse effects of topical steroids.  All of the patient's questions and concerns were addressed. Cimzia Counseling:  I discussed with the patient the risks of Cimzia including but not limited to immunosuppression, allergic reactions and infections.  The patient understands that monitoring is required including a PPD at baseline and must alert us or the primary physician if symptoms of infection or other concerning signs are noted. Bexarotene Pregnancy And Lactation Text: This medication is Pregnancy Category X and should not be given to women who are pregnant or may become pregnant. This medication should not be used if you are breast feeding. Ketoconazole Counseling:   Patient counseled regarding improving absorption with orange juice.  Adverse effects include but are not limited to breast enlargement, headache, diarrhea, nausea, upset stomach, liver function test abnormalities, taste disturbance, and stomach pain.  There is a rare possibility of liver failure that can occur when taking ketoconazole. The patient understands that monitoring of LFTs may be required, especially at baseline. The patient verbalized understanding of the proper use and possible adverse effects of ketoconazole.  All of the patient's questions and concerns were addressed. Protopic Counseling: Patient may experience a mild burning sensation during topical application. Protopic is not approved in children less than 2 years of age. There have been case reports of hematologic and skin malignancies in patients using topical calcineurin inhibitors although causality is questionable. Olanzapine Counseling- I discussed with the patient the common side effects of olanzapine including but are not limited to: lack of energy, dry mouth, increased appetite, sleepiness, tremor, constipation, dizziness, changes in behavior, or restlessness.  Explained that teenagers are more likely to experience headaches, abdominal pain, pain in the arms or legs, tiredness, and sleepiness.  Serious side effects include but are not limited: increased risk of death in elderly patients who are confused, have memory loss, or dementia-related psychosis; hyperglycemia; increased cholesterol and triglycerides; and weight gain. Topical Steroids Applications Pregnancy And Lactation Text: Most topical steroids are considered safe to use during pregnancy and lactation.  Any topical steroid applied to the breast or nipple should be washed off before breastfeeding. Benzoyl Peroxide Counseling: Patient counseled that medicine may cause skin irritation and bleach clothing.  In the event of skin irritation, the patient was advised to reduce the amount of the drug applied or use it less frequently.   The patient verbalized understanding of the proper use and possible adverse effects of benzoyl peroxide.  All of the patient's questions and concerns were addressed. Zoryve Counseling:  I discussed with the patient that Zoryve is not for use in the eyes, mouth or vagina. The most commonly reported side effects include diarrhea, headache, insomnia, application site pain, upper respiratory tract infections, and urinary tract infections.  All of the patient's questions and concerns were addressed. Finasteride Pregnancy And Lactation Text: This medication is absolutely contraindicated during pregnancy. It is unknown if it is excreted in breast milk. Soolantra Pregnancy And Lactation Text: This medication is Pregnancy Category C. This medication is considered safe during breast feeding. Birth Control Pills Counseling: Birth Control Pill Counseling: I discussed with the patient the potential side effects of OCPs including but not limited to increased risk of stroke, heart attack, thrombophlebitis, deep venous thrombosis, hepatic adenomas, breast changes, GI upset, headaches, and depression.  The patient verbalized understanding of the proper use and possible adverse effects of OCPs. All of the patient's questions and concerns were addressed. SSKI Counseling:  I discussed with the patient the risks of SSKI including but not limited to thyroid abnormalities, metallic taste, GI upset, fever, headache, acne, arthralgias, paraesthesias, lymphadenopathy, easy bleeding, arrhythmias, and allergic reaction. Bactrim Pregnancy And Lactation Text: This medication is Pregnancy Category D and is known to cause fetal risk.  It is also excreted in breast milk. Cellcept Counseling:  I discussed with the patient the risks of mycophenolate mofetil including but not limited to infection/immunosuppression, GI upset, hypokalemia, hypercholesterolemia, bone marrow suppression, lymphoproliferative disorders, malignancy, GI ulceration/bleed/perforation, colitis, interstitial lung disease, kidney failure, progressive multifocal leukoencephalopathy, and birth defects.  The patient understands that monitoring is required including a baseline creatinine and regular CBC testing. In addition, patient must alert us immediately if symptoms of infection or other concerning signs are noted. Elidel Counseling: Patient may experience a mild burning sensation during topical application. Elidel is not approved in children less than 2 years of age. There have been case reports of hematologic and skin malignancies in patients using topical calcineurin inhibitors although causality is questionable. Topical Retinoid counseling:  Patient advised to apply a pea-sized amount only at bedtime and wait 30 minutes after washing their face before applying.  If too drying, patient may add a non-comedogenic moisturizer. The patient verbalized understanding of the proper use and possible adverse effects of retinoids.  All of the patient's questions and concerns were addressed. Hydroxyzine Pregnancy And Lactation Text: This medication is not safe during pregnancy and should not be taken. It is also excreted in breast milk and breast feeding isn't recommended. Topical Metronidazole Pregnancy And Lactation Text: This medication is Pregnancy Category B and considered safe during pregnancy.  It is also considered safe to use while breastfeeding. Metronidazole Counseling:  I discussed with the patient the risks of metronidazole including but not limited to seizures, nausea/vomiting, a metallic taste in the mouth, nausea/vomiting and severe allergy. Stelara Counseling:  I discussed with the patient the risks of ustekinumab including but not limited to immunosuppression, malignancy, posterior leukoencephalopathy syndrome, and serious infections.  The patient understands that monitoring is required including a PPD at baseline and must alert us or the primary physician if symptoms of infection or other concerning signs are noted. Cimzia Pregnancy And Lactation Text: This medication crosses the placenta but can be considered safe in certain situations. Cimzia may be excreted in breast milk. Hydroxychloroquine Counseling:  I discussed with the patient that a baseline ophthalmologic exam is needed at the start of therapy and every year thereafter while on therapy. A CBC may also be warranted for monitoring.  The side effects of this medication were discussed with the patient, including but not limited to agranulocytosis, aplastic anemia, seizures, rashes, retinopathy, and liver toxicity. Patient instructed to call the office should any adverse effect occur.  The patient verbalized understanding of the proper use and possible adverse effects of Plaquenil.  All the patient's questions and concerns were addressed. Klisyri Pregnancy And Lactation Text: It is unknown if this medication can harm a developing fetus or if it is excreted in breast milk. Isotretinoin Counseling: Patient should get monthly blood tests, not donate blood, not drive at night if vision affected, not share medication, and not undergo elective surgery for 6 months after tx completed. Side effects reviewed, pt to contact office should one occur.

## 2023-05-19 ENCOUNTER — OFFICE VISIT (OUTPATIENT)
Dept: PRIMARY CARE | Facility: CLINIC | Age: 58
End: 2023-05-19
Payer: COMMERCIAL

## 2023-05-19 VITALS
BODY MASS INDEX: 27.49 KG/M2 | DIASTOLIC BLOOD PRESSURE: 78 MMHG | SYSTOLIC BLOOD PRESSURE: 137 MMHG | TEMPERATURE: 97.6 F | WEIGHT: 165 LBS | HEIGHT: 65 IN | HEART RATE: 80 BPM

## 2023-05-19 DIAGNOSIS — Z87.891 FORMER SMOKER: ICD-10-CM

## 2023-05-19 DIAGNOSIS — Z12.31 ENCOUNTER FOR SCREENING MAMMOGRAM FOR MALIGNANT NEOPLASM OF BREAST: ICD-10-CM

## 2023-05-19 DIAGNOSIS — Z93.3 COLOSTOMY IN PLACE (MULTI): Primary | ICD-10-CM

## 2023-05-19 DIAGNOSIS — K43.2 INCISIONAL HERNIA WITHOUT OBSTRUCTION OR GANGRENE: ICD-10-CM

## 2023-05-19 PROCEDURE — 99213 OFFICE O/P EST LOW 20 MIN: CPT | Performed by: INTERNAL MEDICINE

## 2023-05-19 PROCEDURE — 1036F TOBACCO NON-USER: CPT | Performed by: INTERNAL MEDICINE

## 2023-05-19 RX ORDER — METOCLOPRAMIDE 5 MG/1
TABLET ORAL
COMMUNITY
Start: 2022-09-19

## 2023-05-19 ASSESSMENT — ENCOUNTER SYMPTOMS
CHILLS: 0
NEUROLOGICAL NEGATIVE: 1
GASTROINTESTINAL NEGATIVE: 1
EYES NEGATIVE: 1
CONSTITUTIONAL NEGATIVE: 1
RESPIRATORY NEGATIVE: 1
HEADACHES: 0
COUGH: 0
SINUS PRESSURE: 0
CARDIOVASCULAR NEGATIVE: 1
MUSCULOSKELETAL NEGATIVE: 1

## 2023-05-19 NOTE — PROGRESS NOTES
"Subjective   Patient ID: Lorena Mcgrath is a 57 y.o. female who presents for Sinusitis (Left ear pain and has a cold for past 3 days yellow drainage. Having some issues going for other test.).    Sinusitis  This is a new problem. The current episode started in the past 7 days. The problem has been gradually improving since onset. There has been no fever. She is experiencing no pain. Associated symptoms include congestion. Pertinent negatives include no chills, coughing, headaches or sinus pressure. Past treatments include acetaminophen. The treatment provided mild relief.    Hernia  Lorena Mcgrath is a 57 y.o. female who was referred for evaluation of a ventral hernia. Patient has symptoms of  none , which are made worse with  none . Symptoms were first noted 10 month(s) ago. Symptoms or injury did not occur at work. Pain is  none . Lump  gone now  reducible. The patient has no symptoms of  hernia . Patient  hernia repair  previous history of groin surgery.         Review of Systems   Constitutional: Negative.  Negative for chills.   HENT:  Positive for congestion. Negative for sinus pressure.    Eyes: Negative.    Respiratory: Negative.  Negative for cough.    Cardiovascular: Negative.    Gastrointestinal: Negative.    Musculoskeletal: Negative.    Skin: Negative.    Neurological: Negative.  Negative for headaches.       Objective   /78 (BP Location: Left arm, Patient Position: Sitting, BP Cuff Size: Adult)   Pulse 80   Temp 36.4 °C (97.6 °F) (Skin)   Ht 1.651 m (5' 5\")   Wt 74.8 kg (165 lb)   BMI 27.46 kg/m²     Physical Exam  Vitals reviewed.   Constitutional:       Appearance: Normal appearance. She is normal weight.   HENT:      Head: Normocephalic and atraumatic.      Left Ear: A middle ear effusion is present.   Eyes:      Conjunctiva/sclera: Conjunctivae normal.   Cardiovascular:      Rate and Rhythm: Normal rate and regular rhythm.      Pulses: Normal pulses.   Pulmonary:      Effort: Pulmonary " effort is normal.      Breath sounds: Normal breath sounds.   Abdominal:      Palpations: Abdomen is soft.      Comments: Wound healed midline abdomen, colostomy in place   Musculoskeletal:         General: Normal range of motion.      Cervical back: Normal range of motion.   Skin:     General: Skin is warm and dry.   Neurological:      General: No focal deficit present.   Psychiatric:         Mood and Affect: Mood normal.         Assessment/Plan   Problem List Items Addressed This Visit          Other    Colostomy in place (CMS/HCC) - Primary    Incisional hernia without obstruction or gangrene     Other Visit Diagnoses       Former smoker            I discussed smoking history/status that determined patient meets criteria for lung cancer screening with low dose CT scan.By using shared decision making we determined that patient will benefit from screening, including discussion of benefits and harms of screening, follow up testing, overdiagnosis, false positive rate and total radiation exposure.I counseled the patient on the importance of adhering to annual lung cancer low dose screening, the impact of comorbidities and individual ability or willingness to undergo diagnosis and treatment if abnormalities found. I provided patient an order for low dose CT Lung screening. Pt was encouraged to stay adherent with no smoking status and refrain from any possibility of staring it again.  She has a ventral hernia repair done, CT was reviewed, now the closure of colostomy is delayed, it could have been done by end of this year.  Now she is a former smoker but she has been heavy smoker in the past so she will qualify for low-dose CT of lung and also she needs a mammogram, her colostomy is working well, the stent from ureter has been removed, how she is feeling much better now, stress of the life persist, she is trying her best to keep her lifestyle and the smoking cessation at far away from her, call us for any concerns or  problems, follow-up in 6 months.

## 2023-10-15 ENCOUNTER — HOSPITAL ENCOUNTER (EMERGENCY)
Age: 58
Discharge: HOME | End: 2023-10-15
Payer: COMMERCIAL

## 2023-10-15 VITALS
BODY MASS INDEX: 30.82 KG/M2 | SYSTOLIC BLOOD PRESSURE: 162 MMHG | RESPIRATION RATE: 18 BRPM | OXYGEN SATURATION: 97 % | TEMPERATURE: 97.8 F | DIASTOLIC BLOOD PRESSURE: 80 MMHG | HEART RATE: 82 BPM | HEIGHT: 65 IN | WEIGHT: 185 LBS

## 2023-10-15 DIAGNOSIS — K08.89 PAIN, DENTAL: Primary | ICD-10-CM

## 2023-10-15 PROCEDURE — 99283 EMERGENCY DEPT VISIT LOW MDM: CPT

## 2023-10-15 RX ORDER — AMOXICILLIN 500 MG/1
500 CAPSULE ORAL 2 TIMES DAILY
Qty: 14 CAPSULE | Refills: 0 | Status: SHIPPED | OUTPATIENT
Start: 2023-10-15 | End: 2023-10-22

## 2023-10-15 RX ORDER — CHLORHEXIDINE GLUCONATE ORAL RINSE 1.2 MG/ML
15 SOLUTION DENTAL 2 TIMES DAILY
Qty: 420 ML | Refills: 0 | Status: SHIPPED | OUTPATIENT
Start: 2023-10-15 | End: 2023-10-29

## 2023-10-15 RX ORDER — HYDROCODONE BITARTRATE AND ACETAMINOPHEN 5; 325 MG/1; MG/1
1 TABLET ORAL EVERY 8 HOURS PRN
Qty: 6 TABLET | Refills: 0 | Status: SHIPPED | OUTPATIENT
Start: 2023-10-15 | End: 2023-10-17

## 2023-10-15 RX ORDER — KETOROLAC TROMETHAMINE 15 MG/ML
30 INJECTION, SOLUTION INTRAMUSCULAR; INTRAVENOUS ONCE
Status: DISCONTINUED | OUTPATIENT
Start: 2023-10-15 | End: 2023-10-15

## 2023-10-15 ASSESSMENT — PAIN - FUNCTIONAL ASSESSMENT: PAIN_FUNCTIONAL_ASSESSMENT: 0-10

## 2023-10-15 ASSESSMENT — ENCOUNTER SYMPTOMS
ABDOMINAL PAIN: 0
SHORTNESS OF BREATH: 0

## 2023-10-15 ASSESSMENT — PAIN SCALES - GENERAL: PAINLEVEL_OUTOF10: 8

## 2023-10-15 ASSESSMENT — PAIN DESCRIPTION - LOCATION: LOCATION: TEETH;EAR

## 2023-10-15 NOTE — ED TRIAGE NOTES
Pt states a tooth broke a week ago  Pt states her mouth and left ear hurt   Pt states her pain is a 8/10  Pt states she isn't sleeping   Pt is afebrile  Pt has a steady gait

## 2023-10-15 NOTE — ED PROVIDER NOTES
[facility] ED  eMERGENCY dEPARTMENT eNCOUnter      Pt Name: Nadiya Graham  MRN: [id number]  [address] [facility] 1965  Date of evaluation: 10/15/2023  Provider: Homero Pastor PA-C        HISTORY OF PRESENT ILLNESS    Nadiya Graham is a 60 y.o. female otherwise healthy presenting to the ED for acute dental pain that began 2 days ago. Pain is in the right upper part of mouth. No relief with motrin. Denies any cp, sob, dizziness, or any other physical complaints. REVIEW OF SYSTEMS       Review of Systems   Constitutional:  Negative for fever. HENT:  Positive for dental problem. Respiratory:  Negative for shortness of breath. Cardiovascular:  Negative for chest pain. Gastrointestinal:  Negative for abdominal pain. Genitourinary: Negative. All other systems reviewed and are negative. Except as noted above the remainder of the review of systems was reviewed and negative.        PAST MEDICAL HISTORY     Past Medical History:   Diagnosis Date    Diverticulitis     History of blood transfusion     Aug 25th, Jan 2000 s/p birth of son    PONV (postoperative nausea and vomiting)     Retention of urine          SURGICAL HISTORY       Past Surgical History:   Procedure Laterality Date    BLADDER SURGERY Left 10/10/2022    CYSTOSCOPY WITH LEFT RETROGRADE PYELOGRAM AND POSSIBLE LEFT DOUBLE J STENT REMOVAL AND REPLACEMENT performed by Dacia Adame MD at [address] [facility] Left 11/21/2022    CYSTOSCOPY WITH LEFT DOUBLE J STENT REMOVAL AND LEFT RETROGRADE PYELOGRAM. performed by Dacia Adame MD at [address] N/A 08/25/2022    SIGMOIDECTOMY WITH ABDOMINAL HYSTERECTOMY WITH BSO, END COLOSTOMY, ENTERECTOMY, CYSTOSCOPY WITH URETERAL STENT PLACEMENT performed by Salvador Beauchamp MD at [address] [facility] [address] N/A 1/9/2023    COLONOSCOPY performed by Salvador Beauchamp MD at [address] [facility], COLON, DIAGNOSTIC      HYSTERECTOMY ([address] [facility])

## 2023-11-13 NOTE — PROGRESS NOTES
Assessment completed this shift. Alert and oriented x4. Denies pain at present. With movement reports 5/10 abdominal pain. Abdominal dressing clean, dry, and intact. Per patient, dressing was changed by Dr Christopher Velasquez yesterday afternoon. Rothman draining clear kirk to yellow. At time of assessment, patient was calm and cooperative. This nurse returned to do hourly rounding approximately 1100, and patient reported feeling\" shitty\". This nurse inquired about about what she meant by this. Patient teared up and said that she felt \"overwhelmed\" and \" tired\". Assisted patient to reposition for comfort. No other needs at present.   Electronically signed by Ya Darden RN on 8/29/2022 at 11:36 AM [facility]  WO Requested Medium Ostomy belt from [employer] (Arjun 57 HIGH OUTPUT ([id number]) ostomy supplies barrier: [place] 57mm SOFT CONVEX ([id number])  Accessories used: [facility] Silas Score: 16    Elizabeth Hernandez RN, CWON  Please contact via redealize at name or group \"[facility] nurse\"- M-F [phone]  Leave VM @ *[phone] for non-urgent needs. Checked occasionally M-F.         "

## 2023-11-20 ENCOUNTER — APPOINTMENT (OUTPATIENT)
Dept: PRIMARY CARE | Facility: CLINIC | Age: 58
End: 2023-11-20
Payer: COMMERCIAL

## 2024-03-08 NOTE — ED PROVIDER NOTES
HPI    Nursing Notes were reviewed. REVIEW OF SYSTEMS    (2-9 systems for level 4, 10 or more for level 5)     Review of Systems   Constitutional:  Positive for appetite change. Negative for chills, fatigue and fever. HENT:  Negative for congestion. Eyes:  Negative for photophobia. Respiratory:  Negative for cough, shortness of breath and wheezing. Cardiovascular:  Negative for chest pain and palpitations. Gastrointestinal:  Positive for abdominal distention, abdominal pain, constipation and nausea. Negative for anal bleeding, blood in stool, diarrhea and vomiting. Genitourinary:  Positive for frequency. Negative for difficulty urinating, dysuria and hematuria. Musculoskeletal:  Negative for back pain, myalgias and neck pain. Skin:  Negative for rash. Allergic/Immunologic: Negative for immunocompromised state. Neurological:  Negative for dizziness, weakness and headaches. All other systems reviewed and are negative. Except as noted above the remainder of the review of systems was reviewed and negative. PAST MEDICAL HISTORY   History reviewed. No pertinent past medical history. SURGICAL HISTORY     History reviewed. No pertinent surgical history. CURRENT MEDICATIONS       Previous Medications    No medications on file       ALLERGIES     Codeine    FAMILY HISTORY     History reviewed. No pertinent family history.        SOCIAL HISTORY       Social History     Socioeconomic History    Marital status:      Spouse name: None    Number of children: None    Years of education: None    Highest education level: None   Tobacco Use    Smoking status: Every Day     Types: Cigarettes    Smokeless tobacco: Current   Vaping Use    Vaping Use: Never used   Substance and Sexual Activity    Alcohol use: Not Currently    Drug use: Never    Sexual activity: Not Currently       SCREENINGS         Alexis Coma Scale  Eye Opening: Spontaneous  Best Verbal Response: Oriented  Best Motor Response: Obeys commands  Alexis Coma Scale Score: 15                     CIWA Assessment  BP: 116/75  Heart Rate: 56                 PHYSICAL EXAM    (up to 7 for level 4, 8 or more for level 5)     ED Triage Vitals [08/17/22 1237]   BP Temp Temp src Heart Rate Resp SpO2 Height Weight   109/77 98.4 °F (36.9 °C) -- (!) 118 15 97 % 5' 5\" (1.651 m) 150 lb (68 kg)       Physical Exam  Constitutional:       General: She is not in acute distress. Appearance: She is well-developed. She is not ill-appearing, toxic-appearing or diaphoretic. HENT:      Head: Normocephalic and atraumatic. Nose: Nose normal.      Mouth/Throat:      Mouth: Mucous membranes are moist.   Eyes:      Pupils: Pupils are equal, round, and reactive to light. Cardiovascular:      Rate and Rhythm: Regular rhythm. Tachycardia present. Heart sounds: No murmur heard. No friction rub. No gallop. Pulmonary:      Effort: Pulmonary effort is normal. No respiratory distress. Breath sounds: Normal breath sounds. No wheezing, rhonchi or rales. Abdominal:      General: Bowel sounds are normal. There is no distension. Palpations: Abdomen is soft. Tenderness: There is no abdominal tenderness. There is no rebound. Musculoskeletal:         General: No swelling. Cervical back: Normal range of motion. Skin:     General: Skin is warm and dry. Capillary Refill: Capillary refill takes less than 2 seconds. Neurological:      General: No focal deficit present. Mental Status: She is alert and oriented to person, place, and time. DIAGNOSTIC RESULTS     EKG: All EKG's are interpreted by the Emergency Department Physician who either signs or Co-signs this chart in the absence of a cardiologist.    EKG shows NSR with HR 94, normal axis, normal intervals, no ST changes.         RADIOLOGY:   Non-plain film images such as CT, Ultrasound and MRI are read by the radiologist. Plain radiographic images are visualized and preliminarily interpreted by the emergency physician with the below findings:        Interpretation per the Radiologist below, if available at the time of this note:    CT ABDOMEN PELVIS W IV CONTRAST Additional Contrast? None   Final Result      Findings suspicious for obstructing colonic neoplasm in the distal sigmoid colon. Further evaluation with colonoscopy is suggested.                      ==========         XR CHEST PORTABLE   Final Result   NO RADIOGRAPHIC FINDINGS OF ACUTE CARDIOPULMONARY DISEASE ON PORTABLE PLAIN FILM            ED BEDSIDE ULTRASOUND:   Performed by ED Physician - none    LABS:  Labs Reviewed   CBC WITH AUTO DIFFERENTIAL - Abnormal; Notable for the following components:       Result Value    RDW 15.2 (*)     Neutrophils Absolute 7.4 (*)     All other components within normal limits   POCT CREATININE - URINE - Normal   COMPREHENSIVE METABOLIC PANEL   LACTIC ACID   LIPASE   MAGNESIUM   TROPONIN   URINALYSIS WITH REFLEX TO CULTURE       All other labs were within normal range or not returned as of this dictation. EMERGENCY DEPARTMENT COURSE and DIFFERENTIAL DIAGNOSIS/MDM:   Vitals:    Vitals:    08/17/22 1330 08/17/22 1345 08/17/22 1400 08/17/22 1500   BP:  109/80 117/80 116/75   Pulse: 92 81 68 56   Resp: 17 24 16 15   Temp:       SpO2: 96% 96% 98% 98%   Weight:       Height:               MDM    Patient is a 79-year-old female who presents to the ED for evaluation of diffuse abdominal pain nausea. She is afebrile and tachycardic to 118 on arrival. She was given 1 L IV NS, IV morphine, IV zofran in the ED. EKG shows NSR with HR 94, normal axis, normal intervals, no ST changes. Labs are unremarkable. CT of the abdomen pelvis shows extensive feces throughout the colon.   Focal area of masslike wall thickening \"apple core lesion\" within the distal sigmoid colon within the pelvis suspicious for obstructing neoplasm with associated distention of upstream sigmoid descending transverse and ascending colon. Small bowel is of normal caliber. Diverticulosis without evidence of diverticulitis. Discussed results with patient. She declines admission at this time, states she \"needs to get her affairs in order, talk with her medical doctor and talk with her family. \" ED attending Dr. Nadine Aguilar spoke with Dr. Rosa Isela Carter who will see the patient first thing Friday. Referral placed. Pt will be sent home with percocet and zofran. She is stable for discharge. Return to the ED immediately for worsening symptoms given warning signs for which she should return. Patient understands agrees to plan. REASSESSMENT          CRITICAL CARE TIME   Total Critical Care time was 0 minutes, excluding separately reportable procedures. There was a high probability of clinically significant/life threatening deterioration in the patient's condition which required my urgent intervention. CONSULTS:  None    PROCEDURES:  Unless otherwise noted below, none     Procedures        FINAL IMPRESSION      1. Generalized abdominal pain    2. Abnormal CT scan, pelvis          DISPOSITION/PLAN   DISPOSITION Discharge - Pending Orders Complete 08/17/2022 04:35:13 PM      PATIENT REFERRED TO:  Corina Ervin MD  [address]  [address]  [facility] [id number] [facility]    Go in 2 days      Corina Ervin MD  [address]  [address]  [facility] [id number] [facility] ED  [address] [facility]  [phone]  Go to   As needed, If symptoms worsen    DISCHARGE MEDICATIONS:  New Prescriptions    ONDANSETRON (ZOFRAN-ODT) 4 MG DISINTEGRATING TABLET    Place 1 tablet under the tongue 3 times daily as needed for Nausea or Vomiting    OXYCODONE-ACETAMINOPHEN (PERCOCET) 5-325 MG PER TABLET    Take 1 tablet by mouth every 6 hours as needed for Pain for up to 5 days. Intended supply: 3 days.  Take lowest dose possible to manage pain     Controlled Substances Monitoring:     No flowsheet data found.     (Please note that portions of this note were completed with a voice recognition program.  Efforts were made to edit the dictations but occasionally words are mis-transcribed.)    Cordelia Posadas PA-C (electronically signed)             Cordelia Posadas PA-C  08/17/22 6060 Initiate Treatment: Azeleic Acid QAM\\nWinlevi BID Detail Level: Zone Continue Regimen: Cerave acne foaming cleanser qam \\nCerave ultralight lotion with spf\\ntretinoin 0.05 % topical cream; Apply to face every other night Discontinue Regimen: Doxy Render In Strict Bullet Format?: No Modify Regimen: clindamycin lotion

## 2024-04-02 ENCOUNTER — HOSPITAL ENCOUNTER (EMERGENCY)
Age: 59
Discharge: HOME | End: 2024-04-02
Attending: STUDENT IN AN ORGANIZED HEALTH CARE EDUCATION/TRAINING PROGRAM
Payer: COMMERCIAL

## 2024-04-02 VITALS
RESPIRATION RATE: 18 BRPM | TEMPERATURE: 98.1 F | WEIGHT: 185 LBS | DIASTOLIC BLOOD PRESSURE: 81 MMHG | HEART RATE: 98 BPM | BODY MASS INDEX: 30.82 KG/M2 | OXYGEN SATURATION: 98 % | SYSTOLIC BLOOD PRESSURE: 137 MMHG | HEIGHT: 65 IN

## 2024-04-02 DIAGNOSIS — N39.0 URINARY TRACT INFECTION WITHOUT HEMATURIA, SITE UNSPECIFIED: Primary | ICD-10-CM

## 2024-04-02 LAB
BACTERIA URNS QL MICRO: ABNORMAL /HPF
BILIRUB UR QL STRIP: NEGATIVE
CLARITY UR: CLEAR
COLOR UR: YELLOW
EPI CELLS #/AREA URNS HPF: ABNORMAL /HPF
GLUCOSE UR STRIP-MCNC: NEGATIVE MG/DL
HGB UR QL STRIP: NEGATIVE
HYALINE CASTS #/AREA URNS LPF: ABNORMAL /LPF (ref 0–5)
KETONES UR STRIP-MCNC: NEGATIVE MG/DL
LEUKOCYTE ESTERASE UR QL STRIP: ABNORMAL
NITRITE UR QL STRIP: NEGATIVE
PH UR STRIP: 5 [PH] (ref 5–9)
PROT UR STRIP-MCNC: NEGATIVE MG/DL
RBC #/AREA URNS HPF: ABNORMAL /HPF (ref 0–2)
SP GR UR STRIP: 1.03 (ref 1–1.03)
URINE REFLEX TO CULTURE: ABNORMAL
UROBILINOGEN UR STRIP-ACNC: 0.2 E.U./DL
WBC #/AREA URNS HPF: ABNORMAL /HPF (ref 0–5)

## 2024-04-02 PROCEDURE — 6370000000 HC RX 637 (ALT 250 FOR IP): Performed by: STUDENT IN AN ORGANIZED HEALTH CARE EDUCATION/TRAINING PROGRAM

## 2024-04-02 PROCEDURE — 81001 URINALYSIS AUTO W/SCOPE: CPT

## 2024-04-02 PROCEDURE — 99283 EMERGENCY DEPT VISIT LOW MDM: CPT

## 2024-04-02 RX ORDER — CEPHALEXIN 500 MG/1
500 CAPSULE ORAL ONCE
Status: COMPLETED | OUTPATIENT
Start: 2024-04-02 | End: 2024-04-02

## 2024-04-02 RX ORDER — CEPHALEXIN 500 MG/1
500 CAPSULE ORAL 3 TIMES DAILY
Qty: 21 CAPSULE | Refills: 0 | Status: SHIPPED | OUTPATIENT
Start: 2024-04-02 | End: 2024-04-09

## 2024-04-02 RX ORDER — ONDANSETRON 4 MG/1
4 TABLET, ORALLY DISINTEGRATING ORAL ONCE
Status: COMPLETED | OUTPATIENT
Start: 2024-04-02 | End: 2024-04-02

## 2024-04-02 RX ORDER — ONDANSETRON 4 MG/1
4 TABLET, FILM COATED ORAL 3 TIMES DAILY PRN
Qty: 9 TABLET | Refills: 0 | Status: SHIPPED | OUTPATIENT
Start: 2024-04-02

## 2024-04-02 RX ADMIN — CEPHALEXIN 500 MG: 500 CAPSULE ORAL at 12:35

## 2024-04-02 RX ADMIN — ONDANSETRON 4 MG: 4 TABLET, ORALLY DISINTEGRATING ORAL at 12:35

## 2024-04-02 ASSESSMENT — PAIN - FUNCTIONAL ASSESSMENT: PAIN_FUNCTIONAL_ASSESSMENT: 0-10

## 2024-04-02 ASSESSMENT — PAIN DESCRIPTION - DESCRIPTORS: DESCRIPTORS: SHARP

## 2024-04-02 ASSESSMENT — LIFESTYLE VARIABLES
HOW MANY STANDARD DRINKS CONTAINING ALCOHOL DO YOU HAVE ON A TYPICAL DAY: PATIENT DOES NOT DRINK
HOW OFTEN DO YOU HAVE A DRINK CONTAINING ALCOHOL: NEVER

## 2024-04-02 ASSESSMENT — PAIN DESCRIPTION - PAIN TYPE: TYPE: ACUTE PAIN

## 2024-04-02 ASSESSMENT — PAIN SCALES - GENERAL: PAINLEVEL_OUTOF10: 7

## 2024-04-02 ASSESSMENT — PAIN DESCRIPTION - LOCATION: LOCATION: VAGINA

## 2024-04-02 ASSESSMENT — PAIN DESCRIPTION - FREQUENCY: FREQUENCY: CONTINUOUS

## 2024-04-02 ASSESSMENT — PAIN DESCRIPTION - ONSET: ONSET: ON-GOING

## 2024-04-02 NOTE — ED NOTES
Pt c/o dysuria, n/v/d, decreased appetite, fever and chills. Pt is A&OX4, skin intact, afebrile, ambulatory, breaths are equal and unlabored.

## 2024-04-02 NOTE — ED PROVIDER NOTES
unspecified          DISPOSITION / PLAN     DISPOSITION Decision To Discharge 04/02/2024 12:45:17 PM      PATIENT REFERREDTO:  Mark Salvador MD  [address]  [place] [address] [phone]    Call in 1 day  to follow up for uti      DISCHARGE MEDICATIONS:  Discharge Medication List as of 4/2/2024 12:54 PM        START taking these medications    Details   cephALEXin (KEFLEX) 500 MG capsule Take 1 capsule by mouth 3 times daily for 7 days, Disp-21 capsule, R-0Normal      ondansetron (ZOFRAN) 4 MG tablet Take 1 tablet by mouth 3 times daily as needed for Nausea or Vomiting, Disp-9 tablet, R-0Normal             Gamal Milian DO  Emergency Medicine Physician  04/02/24 1:07 PM        (Please note that portions of this note were completed with a voice recognition program.Efforts were made to edit the dictations but occasionally words are mis-transcribed.)        Gamal Milian DO  04/02/24 0839

## 2024-04-02 NOTE — ED NOTES
Pt ambulated to bathroom and given specimen cup for urine sample   Pt states this feels like a UTI and she feels nauseous

## 2024-08-27 ENCOUNTER — LAB REQUISITION (OUTPATIENT)
Dept: LAB | Facility: HOSPITAL | Age: 59
End: 2024-08-27
Payer: COMMERCIAL

## 2024-08-27 DIAGNOSIS — R30.0 DYSURIA: ICD-10-CM

## 2024-08-27 PROCEDURE — 87086 URINE CULTURE/COLONY COUNT: CPT

## 2024-08-29 LAB — BACTERIA UR CULT: NORMAL

## (undated) DEVICE — STAPLER INT 75MM CUT LN L73MM STPL LN L77MM LNAR B-FORM

## (undated) DEVICE — GUIDEWIRE URO L150CM DIA0.035IN TAPR 8CM STR TIP STD SHFT

## (undated) DEVICE — SKIN PREP TRAY 4 COMPARTM TRAY: Brand: MEDLINE INDUSTRIES, INC.

## (undated) DEVICE — GAUZE,SPONGE,4"X4",16PLY,XRAY,STRL,LF: Brand: MEDLINE

## (undated) DEVICE — TUBE SET 96 MM 64 MM H2O PERISTALTIC STD AUX CHANNEL

## (undated) DEVICE — BAG DRAINAGE URIN LIGEMAN W/ ADPT SUCTION HOSE CYSTO URO

## (undated) DEVICE — TTL1LYR 16FR10ML 100%SIL TMPST TR: Brand: MEDLINE

## (undated) DEVICE — SYRINGE MED 10ML LUERLOCK TIP W/O SFTY DISP

## (undated) DEVICE — YANKAUER,BULB TIP,W/O VENT,RIGID,STERILE: Brand: MEDLINE

## (undated) DEVICE — ADAPTER FLSH PMP FLD MGMT GI IRRIG OFP 2 DISPOSABLE

## (undated) DEVICE — PACK,LAPAROTOMY,NO GOWNS: Brand: MEDLINE

## (undated) DEVICE — SPONGE GZ W4XL4IN COT 12 PLY TYP VII WVN C FLD DSGN

## (undated) DEVICE — TOWEL,OR,DSP,ST,BLUE,STD,4/PK,20PK/CS: Brand: MEDLINE

## (undated) DEVICE — GOWN,AURORA,NONRNF,XL,30/CS: Brand: MEDLINE

## (undated) DEVICE — LABEL MED MINI W/ MARKER

## (undated) DEVICE — APPLICATOR MEDICATED 26 CC SOLUTION HI LT ORNG CHLORAPREP

## (undated) DEVICE — RELOAD STPL 40MM H1.5-4.7MM WIRE 0.2MM REG TISS GRN CRV

## (undated) DEVICE — SPONGE,LAP,18"X18",DLX,XR,ST,5/PK,40/PK: Brand: MEDLINE

## (undated) DEVICE — GAUZE,SPONGE,FLUFF,6"X6.75",STRL,10/TRAY: Brand: MEDLINE

## (undated) DEVICE — STAPLER INT CUT LN 40MM STPL 51MM GRN CRV HD B FRM

## (undated) DEVICE — GLOVE ORANGE PI 7 1/2   MSG9075

## (undated) DEVICE — SEALER ENDOSCP NANO COAT OPN DIV CRV L JAW LIGASURE IMPACT

## (undated) DEVICE — Device

## (undated) DEVICE — GOWN,SIRUS,POLYRNF,BRTHSLV,XLN/XL,20/CS: Brand: MEDLINE

## (undated) DEVICE — STAPLER SKIN H3.9MM WIRE DIA0.58MM CRWN 6.9MM 35 STPL ROT

## (undated) DEVICE — GOWN,AURORA,NONREINFORCED,LARGE: Brand: MEDLINE

## (undated) DEVICE — SET,IRRIGATION,CYSTO/TUR: Brand: MEDLINE

## (undated) DEVICE — SINGLE PORT MANIFOLD: Brand: NEPTUNE 2

## (undated) DEVICE — RELOAD STPL L75MM OPN H3.8MM CLS 1.5MM WIRE DIA0.2MM REG

## (undated) DEVICE — COUNTER NDL 40 COUNT HLD 70 FOAM BLK ADH W/ MAG

## (undated) DEVICE — SYRINGE IRRIG 60ML SFT PLIABLE BLB EZ TO GRP 1 HND USE W/

## (undated) DEVICE — PACK,BASIC: Brand: MEDLINE

## (undated) DEVICE — SUTURE ABSORBABLE BRAIDED 0 CT-1 8X27 IN UD VICRYL JJ41G

## (undated) DEVICE — SUTURE PERMA-HAND SZ 3-0 L18IN NONABSORBABLE BLK SH-1 L22MM C003D

## (undated) DEVICE — SUTURE PROL SZ 0 L30IN NONABSORBABLE BLU L36MM CT-1 1/2 CIR 8424H

## (undated) DEVICE — SEALER TISS L20CM DIA13MM ADV BPLR L CRV JAW OPN APPRCH

## (undated) DEVICE — SPONGE GZ W4XL4IN RAYON POLY FILL CVR W/ NONWOVEN FAB

## (undated) DEVICE — APPLIER LIG CLP M L11IN TI STR RNG HNDL FOR 20 CLP DISP

## (undated) DEVICE — CONTAINER,SPECIMEN,OR STERILE,4OZ: Brand: MEDLINE

## (undated) DEVICE — YANKAUER,POOLE TIP,STERILE,50/CS: Brand: MEDLINE

## (undated) DEVICE — NEPTUNE E-SEP SMOKE EVACUATION PENCIL, COATED, 70MM BLADE, PUSH BUTTON SWITCH: Brand: NEPTUNE E-SEP

## (undated) DEVICE — MARKER SURG SKIN GENTIAN VLT REG TIP W/ 6IN RUL

## (undated) DEVICE — E-Z CLEAN, NON-STICK, PTFE COATED, ELECTROSURGICAL BLADE ELECTRODE, 6.5 INCH (16.5 CM): Brand: MEGADYNE

## (undated) DEVICE — DBD-PACK,CYSTOSCOPY,PK VI,AURORA: Brand: MEDLINE

## (undated) DEVICE — ELECTRODE PT RET AD L9FT HI MOIST COND ADH HYDRGEL CORDED

## (undated) DEVICE — COVER LT HNDL BLU PLAS

## (undated) DEVICE — Device: Brand: ENDO SMARTCAP

## (undated) DEVICE — TUBING, SUCTION, 1/4" X 10', STRAIGHT: Brand: MEDLINE

## (undated) DEVICE — DRAPE,MEDI-SLUSH,STERILE: Brand: MEDLINE

## (undated) DEVICE — TUBING, SUCTION, 9/32" X 12', STRAIGHT: Brand: MEDLINE INDUSTRIES, INC.

## (undated) DEVICE — JELLY,LUBE,STERILE,FLIP TOP,TUBE,2-OZ: Brand: MEDLINE

## (undated) DEVICE — BRUSH ENDO COMBO

## (undated) DEVICE — SNARE ENDOSCP M L240CM W27MM SHTH DIA2.4MM CHN 2.8MM HEX

## (undated) DEVICE — DRAPE EQUIP TRNSPRT CONTAINMENT FOR BK TAB

## (undated) DEVICE — DRAPE, LAVH, STERILE: Brand: MEDLINE

## (undated) DEVICE — Z DISCONTINUED USE 2716238 PER MEDLINE STAPLER INT L40MM DIA4.7MM GRN CRV HD B FRM TECHNOLOGY DISP

## (undated) DEVICE — TOWEL,OR,DSP,ST,GREEN,DLX,XR,4/PK,20PK/C: Brand: MEDLINE

## (undated) DEVICE — GLOVE ORANGE PI 8   MSG9080

## (undated) DEVICE — RELOAD STPL H2X4.7XL30MM THCK TISS GRN B FRM AUTO RET PIN

## (undated) DEVICE — INTENDED FOR TISSUE SEPARATION, AND OTHER PROCEDURES THAT REQUIRE A SHARP SURGICAL BLADE TO PUNCTURE OR CUT.: Brand: BARD-PARKER ® CARBON RIB-BACK BLADES

## (undated) DEVICE — GOWN,SIRUS,POLYRNF,BRTHSLV,LG,30/CS: Brand: MEDLINE